# Patient Record
Sex: MALE | Race: WHITE | NOT HISPANIC OR LATINO | Employment: FULL TIME | URBAN - METROPOLITAN AREA
[De-identification: names, ages, dates, MRNs, and addresses within clinical notes are randomized per-mention and may not be internally consistent; named-entity substitution may affect disease eponyms.]

---

## 2017-02-22 ENCOUNTER — ALLSCRIPTS OFFICE VISIT (OUTPATIENT)
Dept: OTHER | Facility: OTHER | Age: 27
End: 2017-02-22

## 2017-04-18 ENCOUNTER — ALLSCRIPTS OFFICE VISIT (OUTPATIENT)
Dept: OTHER | Facility: OTHER | Age: 27
End: 2017-04-18

## 2017-04-21 ENCOUNTER — ALLSCRIPTS OFFICE VISIT (OUTPATIENT)
Dept: OTHER | Facility: OTHER | Age: 27
End: 2017-04-21

## 2017-04-21 ENCOUNTER — GENERIC CONVERSION - ENCOUNTER (OUTPATIENT)
Dept: OTHER | Facility: OTHER | Age: 27
End: 2017-04-21

## 2017-04-22 LAB
A/G RATIO (HISTORICAL): 1.8 (ref 1.2–2.2)
ALBUMIN SERPL BCP-MCNC: 4.7 G/DL (ref 3.5–5.5)
ALP SERPL-CCNC: 60 IU/L (ref 39–117)
ALT SERPL W P-5'-P-CCNC: 36 IU/L (ref 0–44)
AST SERPL W P-5'-P-CCNC: 28 IU/L (ref 0–40)
BASOPHILS # BLD AUTO: 0.1 X10E3/UL (ref 0–0.2)
BASOPHILS # BLD AUTO: 1 %
BILIRUB SERPL-MCNC: 0.5 MG/DL (ref 0–1.2)
BUN SERPL-MCNC: 14 MG/DL (ref 6–20)
BUN/CREA RATIO (HISTORICAL): 16 (ref 9–20)
CALCIUM SERPL-MCNC: 9.6 MG/DL (ref 8.7–10.2)
CHLORIDE SERPL-SCNC: 97 MMOL/L (ref 96–106)
CO2 SERPL-SCNC: 24 MMOL/L (ref 18–29)
CREAT SERPL-MCNC: 0.88 MG/DL (ref 0.76–1.27)
DEPRECATED RDW RBC AUTO: 14 % (ref 12.3–15.4)
EGFR AFRICAN AMERICAN (HISTORICAL): 138 ML/MIN/1.73
EGFR-AMERICAN CALC (HISTORICAL): 119 ML/MIN/1.73
EOSINOPHIL # BLD AUTO: 0.2 X10E3/UL (ref 0–0.4)
EOSINOPHIL # BLD AUTO: 3 %
ERYTHROCYTE SEDIMENTATION RATE (HISTORICAL): 5 MM/HR (ref 0–15)
GLUCOSE SERPL-MCNC: 78 MG/DL (ref 65–99)
HCT VFR BLD AUTO: 43 % (ref 37.5–51)
HGB BLD-MCNC: 14.4 G/DL (ref 12.6–17.7)
IMM.GRANULOCYTES (CD4/8) (HISTORICAL): 0 %
IMM.GRANULOCYTES (CD4/8) (HISTORICAL): 0 X10E3/UL (ref 0–0.1)
LYME 18 KD IGG (HISTORICAL): ABNORMAL
LYME 23 KD IGG (HISTORICAL): ABNORMAL
LYME 23 KD IGM (HISTORICAL): ABNORMAL
LYME 28 KD IGG (HISTORICAL): ABNORMAL
LYME 30 KD IGG (HISTORICAL): ABNORMAL
LYME 39 KD IGG (HISTORICAL): ABNORMAL
LYME 39 KD IGM (HISTORICAL): ABNORMAL
LYME 41 KD IGG (HISTORICAL): ABNORMAL
LYME 41 KD IGM (HISTORICAL): ABNORMAL
LYME 45 KD IGG (HISTORICAL): ABNORMAL
LYME 58 KD IGG (HISTORICAL): ABNORMAL
LYME 66 KD IGG (HISTORICAL): PRESENT
LYME 93 KD IGG (HISTORICAL): ABNORMAL
LYME IGG WB INTERP. (HISTORICAL): NEGATIVE
LYME IGM WB INTERP. (HISTORICAL): NEGATIVE
LYMPHOCYTES # BLD AUTO: 2.3 X10E3/UL (ref 0.7–3.1)
LYMPHOCYTES # BLD AUTO: 29 %
MCH RBC QN AUTO: 27 PG (ref 26.6–33)
MCHC RBC AUTO-ENTMCNC: 33.5 G/DL (ref 31.5–35.7)
MCV RBC AUTO: 81 FL (ref 79–97)
MONOCYTES # BLD AUTO: 0.9 X10E3/UL (ref 0.1–0.9)
MONOCYTES (HISTORICAL): 11 %
NEUTROPHILS # BLD AUTO: 4.5 X10E3/UL (ref 1.4–7)
NEUTROPHILS # BLD AUTO: 56 %
PLATELET # BLD AUTO: 235 X10E3/UL (ref 150–379)
POTASSIUM SERPL-SCNC: 3.8 MMOL/L (ref 3.5–5.2)
RBC (HISTORICAL): 5.34 X10E6/UL (ref 4.14–5.8)
SODIUM SERPL-SCNC: 141 MMOL/L (ref 134–144)
TOT. GLOBULIN, SERUM (HISTORICAL): 2.6 G/DL (ref 1.5–4.5)
TOTAL PROTEIN (HISTORICAL): 7.3 G/DL (ref 6–8.5)
TSH SERPL DL<=0.05 MIU/L-ACNC: 6.15 UIU/ML (ref 0.45–4.5)
WBC # BLD AUTO: 8 X10E3/UL (ref 3.4–10.8)

## 2017-04-23 ENCOUNTER — GENERIC CONVERSION - ENCOUNTER (OUTPATIENT)
Dept: OTHER | Facility: OTHER | Age: 27
End: 2017-04-23

## 2017-04-25 ENCOUNTER — ALLSCRIPTS OFFICE VISIT (OUTPATIENT)
Dept: OTHER | Facility: OTHER | Age: 27
End: 2017-04-25

## 2017-05-01 ENCOUNTER — ALLSCRIPTS OFFICE VISIT (OUTPATIENT)
Dept: OTHER | Facility: OTHER | Age: 27
End: 2017-05-01

## 2017-05-15 ENCOUNTER — ALLSCRIPTS OFFICE VISIT (OUTPATIENT)
Dept: OTHER | Facility: OTHER | Age: 27
End: 2017-05-15

## 2017-05-30 ENCOUNTER — ALLSCRIPTS OFFICE VISIT (OUTPATIENT)
Dept: OTHER | Facility: OTHER | Age: 27
End: 2017-05-30

## 2017-07-06 ENCOUNTER — GENERIC CONVERSION - ENCOUNTER (OUTPATIENT)
Dept: OTHER | Facility: OTHER | Age: 27
End: 2017-07-06

## 2017-07-13 ENCOUNTER — ALLSCRIPTS OFFICE VISIT (OUTPATIENT)
Dept: OTHER | Facility: OTHER | Age: 27
End: 2017-07-13

## 2017-09-01 ENCOUNTER — ALLSCRIPTS OFFICE VISIT (OUTPATIENT)
Dept: OTHER | Facility: OTHER | Age: 27
End: 2017-09-01

## 2017-10-11 ENCOUNTER — ALLSCRIPTS OFFICE VISIT (OUTPATIENT)
Dept: OTHER | Facility: OTHER | Age: 27
End: 2017-10-11

## 2017-10-13 NOTE — PROGRESS NOTES
Assessment  1  Anxiety (300 00) (F41 9)   2  Panic disorder without agoraphobia (300 01) (F41 0)    Plan  Bilateral impacted cerumen    · Removal Impacted Cerumen Using Irrigation / Lavage one or both ears - POC;  Status:Complete - Retrospective By Protocol Authorization;   Done: 24IYB7104  Refused influenza vaccine    · Fluzone Quadrivalent Intramuscular Suspension    Discussion/Summary    DISCUSSED ISSUESOPTIONS ONCE AGAINGIVENDECREASING [id number]  The patient was counseled regarding instructions for management,-risks and benefits of treatment options  total time of encounter was 15 minutes-and-15 minutes was spent counseling  The treatment plan was reviewed with the patient/guardian  The patient/guardian understands and agrees with the treatment plan      Chief Complaint  Patient is here today for a medication check, L  Lowery/LPN      History of Present Illness  PATENT RETURNSHAD SEVERAL PANIC ATTACKS - INCREASE RECENTLYOPTIONS AVAILABLE      Active Problems  1  Anxiety (300 00) (F41 9)   2  Mild intermittent asthma without complication (627 93) (A23 64)   3  Panic disorder without agoraphobia (300 01) (F41 0)    Past Medical History  1  History of Carpal tunnel syndrome, right (354 0) (G56 01)   2  History of Exercise-induced asthma (493 81) (J45 990)   3  History of Exertional dyspnea (786 09) (R06 09)    The active problems and past medical history were reviewed and updated today  Surgical History  1  History of Hip Surgery   2  Denied: History Of Prior Surgery    The surgical history was reviewed and updated today  Family History  Mother    1  Family history of Lymphedema  Family History    2  Denied: Family history of mental disorder    The family history was reviewed and updated today         Social History   · Caffeine use (V49 89) (F15 90)   · Dental care, occasionally   · Former smoker (V15 82) (Y59 671)   · No advance directives (V49 89) (Z78 9)   · No alcohol use   · Use of energy drinks (V49 89) (Z78 9)  The social history was reviewed and updated today  Current Meds   1  ALPRAZolam 0 25 MG Oral Tablet; TAKE 1 TABLET BY MOUTH THREE TIMES DAILY AS   NEEDED; Therapy: 40Ota6110 to (Evaluate:23Oct2017); Last Rx:09Oct2017 Ordered   2  Fluticasone Propionate 50 MCG/ACT Nasal Suspension; USE 1 SPRAY IN EACH   NOSTRIL ONCE DAILY; Therapy: 60FCI7782 to (Evaluate:28Apr2017)  Requested for: 88MQQ7983; Last   Rx:32Fqu5615 Ordered   3  Venlafaxine HCl ER 37 5 MG Oral Capsule Extended Release 24 Hour; TAKE 1   CAPSULE ONCE DAILY WITH FOOD; Therapy: 10OTB0906 to (XHNTIWDL:13KMH2903)  Requested for: 33Npr1204; Last   Rx:59Qcp8301 Ordered   4  Ventolin  (90 Base) MCG/ACT Inhalation Aerosol Solution; INHALE 2 PUFFS   FOUR TIMES DAILY AS DIRECTED; Therapy: 99OXF3946 to (Last Rx:30Nov2016)  Requested for: 12PVB2257 Ordered   5  Zubsolv 5 7-1 4 MG Sublingual Tablet Sublingual; DISSOLVE 1 TABLET Daily; Therapy: (Recorded:29Gta7456) to Recorded    The medication list was reviewed and updated today  Allergies  1  No Known Drug Allergies  2  Mold    Vitals  Vital Signs    Recorded: 39GWT3690 03:43PM   Temperature 98 2 F, Temporal   Heart Rate 82, R PT   Pulse Quality Normal, R PT   Respiration Quality Normal   Respiration 16   Systolic 691, LUE, Sitting   Diastolic 80, LUE, Sitting   Height 5 ft 10 5 in   Weight 266 lb    BMI Calculated 37 63   BSA Calculated 2 37   O2 Saturation 98     Results/Data  PHQ-9 Adult Depression Screening 55KTK8632 04:29PM User, s     Test Name Result Flag Reference   PHQ-9 Adult Depression Score 3     Over the last two weeks, how often have you been bothered by any of the following problems?   Little interest or pleasure in doing things: Several days - 1  Feeling down, depressed, or hopeless: Several days - 1  Trouble falling or staying asleep, or sleeping too much: Not at all - 0  Feeling tired or having little energy: Several days - 1  Poor appetite or over eating: Not at all - 0  Feeling bad about yourself - or that you are a failure or have let yourself or your family down: Not at all - 0  Trouble concentrating on things, such as reading the newspaper or watching television: Not at all - 0  Moving or speaking so slowly that other people could have noticed   Or the opposite -  being so fidgety or restless that you have been moving around a lot more than usual: Not at all - 0  Thoughts that you would be better off dead, or of hurting yourself in some way: Not at all - 0   PHQ-9 Adult Depression Screening Negative     PHQ-9 Difficulty Level Somewhat difficult     PHQ-9 Severity Minimal Depression         Future Appointments    Date/Time Provider Specialty Site   11/13/2017 03:45 PM Adalid Mitchell MD Family Medicine [facility]     Signatures   Electronically signed by : Glenn Nobles MD; Oct 11 2017  8:15PM EST                       (Author)

## 2017-11-27 ENCOUNTER — GENERIC CONVERSION - ENCOUNTER (OUTPATIENT)
Dept: OTHER | Facility: OTHER | Age: 27
End: 2017-11-27

## 2018-01-10 NOTE — RESULT NOTES
Verified Results  (1) CBC/PLT/DIFF 21Apr2017 12:00AM Pict     Test Name Result Flag Reference   WBC 8 0 x10E3/uL  3 4-10 8   RBC 5 34 x10E6/uL  4 14-5 80   Hemoglobin 14 4 g/dL  12 6-17 7   Hematocrit 43 0 %  37 5-51 0   MCV 81 fL  79-97   MCH 27 0 pg  26 6-33 0   MCHC 33 5 g/dL  31 5-35 7   RDW 14 0 %  12 3-15 4   Platelets 511 B75O9/MC  150-379   Neutrophils 56 %     Lymphs 29 %     Monocytes 11 %     Eos 3 %     Basos 1 %     Neutrophils (Absolute) 4 5 x10E3/uL  1 4-7 0   Lymphs (Absolute) 2 3 x10E3/uL  0 7-3 1   Monocytes(Absolute) 0 9 x10E3/uL  0 1-0 9   Eos (Absolute) 0 2 x10E3/uL  0 0-0 4   Baso (Absolute) 0 1 x10E3/uL  0 0-0 2   Immature Granulocytes 0 %     Immature Grans (Abs) 0 0 x10E3/uL  0 0-0 1     (1) COMPREHENSIVE METABOLIC PANEL 76PZI2419 81:82NB Pict     Test Name Result Flag Reference   Glucose, Serum 78 mg/dL  65-99   BUN 14 mg/dL  6-20   Creatinine, Serum 0 88 mg/dL  0 76-1 27   BUN/Creatinine Ratio 16  9-20   Sodium, Serum 141 mmol/L  134-144   Potassium, Serum 3 8 mmol/L  3 5-5 2   Chloride, Serum 97 mmol/L     Carbon Dioxide, Total 24 mmol/L  18-29   Calcium, Serum 9 6 mg/dL  8 7-10 2   Protein, Total, Serum 7 3 g/dL  6 0-8 5   Albumin, Serum 4 7 g/dL  3 5-5 5   Globulin, Total 2 6 g/dL  1 5-4 5   A/G Ratio 1 8  1 2-2 2   Bilirubin, Total 0 5 mg/dL  0 0-1 2   Alkaline Phosphatase, S 60 IU/L     AST (SGOT) 28 IU/L  0-40   ALT (SGPT) 36 IU/L  0-44   eGFR If NonAfricn Am 119 mL/min/1 73  >59   eGFR If Africn Am 138 mL/min/1 73  >59     (1) TSH 21Apr2017 12:00AM Tevin Grate     Test Name Result Flag Reference   TSH 6 150 uIU/mL H 0 450-4 500     (LC) Sedimentation Rate-Westergren 21Apr2017 12:00AM Tevin Grate     Test Name Result Flag Reference   Sedimentation Rate-Westergren 5 mm/hr  0-15     (LC) Lyme, Western Blot, Serum 21Apr2017 12:00AM Tevin [facility]     Test Name Result Flag Reference   Lyme IgG WB Interp   Negative     Positive: 5 of the following Borrelia-specific bands:                                                18,23,28,30,39,41,45,58,                                                66, and 93  Negative: No bands or banding                                                patterns which do not                                                meet positive criteria  Lyme IgM WB Interp  Negative     Note: An equivocal or positive EIA result followed by a negative  Western Blot result is considered NEGATIVE  An equivocal or positive  EIA result followed by a positive Western Blot is considered POSITIVE  by the CDC  Positive: 2 of the following bands: 23,39 or 41  Negative: No bands or banding patterns which do not meet positive  criteria  Criteria for positivity are those recommended by CDC/ASTPHLD   p23=Osp C, v10=wtkcynile  Note:  Sera from individuals with the following may cross react in the  Lyme Western Blot assays: other spirochetal diseases (periodontal  disease, leptospirosis, relapsing fever, yaws, and pinta);  connective autoimmune (Rheumatoid Arthritis and Systemic Lupus  Erythematosus and also individuals with Antinuclear Antibody);  other infections COFFEE [facility] Spotted Fever; Kailash-Barr Virus,  and Cytomegalovirus)  IgG P18 Ab  Absent     IgG P23 Ab  Absent     IgG P28 Ab  Absent     IgG P30 Ab  Absent     IgG P39 Ab  Absent     IgG P41 Ab  Absent     IgG P45 Ab  Absent     IgG P58 Ab  Absent     IgG P66 Ab  Present A    IgG P93 Ab  Absent     IgM P23 Ab  Absent     IgM P39 Ab  Absent     IgM P41 Ab   Absent

## 2018-01-13 VITALS
DIASTOLIC BLOOD PRESSURE: 78 MMHG | HEIGHT: 70 IN | BODY MASS INDEX: 39.08 KG/M2 | RESPIRATION RATE: 16 BRPM | TEMPERATURE: 98.6 F | HEART RATE: 76 BPM | WEIGHT: 273 LBS | SYSTOLIC BLOOD PRESSURE: 122 MMHG

## 2018-01-13 VITALS
BODY MASS INDEX: 38.51 KG/M2 | HEART RATE: 88 BPM | OXYGEN SATURATION: 97 % | DIASTOLIC BLOOD PRESSURE: 70 MMHG | TEMPERATURE: 98.2 F | WEIGHT: 269 LBS | RESPIRATION RATE: 16 BRPM | HEIGHT: 70 IN | SYSTOLIC BLOOD PRESSURE: 130 MMHG

## 2018-01-13 VITALS
HEART RATE: 82 BPM | DIASTOLIC BLOOD PRESSURE: 80 MMHG | SYSTOLIC BLOOD PRESSURE: 140 MMHG | WEIGHT: 272 LBS | HEIGHT: 70 IN | TEMPERATURE: 98.4 F | BODY MASS INDEX: 38.94 KG/M2 | RESPIRATION RATE: 16 BRPM | OXYGEN SATURATION: 96 %

## 2018-01-13 VITALS
RESPIRATION RATE: 16 BRPM | DIASTOLIC BLOOD PRESSURE: 80 MMHG | HEART RATE: 74 BPM | SYSTOLIC BLOOD PRESSURE: 140 MMHG | OXYGEN SATURATION: 97 % | TEMPERATURE: 98.1 F

## 2018-01-13 VITALS
DIASTOLIC BLOOD PRESSURE: 90 MMHG | WEIGHT: 270 LBS | HEART RATE: 90 BPM | OXYGEN SATURATION: 98 % | BODY MASS INDEX: 38.65 KG/M2 | SYSTOLIC BLOOD PRESSURE: 138 MMHG | HEIGHT: 70 IN | TEMPERATURE: 98.3 F | RESPIRATION RATE: 16 BRPM

## 2018-01-13 VITALS
OXYGEN SATURATION: 97 % | HEIGHT: 70 IN | DIASTOLIC BLOOD PRESSURE: 82 MMHG | RESPIRATION RATE: 16 BRPM | TEMPERATURE: 98.2 F | HEART RATE: 84 BPM | SYSTOLIC BLOOD PRESSURE: 140 MMHG | BODY MASS INDEX: 38.94 KG/M2 | WEIGHT: 272 LBS

## 2018-01-13 VITALS
HEART RATE: 82 BPM | WEIGHT: 263 LBS | DIASTOLIC BLOOD PRESSURE: 86 MMHG | TEMPERATURE: 97.7 F | HEIGHT: 70 IN | RESPIRATION RATE: 16 BRPM | SYSTOLIC BLOOD PRESSURE: 120 MMHG | BODY MASS INDEX: 37.65 KG/M2

## 2018-01-13 NOTE — PROGRESS NOTES
Assessment    1  Acute bronchitis (466 0) (J20 9)    Plan  Acute bronchitis    · Cefuroxime Axetil 250 MG Oral Tablet; Take one twice daily for 10 days   · Follow Up if Not Better Evaluation and Treatment  Follow-up  Status: Complete  Done:  31HUJ4960   · Call [phone] if: Breathing starts to have a wheeze or whistling sound ;  Status:Complete;   Done: 22YTS4962   · Call [phone] if: The cough is not gone in 10 days ; Status:Complete;   Done:  80AZW3695   · Call [phone] if: The fever has not gone away in 2 days ; Status:Complete;   Done:  28OTL3870   · Seek Immediate Medical Attention if: You are feeling short of breath ; Status:Complete;    Done: 64IUM9586   · Seek Immediate Medical Attention if: You have difficulty breathing, or you are short of  breath more often ; Status:Complete;   Done: 41RNQ2241   · Seek Immediate Medical Attention if: You notice that breathing is rapid, more than 40  times a minute ; Status:Complete;   Done: 74RMB9284    Chief Complaint  Patient is here today for sore throat and cough x 2 days, L/Lowery/lpn      History of Present Illness  Bronchitis, Acute, Adult (Brief): The patient is being seen for an initial evaluation of acute bronchitis  Symptoms:  productive cough  The patient is currently asymptomatic  No associated symptoms are reported  The patient is not currently being treated for this problem  Pertinent medical history:  no asthma, no chronic bronchitis, no chronic obstructive pulmonary disease, no congestive heart failure, no myocardial infarction, no stroke, no gastroesophageal reflux disease, no diabetes mellitus, no obesity, no environmental allergies, no neoplasm, no impaired immunity and no current influenza vaccination  Risk factors:  smoking  Review of Systems    Constitutional: no fever or chills, feels well, no tiredness, no recent weight loss or weight gain     ENT: nasal discharge, but no complaints of earache, no loss of hearing, no nosebleeds or nasal discharge, no sore throat or hoarseness  Cardiovascular: no complaints of slow or fast heart rate, no chest pain, no palpitations, no leg claudication or lower extremity edema  Respiratory: as noted in HPI  Active Problems    1  Acute bronchitis (466 0) (J20 9)   2  Acute maxillary sinusitis (461 0) (J01 00)   3  Adenitis (289 3) (I88 9)   4  Asthma (493 90) (J45 909)   5  Headache (784 0) (R51)   6  Rash (782 1) (R21)    Past Medical History    1  History of Exercise-induced asthma (493 81) (J45 990)  Active Problems And Past Medical History Reviewed: The active problems and past medical history were reviewed and updated today  Family History    1  Family history of Lymphedema  Family History Reviewed: The family history was reviewed and updated today  Social History    · Caffeine use (V49 89) (F15 90)   · Former smoker (Q43 76) (R66 023)   · No alcohol use  The social history was reviewed and updated today  Surgical History    1  Denied: History Of Prior Surgery  Surgical History Reviewed: The surgical history was reviewed and updated today  Current Meds   1  Fluticasone Propionate 50 MCG/ACT Nasal Suspension; USE 1 SPRAY IN EACH   NOSTRIL ONCE DAILY; Therapy: 82RRL7060 to (Evaluate:15Con5172)  Requested for: 29Vuy1678; Last   Rx:20Rwg2063 Ordered   2  Fluticasone Propionate 50 MCG/ACT Nasal Suspension; USE 1 SPRAY IN EACH   NOSTRIL ONCE DAILY; Therapy: 07YCU2379 to (Aliya Inman)  Requested for: 07IYA2735; Last   JY:06WOX9163 Ordered   3  Ventolin  (90 Base) MCG/ACT Inhalation Aerosol Solution; INHALE 2 PUFFS   FOUR TIMES DAILY AS DIRECTED; Therapy: 88TUK3181 to (Last Rx:12Tzz4684)  Requested for: 38Jpe3128 Ordered    The medication list was reviewed and updated today  Allergies    1  No Known Drug Allergies    2   Mold    Vitals   Recorded: 65THH0387 05:10PM   Temperature 99 5 F, Tympanic   Heart Rate 100, L Radial   Pulse Quality Normal, L Radial   Respiration 24   Respiration Quality Normal   Systolic 438, LUE, Sitting   Diastolic 70, LUE, Sitting   BP Cuff Size Large   Height 5 ft 10 5 in   Weight 275 lb    BMI Calculated 38 9   BSA Calculated 2 4   O2 Saturation 95     Physical Exam    Constitutional   General appearance: No acute distress, well appearing and well nourished  Eyes   Conjunctiva and lids: No swelling, erythema, or discharge  Pupils and irises: Equal, round and reactive to light  Ears, Nose, Mouth, and Throat   External inspection of ears and nose: Normal     Otoscopic examination: Tympanic membrance translucent with normal light reflex  Canals patent without erythema  Nasal mucosa, septum, and turbinates: Normal without edema or erythema  Oropharynx: Normal with no erythema, edema, exudate or lesions  Pulmonary   Respiratory effort: No increased work of breathing or signs of respiratory distress  Auscultation of lungs: Abnormal   Few rhonchi  Cardiovascular   Auscultation of heart: Normal rate and rhythm, normal S1 and S2, without murmurs           Signatures   Electronically signed by : Kory Novak MD; Jan 14 2016  5:25PM EST                       (Author)

## 2018-01-14 VITALS
DIASTOLIC BLOOD PRESSURE: 80 MMHG | HEIGHT: 71 IN | WEIGHT: 266 LBS | HEART RATE: 82 BPM | BODY MASS INDEX: 37.24 KG/M2 | SYSTOLIC BLOOD PRESSURE: 130 MMHG | TEMPERATURE: 98.2 F | OXYGEN SATURATION: 98 % | RESPIRATION RATE: 16 BRPM

## 2018-01-15 ENCOUNTER — GENERIC CONVERSION - ENCOUNTER (OUTPATIENT)
Dept: OTHER | Facility: OTHER | Age: 28
End: 2018-01-15

## 2018-01-15 VITALS
BODY MASS INDEX: 38.51 KG/M2 | WEIGHT: 269 LBS | HEIGHT: 70 IN | RESPIRATION RATE: 16 BRPM | SYSTOLIC BLOOD PRESSURE: 138 MMHG | TEMPERATURE: 98.2 F | DIASTOLIC BLOOD PRESSURE: 82 MMHG | HEART RATE: 90 BPM

## 2018-01-15 VITALS
BODY MASS INDEX: 39.22 KG/M2 | SYSTOLIC BLOOD PRESSURE: 116 MMHG | TEMPERATURE: 97.7 F | OXYGEN SATURATION: 99 % | HEIGHT: 70 IN | WEIGHT: 274 LBS | HEART RATE: 84 BPM | DIASTOLIC BLOOD PRESSURE: 70 MMHG | RESPIRATION RATE: 16 BRPM

## 2018-01-15 NOTE — MISCELLANEOUS
Provider Comments  Provider Comments:   LMOM FOR PT TO RESCHEDULE APPT   SF      Signatures   Electronically signed by : Tyler Lundberg MD; Jul 6 2017  3:51PM EST                       (Author)

## 2018-01-22 VITALS
BODY MASS INDEX: 38.36 KG/M2 | OXYGEN SATURATION: 98 % | SYSTOLIC BLOOD PRESSURE: 128 MMHG | RESPIRATION RATE: 16 BRPM | TEMPERATURE: 96 F | HEART RATE: 84 BPM | WEIGHT: 274 LBS | DIASTOLIC BLOOD PRESSURE: 80 MMHG | HEIGHT: 71 IN

## 2018-01-24 VITALS
BODY MASS INDEX: 38.22 KG/M2 | HEIGHT: 71 IN | TEMPERATURE: 96.1 F | WEIGHT: 273 LBS | DIASTOLIC BLOOD PRESSURE: 82 MMHG | SYSTOLIC BLOOD PRESSURE: 120 MMHG | RESPIRATION RATE: 16 BRPM | HEART RATE: 96 BPM | OXYGEN SATURATION: 98 %

## 2018-02-26 ENCOUNTER — OFFICE VISIT (OUTPATIENT)
Dept: FAMILY MEDICINE CLINIC | Facility: CLINIC | Age: 28
End: 2018-02-26
Payer: COMMERCIAL

## 2018-02-26 VITALS
HEIGHT: 70 IN | SYSTOLIC BLOOD PRESSURE: 124 MMHG | RESPIRATION RATE: 16 BRPM | WEIGHT: 270 LBS | BODY MASS INDEX: 38.65 KG/M2 | HEART RATE: 82 BPM | DIASTOLIC BLOOD PRESSURE: 82 MMHG | TEMPERATURE: 97.6 F

## 2018-02-26 DIAGNOSIS — F41.9 ANXIETY: Primary | ICD-10-CM

## 2018-02-26 DIAGNOSIS — F41.0 PANIC DISORDER WITHOUT AGORAPHOBIA: ICD-10-CM

## 2018-02-26 PROCEDURE — 99213 OFFICE O/P EST LOW 20 MIN: CPT | Performed by: FAMILY MEDICINE

## 2018-02-26 RX ORDER — ALPRAZOLAM 0.25 MG/1
0.25 TABLET ORAL 3 TIMES DAILY PRN
Qty: 60 TABLET | Refills: 0 | Status: SHIPPED | OUTPATIENT
Start: 2018-02-26 | End: 2018-04-30 | Stop reason: SDUPTHER

## 2018-02-26 RX ORDER — VENLAFAXINE HYDROCHLORIDE 37.5 MG/1
1 CAPSULE, EXTENDED RELEASE ORAL DAILY
COMMUNITY
Start: 2017-07-16 | End: 2019-04-12 | Stop reason: ALTCHOICE

## 2018-02-26 RX ORDER — BUPRENORPHINE HYDROCHLORIDE AND NALOXONE HYDROCHLORIDE 5.7; 1.4 MG/1; MG/1
TABLET, ORALLY DISINTEGRATING SUBLINGUAL DAILY
Refills: 0 | COMMUNITY
Start: 2018-01-17

## 2018-02-26 RX ORDER — ALPRAZOLAM 0.25 MG/1
1 TABLET ORAL 3 TIMES DAILY PRN
COMMUNITY
Start: 2017-04-18 | End: 2018-02-26 | Stop reason: SDUPTHER

## 2018-02-26 RX ORDER — FLUTICASONE PROPIONATE 50 MCG
1 SPRAY, SUSPENSION (ML) NASAL DAILY
COMMUNITY
Start: 2015-05-07

## 2018-04-02 ENCOUNTER — OFFICE VISIT (OUTPATIENT)
Dept: FAMILY MEDICINE CLINIC | Facility: CLINIC | Age: 28
End: 2018-04-02
Payer: COMMERCIAL

## 2018-04-02 VITALS
DIASTOLIC BLOOD PRESSURE: 80 MMHG | TEMPERATURE: 96.6 F | WEIGHT: 271.2 LBS | HEIGHT: 70 IN | HEART RATE: 113 BPM | BODY MASS INDEX: 38.82 KG/M2 | RESPIRATION RATE: 18 BRPM | OXYGEN SATURATION: 98 % | SYSTOLIC BLOOD PRESSURE: 122 MMHG

## 2018-04-02 DIAGNOSIS — J06.9 URTI (ACUTE UPPER RESPIRATORY INFECTION): ICD-10-CM

## 2018-04-02 DIAGNOSIS — J01.40 ACUTE NON-RECURRENT PANSINUSITIS: Primary | ICD-10-CM

## 2018-04-02 PROCEDURE — 3008F BODY MASS INDEX DOCD: CPT | Performed by: FAMILY MEDICINE

## 2018-04-02 PROCEDURE — 99213 OFFICE O/P EST LOW 20 MIN: CPT | Performed by: FAMILY MEDICINE

## 2018-04-02 RX ORDER — AMOXICILLIN AND CLAVULANATE POTASSIUM 500; 125 MG/1; MG/1
1 TABLET, FILM COATED ORAL EVERY 12 HOURS SCHEDULED
Qty: 20 TABLET | Refills: 0 | Status: SHIPPED | OUTPATIENT
Start: 2018-04-02 | End: 2018-04-12

## 2018-04-02 NOTE — PROGRESS NOTES
Assessment/Plan:    Problem List Items Addressed This Visit     Acute non-recurrent pansinusitis - Primary    Relevant Medications    amoxicillin-clavulanate (AUGMENTIN) 500-125 mg per tablet    URTI (acute upper respiratory infection)    Relevant Medications    amoxicillin-clavulanate (AUGMENTIN) 500-125 mg per tablet          Patient Instructions   PLENTY FLUIDS  REST  MUCINEX  MEDICATION AS DIRECTED  IF SYMPTOMS PERSIST OR WORSEN, PLEASE CALL      Return in about 2 weeks (around 4/16/2018), or if symptoms worsen or fail to improve  Subjective:      Patient ID: Rosa Byrd is a 33 y o  male  Chief Complaint   Patient presents with    Sore Throat       Sinusitis   This is a new problem  The current episode started in the past 7 days  The problem has been gradually worsening since onset  The pain is mild  Associated symptoms include congestion, coughing, a hoarse voice, sinus pressure and a sore throat  Pertinent negatives include no chills, diaphoresis, ear pain, headaches, neck pain, shortness of breath, sneezing or swollen glands  Past treatments include acetaminophen  The treatment provided mild relief  The following portions of the patient's history were reviewed and updated as appropriate: allergies, current medications, past family history, past medical history, past social history, past surgical history and problem list     Review of Systems   Constitutional: Negative for chills and diaphoresis  HENT: Positive for congestion, hoarse voice, sinus pressure and sore throat  Negative for ear pain and sneezing  Eyes: Negative for discharge  Respiratory: Positive for cough  Negative for shortness of breath  Cardiovascular: Negative for chest pain  Gastrointestinal: Negative for abdominal pain, diarrhea, nausea and vomiting  Genitourinary: Negative for dysuria  Musculoskeletal: Negative for arthralgias, myalgias and neck pain  Neurological: Negative for headaches  Hematological: Negative for adenopathy  Psychiatric/Behavioral: The patient is not nervous/anxious  Current Outpatient Prescriptions   Medication Sig Dispense Refill    ALPRAZolam (XANAX) 0 25 mg tablet Take 1 tablet (0 25 mg total) by mouth 3 (three) times a day as needed for anxiety 60 tablet 0    fluticasone (FLONASE) 50 mcg/act nasal spray 1 spray into each nostril daily      venlafaxine (EFFEXOR-XR) 37 5 mg 24 hr capsule Take 1 capsule by mouth Daily      VENTOLIN  (90 Base) MCG/ACT inhaler Inhale 2 puffs 4 (four) times a day As directed  0    ZUBSOLV 5 7-1 4 MG SUBL   0    amoxicillin-clavulanate (AUGMENTIN) 500-125 mg per tablet Take 1 tablet by mouth every 12 (twelve) hours for 10 days 20 tablet 0     No current facility-administered medications for this visit  Objective:    /80 (BP Location: Left arm, Patient Position: Sitting, Cuff Size: Large)   Pulse (!) 113   Temp (!) 96 6 °F (35 9 °C) (Temporal)   Resp 18   Ht 5' 10" (1 778 m)   Wt 123 kg (271 lb 3 2 oz)   SpO2 98%   BMI 38 91 kg/m²        Physical Exam   Constitutional: He is oriented to person, place, and time  He appears well-developed and well-nourished  HENT:   Head: Normocephalic and atraumatic  Right Ear: No drainage  Tympanic membrane is not erythematous and not bulging  A middle ear effusion is present  Left Ear: No drainage  Tympanic membrane is not erythematous and not bulging  A middle ear effusion is present  Nose: Mucosal edema and rhinorrhea present  Mouth/Throat: Uvula is midline  Posterior oropharyngeal erythema present  No oropharyngeal exudate  Eyes: Pupils are equal, round, and reactive to light  Right eye exhibits no discharge  Left eye exhibits no discharge  Right conjunctiva is injected  Left conjunctiva is injected  Neck: Normal range of motion  Neck supple  Cardiovascular: Normal rate, regular rhythm and normal heart sounds  No murmur heard    Pulmonary/Chest: Effort normal and breath sounds normal  He has no wheezes  He has no rales  Abdominal: Soft  Bowel sounds are normal  There is no tenderness  Musculoskeletal: Normal range of motion  He exhibits no edema  Lymphadenopathy:     He has no cervical adenopathy  Neurological: He is alert and oriented to person, place, and time  Skin: Skin is warm and dry  No rash noted  Psychiatric: He has a normal mood and affect  His behavior is normal  Judgment and thought content normal    Nursing note and vitals reviewed               Cliff Ybarra MD

## 2018-04-30 DIAGNOSIS — F41.9 ANXIETY: ICD-10-CM

## 2018-04-30 RX ORDER — ALPRAZOLAM 0.25 MG/1
0.25 TABLET ORAL 3 TIMES DAILY PRN
Qty: 60 TABLET | Refills: 0 | Status: SHIPPED | OUTPATIENT
Start: 2018-04-30 | End: 2018-06-13 | Stop reason: SDUPTHER

## 2018-06-13 DIAGNOSIS — F41.9 ANXIETY: ICD-10-CM

## 2018-06-13 DIAGNOSIS — J45.20 MILD INTERMITTENT ASTHMA WITHOUT COMPLICATION: Primary | ICD-10-CM

## 2018-06-13 RX ORDER — ALPRAZOLAM 0.25 MG/1
0.25 TABLET ORAL 3 TIMES DAILY PRN
Qty: 60 TABLET | Refills: 0 | Status: SHIPPED | OUTPATIENT
Start: 2018-06-13 | End: 2018-07-24 | Stop reason: SDUPTHER

## 2018-07-24 DIAGNOSIS — F41.9 ANXIETY: ICD-10-CM

## 2018-07-24 RX ORDER — ALPRAZOLAM 0.25 MG/1
0.25 TABLET ORAL 3 TIMES DAILY PRN
Qty: 60 TABLET | Refills: 0 | Status: SHIPPED | OUTPATIENT
Start: 2018-07-24 | End: 2018-09-04 | Stop reason: SDUPTHER

## 2018-09-04 DIAGNOSIS — F41.9 ANXIETY: ICD-10-CM

## 2018-09-04 RX ORDER — ALPRAZOLAM 0.25 MG/1
0.25 TABLET ORAL 3 TIMES DAILY PRN
Qty: 60 TABLET | Refills: 0 | Status: SHIPPED | OUTPATIENT
Start: 2018-09-04 | End: 2018-10-03 | Stop reason: SDUPTHER

## 2018-10-03 DIAGNOSIS — F41.9 ANXIETY: ICD-10-CM

## 2018-10-03 DIAGNOSIS — J45.20 MILD INTERMITTENT ASTHMA WITHOUT COMPLICATION: ICD-10-CM

## 2018-10-03 RX ORDER — ALPRAZOLAM 0.25 MG/1
0.25 TABLET ORAL 3 TIMES DAILY PRN
Qty: 60 TABLET | Refills: 0 | Status: SHIPPED | OUTPATIENT
Start: 2018-10-03 | End: 2018-11-13 | Stop reason: SDUPTHER

## 2018-10-09 ENCOUNTER — OFFICE VISIT (OUTPATIENT)
Dept: FAMILY MEDICINE CLINIC | Facility: CLINIC | Age: 28
End: 2018-10-09
Payer: COMMERCIAL

## 2018-10-09 VITALS
HEIGHT: 72 IN | RESPIRATION RATE: 20 BRPM | OXYGEN SATURATION: 97 % | DIASTOLIC BLOOD PRESSURE: 70 MMHG | HEART RATE: 78 BPM | SYSTOLIC BLOOD PRESSURE: 134 MMHG | BODY MASS INDEX: 37.11 KG/M2 | WEIGHT: 274 LBS | TEMPERATURE: 97.3 F

## 2018-10-09 DIAGNOSIS — J00 RHINOPHARYNGITIS: Primary | ICD-10-CM

## 2018-10-09 DIAGNOSIS — Z23 NEED FOR TETANUS BOOSTER: ICD-10-CM

## 2018-10-09 DIAGNOSIS — Z23 NEED FOR INFLUENZA VACCINATION: ICD-10-CM

## 2018-10-09 PROCEDURE — 1036F TOBACCO NON-USER: CPT | Performed by: NURSE PRACTITIONER

## 2018-10-09 PROCEDURE — 99213 OFFICE O/P EST LOW 20 MIN: CPT | Performed by: NURSE PRACTITIONER

## 2018-10-09 NOTE — ASSESSMENT & PLAN NOTE
Likely viral in etiology  Discussed symptomatic care including fluids, rest, mucinex and Flonase for PND, verbalized understanding  Instructed pt to RTO in one week if symptoms fail to improve

## 2018-10-09 NOTE — PATIENT INSTRUCTIONS
Increase fluid intake as tolerated  Rest and humidification   Continue medications as directed   - Flonase OTC 1-2 sprays each nostril daily PRN post nasal drip   - Mucinex OTC to loosen secretions   Call office on Thursday or Friday if your symptoms do not improve

## 2018-10-09 NOTE — PROGRESS NOTES
Assessment/Plan:    Problem List Items Addressed This Visit       Rhinopharyngitis - Primary     Likely viral in etiology  Discussed symptomatic care including fluids, rest, mucinex and Flonase for PND, verbalized understanding  Instructed pt to RTO in one week if symptoms fail to improve             Patient Instructions   Increase fluid intake as tolerated  Rest and humidification   Continue medications as directed   - Flonase OTC 1-2 sprays each nostril daily PRN post nasal drip   - Mucinex OTC to loosen secretions   Call office on Thursday or Friday if your symptoms do not improve       Return in about 1 week (around 10/16/2018), or if symptoms worsen or fail to improve  Subjective:      Patient ID: Rogers Harvey is a 33 y o  male  Chief Complaint   Patient presents with    Sore Throat    post nasal drip       Sultana Sevilla is a 33year old male who presents to the office for evaluation of head congestion, runny nose, sore throat and post nasal drip that began 3 days ago  Denies fevers, chills, chest pain or shortness of breath  Reports cough that is mostly dry, but can be productive in the morning  Pt has not tried any interventions for his symptoms  No other acute complaints at this time  The following portions of the patient's history were reviewed and updated as appropriate: allergies, current medications, past family history, past medical history, past social history, past surgical history and problem list     Review of Systems   Constitutional: Positive for fatigue  Negative for chills, diaphoresis and fever  HENT: Positive for congestion, postnasal drip, rhinorrhea and sore throat  Negative for ear discharge, ear pain, sinus pain and sinus pressure  Eyes: Negative for pain and discharge  Respiratory: Positive for cough  Negative for chest tightness, shortness of breath and wheezing  Cardiovascular: Negative for chest pain     Gastrointestinal: Negative for diarrhea, nausea and vomiting  Genitourinary: Negative for dysuria  Musculoskeletal: Negative for myalgias  Skin: Negative for rash  Current Outpatient Prescriptions   Medication Sig Dispense Refill    ALPRAZolam (XANAX) 0 25 mg tablet Take 1 tablet (0 25 mg total) by mouth 3 (three) times a day as needed for anxiety 60 tablet 0    fluticasone (FLONASE) 50 mcg/act nasal spray 1 spray into each nostril daily      VENTOLIN  (90 Base) MCG/ACT inhaler Inhale 2 puffs 4 (four) times a day As directed 18 g 0    ZUBSOLV 5 7-1 4 MG SUBL   0    venlafaxine (EFFEXOR-XR) 37 5 mg 24 hr capsule Take 1 capsule by mouth Daily       No current facility-administered medications for this visit  Objective:    /70   Pulse 78   Temp (!) 97 3 °F (36 3 °C) (Temporal)   Resp 20   Ht 5' 11 5" (1 816 m)   Wt 124 kg (274 lb)   SpO2 97%   BMI 37 68 kg/m²        Physical Exam   Constitutional: He is oriented to person, place, and time  He appears well-developed and well-nourished  No distress  HENT:   Head: Normocephalic and atraumatic  Right Ear: Tympanic membrane, external ear and ear canal normal  No drainage, swelling or tenderness  No middle ear effusion  Left Ear: Tympanic membrane, external ear and ear canal normal  No drainage, swelling or tenderness  No middle ear effusion  Nose: No mucosal edema or rhinorrhea  Mouth/Throat: Oropharynx is clear and moist and mucous membranes are normal  No oropharyngeal exudate, posterior oropharyngeal edema, posterior oropharyngeal erythema or tonsillar abscesses  Eyes: Conjunctivae are normal  Right eye exhibits no discharge  Left eye exhibits no discharge  Neck: Normal range of motion  Neck supple  No thyromegaly present  Cardiovascular: Normal rate, regular rhythm and normal heart sounds  Pulmonary/Chest: Effort normal and breath sounds normal  He has no decreased breath sounds  He has no wheezes  He has no rhonchi  He has no rales     Lymphadenopathy: He has no cervical adenopathy  Neurological: He is alert and oriented to person, place, and time  Skin: Skin is warm and dry  No rash noted  He is not diaphoretic  Psychiatric: He has a normal mood and affect   His behavior is normal  Judgment and thought content normal               GLORIA Barahona

## 2018-11-13 DIAGNOSIS — F41.9 ANXIETY: ICD-10-CM

## 2018-11-13 DIAGNOSIS — J45.20 MILD INTERMITTENT ASTHMA WITHOUT COMPLICATION: ICD-10-CM

## 2018-11-13 RX ORDER — ALPRAZOLAM 0.25 MG/1
0.25 TABLET ORAL 3 TIMES DAILY PRN
Qty: 60 TABLET | Refills: 0 | Status: SHIPPED | OUTPATIENT
Start: 2018-11-13 | End: 2019-01-01 | Stop reason: SDUPTHER

## 2018-12-31 DIAGNOSIS — F41.9 ANXIETY: ICD-10-CM

## 2018-12-31 RX ORDER — ALPRAZOLAM 0.25 MG/1
0.25 TABLET ORAL 3 TIMES DAILY PRN
Qty: 60 TABLET | Refills: 0 | OUTPATIENT
Start: 2018-12-31

## 2019-01-01 DIAGNOSIS — F41.9 ANXIETY: ICD-10-CM

## 2019-01-01 RX ORDER — ALPRAZOLAM 0.25 MG/1
0.25 TABLET ORAL 3 TIMES DAILY PRN
Qty: 60 TABLET | Refills: 0 | Status: SHIPPED | OUTPATIENT
Start: 2019-01-01 | End: 2019-02-05 | Stop reason: SDUPTHER

## 2019-02-05 DIAGNOSIS — F41.9 ANXIETY: ICD-10-CM

## 2019-02-05 RX ORDER — ALPRAZOLAM 0.25 MG/1
0.25 TABLET ORAL 3 TIMES DAILY PRN
Qty: 60 TABLET | Refills: 0 | Status: SHIPPED | OUTPATIENT
Start: 2019-02-05 | End: 2019-03-14 | Stop reason: SDUPTHER

## 2019-03-14 DIAGNOSIS — F41.9 ANXIETY: ICD-10-CM

## 2019-03-14 RX ORDER — ALPRAZOLAM 0.25 MG/1
0.25 TABLET ORAL 3 TIMES DAILY PRN
Qty: 60 TABLET | Refills: 0 | Status: SHIPPED | OUTPATIENT
Start: 2019-03-14 | End: 2019-04-15 | Stop reason: SDUPTHER

## 2019-04-12 ENCOUNTER — OFFICE VISIT (OUTPATIENT)
Dept: FAMILY MEDICINE CLINIC | Facility: CLINIC | Age: 29
End: 2019-04-12
Payer: COMMERCIAL

## 2019-04-12 VITALS
WEIGHT: 283 LBS | DIASTOLIC BLOOD PRESSURE: 70 MMHG | BODY MASS INDEX: 38.33 KG/M2 | RESPIRATION RATE: 12 BRPM | OXYGEN SATURATION: 98 % | TEMPERATURE: 95.8 F | SYSTOLIC BLOOD PRESSURE: 120 MMHG | HEART RATE: 78 BPM | HEIGHT: 72 IN

## 2019-04-12 DIAGNOSIS — F41.9 ANXIETY: ICD-10-CM

## 2019-04-12 DIAGNOSIS — F41.0 PANIC DISORDER WITHOUT AGORAPHOBIA: ICD-10-CM

## 2019-04-12 DIAGNOSIS — R07.82 INTERCOSTAL PAIN: ICD-10-CM

## 2019-04-12 DIAGNOSIS — M94.0 COSTOCHONDRITIS, ACUTE: Primary | ICD-10-CM

## 2019-04-12 PROBLEM — J00 RHINOPHARYNGITIS: Status: RESOLVED | Noted: 2018-10-09 | Resolved: 2019-04-12

## 2019-04-12 PROBLEM — Z23 NEED FOR TETANUS BOOSTER: Status: RESOLVED | Noted: 2018-10-09 | Resolved: 2019-04-12

## 2019-04-12 PROBLEM — J01.40 ACUTE NON-RECURRENT PANSINUSITIS: Status: RESOLVED | Noted: 2018-04-02 | Resolved: 2019-04-12

## 2019-04-12 PROBLEM — J06.9 URTI (ACUTE UPPER RESPIRATORY INFECTION): Status: RESOLVED | Noted: 2018-04-02 | Resolved: 2019-04-12

## 2019-04-12 PROBLEM — Z23 NEED FOR INFLUENZA VACCINATION: Status: RESOLVED | Noted: 2018-10-09 | Resolved: 2019-04-12

## 2019-04-12 PROCEDURE — 93000 ELECTROCARDIOGRAM COMPLETE: CPT | Performed by: NURSE PRACTITIONER

## 2019-04-12 PROCEDURE — 3008F BODY MASS INDEX DOCD: CPT | Performed by: NURSE PRACTITIONER

## 2019-04-12 PROCEDURE — 99214 OFFICE O/P EST MOD 30 MIN: CPT | Performed by: NURSE PRACTITIONER

## 2019-04-12 RX ORDER — SERTRALINE HYDROCHLORIDE 25 MG/1
25 TABLET, FILM COATED ORAL DAILY
Qty: 30 TABLET | Refills: 3 | Status: SHIPPED | OUTPATIENT
Start: 2019-04-12 | End: 2019-08-26 | Stop reason: SDUPTHER

## 2019-04-15 ENCOUNTER — OFFICE VISIT (OUTPATIENT)
Dept: FAMILY MEDICINE CLINIC | Facility: CLINIC | Age: 29
End: 2019-04-15
Payer: COMMERCIAL

## 2019-04-15 VITALS
OXYGEN SATURATION: 98 % | SYSTOLIC BLOOD PRESSURE: 122 MMHG | HEART RATE: 79 BPM | RESPIRATION RATE: 12 BRPM | WEIGHT: 277 LBS | DIASTOLIC BLOOD PRESSURE: 70 MMHG | BODY MASS INDEX: 37.52 KG/M2 | TEMPERATURE: 98.4 F | HEIGHT: 72 IN

## 2019-04-15 DIAGNOSIS — F41.0 PANIC DISORDER WITHOUT AGORAPHOBIA: ICD-10-CM

## 2019-04-15 DIAGNOSIS — F41.9 ANXIETY: ICD-10-CM

## 2019-04-15 DIAGNOSIS — F41.9 ANXIETY: Primary | ICD-10-CM

## 2019-04-15 PROCEDURE — 99213 OFFICE O/P EST LOW 20 MIN: CPT | Performed by: FAMILY MEDICINE

## 2019-04-15 RX ORDER — ALPRAZOLAM 0.25 MG/1
0.25 TABLET ORAL 3 TIMES DAILY PRN
Qty: 60 TABLET | Refills: 0 | Status: CANCELLED | OUTPATIENT
Start: 2019-04-15

## 2019-04-15 RX ORDER — ALPRAZOLAM 0.25 MG/1
0.25 TABLET ORAL 2 TIMES DAILY PRN
Qty: 60 TABLET | Refills: 0 | Status: SHIPPED | OUTPATIENT
Start: 2019-04-15 | End: 2019-05-22 | Stop reason: SDUPTHER

## 2019-05-20 ENCOUNTER — OFFICE VISIT (OUTPATIENT)
Dept: FAMILY MEDICINE CLINIC | Facility: CLINIC | Age: 29
End: 2019-05-20
Payer: COMMERCIAL

## 2019-05-20 VITALS
OXYGEN SATURATION: 98 % | BODY MASS INDEX: 37.79 KG/M2 | SYSTOLIC BLOOD PRESSURE: 110 MMHG | WEIGHT: 279 LBS | TEMPERATURE: 96 F | HEIGHT: 72 IN | RESPIRATION RATE: 12 BRPM | HEART RATE: 106 BPM | DIASTOLIC BLOOD PRESSURE: 70 MMHG

## 2019-05-20 DIAGNOSIS — R05.9 COUGH: ICD-10-CM

## 2019-05-20 DIAGNOSIS — R09.82 POST-NASAL DRIP: ICD-10-CM

## 2019-05-20 DIAGNOSIS — J00 ACUTE RHINITIS: Primary | ICD-10-CM

## 2019-05-20 PROCEDURE — 3008F BODY MASS INDEX DOCD: CPT | Performed by: NURSE PRACTITIONER

## 2019-05-20 PROCEDURE — 1036F TOBACCO NON-USER: CPT | Performed by: NURSE PRACTITIONER

## 2019-05-20 PROCEDURE — 99213 OFFICE O/P EST LOW 20 MIN: CPT | Performed by: NURSE PRACTITIONER

## 2019-05-22 DIAGNOSIS — F41.9 ANXIETY: ICD-10-CM

## 2019-05-22 RX ORDER — ALPRAZOLAM 0.25 MG/1
0.25 TABLET ORAL 2 TIMES DAILY PRN
Qty: 60 TABLET | Refills: 0 | Status: SHIPPED | OUTPATIENT
Start: 2019-05-22 | End: 2019-07-03 | Stop reason: SDUPTHER

## 2019-07-03 DIAGNOSIS — F41.9 ANXIETY: ICD-10-CM

## 2019-07-03 RX ORDER — ALPRAZOLAM 0.25 MG/1
0.25 TABLET ORAL 2 TIMES DAILY PRN
Qty: 60 TABLET | Refills: 0 | Status: SHIPPED | OUTPATIENT
Start: 2019-07-03 | End: 2019-08-22 | Stop reason: SDUPTHER

## 2019-08-22 DIAGNOSIS — F41.9 ANXIETY: ICD-10-CM

## 2019-08-22 RX ORDER — ALPRAZOLAM 0.25 MG/1
0.25 TABLET ORAL 2 TIMES DAILY PRN
Qty: 60 TABLET | Refills: 0 | Status: SHIPPED | OUTPATIENT
Start: 2019-08-22 | End: 2019-10-25 | Stop reason: SDUPTHER

## 2019-08-26 DIAGNOSIS — F41.0 PANIC DISORDER WITHOUT AGORAPHOBIA: ICD-10-CM

## 2019-08-26 DIAGNOSIS — F41.9 ANXIETY: ICD-10-CM

## 2019-08-26 RX ORDER — SERTRALINE HYDROCHLORIDE 25 MG/1
TABLET, FILM COATED ORAL
Qty: 30 TABLET | Refills: 3 | Status: SHIPPED | OUTPATIENT
Start: 2019-08-26 | End: 2020-10-29

## 2019-10-04 ENCOUNTER — OFFICE VISIT (OUTPATIENT)
Dept: FAMILY MEDICINE CLINIC | Facility: CLINIC | Age: 29
End: 2019-10-04
Payer: COMMERCIAL

## 2019-10-04 VITALS
BODY MASS INDEX: 38.64 KG/M2 | TEMPERATURE: 97.1 F | HEART RATE: 90 BPM | OXYGEN SATURATION: 99 % | SYSTOLIC BLOOD PRESSURE: 116 MMHG | HEIGHT: 71 IN | RESPIRATION RATE: 16 BRPM | DIASTOLIC BLOOD PRESSURE: 70 MMHG | WEIGHT: 276 LBS

## 2019-10-04 DIAGNOSIS — R00.2 PALPITATIONS: Primary | ICD-10-CM

## 2019-10-04 DIAGNOSIS — F41.9 ANXIETY: ICD-10-CM

## 2019-10-04 PROBLEM — R09.82 POST-NASAL DRIP: Status: RESOLVED | Noted: 2019-05-20 | Resolved: 2019-10-04

## 2019-10-04 PROBLEM — M94.0 COSTOCHONDRITIS, ACUTE: Status: RESOLVED | Noted: 2019-04-12 | Resolved: 2019-10-04

## 2019-10-04 PROBLEM — R05.9 COUGH: Status: RESOLVED | Noted: 2019-05-20 | Resolved: 2019-10-04

## 2019-10-04 PROCEDURE — 3008F BODY MASS INDEX DOCD: CPT | Performed by: FAMILY MEDICINE

## 2019-10-04 PROCEDURE — 99213 OFFICE O/P EST LOW 20 MIN: CPT | Performed by: FAMILY MEDICINE

## 2019-10-04 PROCEDURE — 93000 ELECTROCARDIOGRAM COMPLETE: CPT | Performed by: FAMILY MEDICINE

## 2019-10-04 NOTE — PATIENT INSTRUCTIONS
PLENTY OF FLUIDS  REST  ECHO AND HOLTER WILL BE ORDERED    INCREASE POTASIUM IN YOUR DIET - BANANA DAILY, ORANGE JUICE DAILY    TRIAL OF XANAX TWICE A DAY X 1 WEEK ONLY    CALL NEXT WEEK WITH UPDATE, SOONER PRN

## 2019-10-04 NOTE — PROGRESS NOTES
Assessment/Plan:    1  Palpitations  -     POCT ECG  -     Echo complete with contrast if indicated; Future; Expected date: 10/04/2019  -     Holter monitor - 48 hour; Future; Expected date: 10/04/2019    2  Anxiety          BMI Counseling: Body mass index is 38 49 kg/m²  Discussed the patient's BMI with him  The BMI is above normal  Nutrition recommendations include decreasing overall calorie intake  Patient Instructions   PLENTY OF FLUIDS  REST  ECHO AND HOLTER WILL BE ORDERED    INCREASE POTASIUM IN YOUR DIET - BANANA DAILY, ORANGE JUICE DAILY    TRIAL OF XANAX TWICE A DAY X 1 WEEK ONLY    CALL NEXT WEEK WITH UPDATE, SOONER PRN      Return in about 2 weeks (around 10/18/2019) for Recheck  Subjective:      Patient ID: Pedro Tate is a 30 y o  male  Chief Complaint   Patient presents with    Irregular Heart Beat       Palpitations   This is a new problem  The current episode started yesterday  The problem occurs intermittently  The problem has been waxing and waning  Pertinent negatives include no abdominal pain, anorexia, arthralgias, change in bowel habit, chest pain, chills, congestion, coughing, diaphoresis, fatigue, fever, headaches, joint swelling, myalgias, nausea, neck pain, numbness, rash, sore throat, swollen glands, urinary symptoms, vertigo, visual change, vomiting or weakness  The symptoms are aggravated by stress  He has tried nothing for the symptoms  The following portions of the patient's history were reviewed and updated as appropriate: allergies, current medications, past family history, past medical history, past social history, past surgical history and problem list     Review of Systems   Constitutional: Negative for chills, diaphoresis, fatigue and fever  HENT: Negative for congestion and sore throat  Eyes: Negative for discharge  Respiratory: Negative for cough and chest tightness  Cardiovascular: Negative for chest pain and palpitations  Gastrointestinal: Negative for abdominal pain, anorexia, change in bowel habit, diarrhea, nausea and vomiting  Musculoskeletal: Negative for arthralgias, gait problem, joint swelling, myalgias and neck pain  Skin: Negative for rash  Neurological: Negative for dizziness, vertigo, weakness, numbness and headaches  Hematological: Negative for adenopathy  Psychiatric/Behavioral: The patient is not nervous/anxious  Current Outpatient Medications   Medication Sig Dispense Refill    ALPRAZolam (XANAX) 0 25 mg tablet Take 1 tablet (0 25 mg total) by mouth 2 (two) times a day as needed for anxiety 60 tablet 0    fluticasone (FLONASE) 50 mcg/act nasal spray 1 spray into each nostril daily      sertraline (ZOLOFT) 25 mg tablet take 1 tablet by mouth once daily 30 tablet 3    VENTOLIN  (90 Base) MCG/ACT inhaler Inhale 2 puffs 4 (four) times a day As directed 18 g 0    ZUBSOLV 5 7-1 4 MG SUBL   0     No current facility-administered medications for this visit  Objective:    /70 (BP Location: Left arm, Patient Position: Sitting, Cuff Size: Standard)   Pulse 90   Temp (!) 97 1 °F (36 2 °C) (Temporal)   Resp 16   Ht 5' 11" (1 803 m)   Wt 125 kg (276 lb)   SpO2 99%   BMI 38 49 kg/m²        Physical Exam   Constitutional: He is oriented to person, place, and time  He appears well-developed and well-nourished  HENT:   Head: Normocephalic and atraumatic  Eyes: Pupils are equal, round, and reactive to light  Conjunctivae and EOM are normal  Right eye exhibits no discharge  Left eye exhibits no discharge  Neck: Neck supple  No JVD present  No thyromegaly present  Cardiovascular: Normal rate, regular rhythm and normal heart sounds  No murmur heard  Pulmonary/Chest: Effort normal and breath sounds normal  He has no wheezes  He has no rales  Abdominal: Soft  Bowel sounds are normal  He exhibits no mass  There is no hepatosplenomegaly  There is no tenderness   There is no rebound, no guarding and no CVA tenderness  Musculoskeletal: Normal range of motion  He exhibits no edema, tenderness or deformity  Lymphadenopathy:     He has no cervical adenopathy  He has no axillary adenopathy  Neurological: He is alert and oriented to person, place, and time  Skin: Skin is warm and dry  No rash noted  No erythema  Psychiatric: He has a normal mood and affect   His behavior is normal  Judgment and thought content normal               Jayleen White MD

## 2019-10-22 ENCOUNTER — HOSPITAL ENCOUNTER (OUTPATIENT)
Dept: NON INVASIVE DIAGNOSTICS | Facility: HOSPITAL | Age: 29
Discharge: HOME | End: 2019-10-22
Payer: COMMERCIAL

## 2019-10-22 ENCOUNTER — TRANSCRIBE ORDERS (OUTPATIENT)
Dept: ADMINISTRATIVE | Facility: HOSPITAL | Age: 29
End: 2019-10-22

## 2019-10-22 DIAGNOSIS — R00.2 PALPITATIONS: ICD-10-CM

## 2019-10-22 PROCEDURE — 93306 TTE W/DOPPLER COMPLETE: CPT

## 2019-10-23 PROCEDURE — 93306 TTE W/DOPPLER COMPLETE: CPT | Performed by: INTERNAL MEDICINE

## 2019-10-25 DIAGNOSIS — F41.9 ANXIETY: ICD-10-CM

## 2019-10-25 RX ORDER — ALPRAZOLAM 0.25 MG/1
0.25 TABLET ORAL 2 TIMES DAILY PRN
Qty: 60 TABLET | Refills: 0 | Status: SHIPPED | OUTPATIENT
Start: 2019-10-25 | End: 2019-12-27 | Stop reason: SDUPTHER

## 2019-11-21 ENCOUNTER — HOSPITAL ENCOUNTER (EMERGENCY)
Facility: HOSPITAL | Age: 29
Discharge: HOME | End: 2019-11-21
Attending: EMERGENCY MEDICINE | Admitting: EMERGENCY MEDICINE
Payer: COMMERCIAL

## 2019-11-21 ENCOUNTER — APPOINTMENT (EMERGENCY)
Dept: RADIOLOGY | Facility: HOSPITAL | Age: 29
End: 2019-11-21
Payer: COMMERCIAL

## 2019-11-21 VITALS
HEART RATE: 96 BPM | RESPIRATION RATE: 20 BRPM | OXYGEN SATURATION: 96 % | TEMPERATURE: 97.2 F | SYSTOLIC BLOOD PRESSURE: 123 MMHG | DIASTOLIC BLOOD PRESSURE: 73 MMHG

## 2019-11-21 DIAGNOSIS — S06.0X9A MILD CONCUSSION: Primary | ICD-10-CM

## 2019-11-21 PROCEDURE — 70450 CT HEAD/BRAIN W/O DYE: CPT

## 2019-11-21 PROCEDURE — 99284 EMERGENCY DEPT VISIT MOD MDM: CPT

## 2019-11-21 RX ORDER — ACETAMINOPHEN 325 MG/1
650 TABLET ORAL ONCE
Status: COMPLETED | OUTPATIENT
Start: 2019-11-21 | End: 2019-11-21

## 2019-11-21 RX ADMIN — ACETAMINOPHEN 650 MG: 325 TABLET, FILM COATED ORAL at 15:58

## 2019-11-21 NOTE — ED NOTES
Patient transported to [facility] Meredith Cedillo RN  11/21/19 8042

## 2019-11-21 NOTE — ED PROVIDER NOTES
History  Chief Complaint   Patient presents with    Headache     pt presents to the ed with a head pain  pt states he was working and piece of metal fell on his hard had breaking the hat  pt states he has a headache      31 y/o male presenting with a head injury that occurred about 2 hours ago  Relays that he was working at home on a project wearing a hard hat when a metal object fell on to his head cracking his hard hat  He states he did not lose consciousness however has had a headache since that point in time  Has not had any nausea or vomiting  States he was slightly dizzy at 1st however the sided  Has not had any recent head injuries  Denies nausea vomiting, changes in vision, weakness, numbness, paresthesias, neck pain, light sensitivity, noise sensitivity, amnesia  Prior to Admission Medications   Prescriptions Last Dose Informant Patient Reported? Taking? ALPRAZolam (XANAX) 0 25 mg tablet   No No   Sig: Take 1 tablet (0 25 mg total) by mouth 2 (two) times a day as needed for anxiety   VENTOLIN  (90 Base) MCG/ACT inhaler  Self No No   Sig: Inhale 2 puffs 4 (four) times a day As directed   ZUBSOLV 5 7-1 4 MG SUBL  Self Yes No   fluticasone (FLONASE) 50 mcg/act nasal spray  Self Yes No   Si spray into each nostril daily   sertraline (ZOLOFT) 25 mg tablet  Self No No   Sig: take 1 tablet by mouth once daily      Facility-Administered Medications: None       Past Medical History:   Diagnosis Date    Carpal tunnel syndrome     Right - Last Assessed: 2016     Exercise-induced asthma     Last Assessed: 2014       Past Surgical History:   Procedure Laterality Date    HIP SURGERY         Family History   Problem Relation Age of Onset    Other Mother         Lymphedema     Substance Abuse Family     No Known Problems Father     No Known Problems Brother     Mental illness Neg Hx      I have reviewed and agree with the history as documented      Social History Tobacco Use    Smoking status: Former Smoker    Smokeless tobacco: Current User    Tobacco comment: vaping daily since stopped smoking 3 yrs ago   Substance Use Topics    Alcohol use: No    Drug use: No        Review of Systems   Constitutional: Negative  HENT: Negative  Eyes: Negative  Respiratory: Negative  Cardiovascular: Negative  Gastrointestinal: Negative  Genitourinary: Negative  Musculoskeletal: Negative  Skin: Negative  Neurological: Positive for dizziness and headaches  Negative for tremors, seizures, syncope, facial asymmetry, speech difficulty, weakness, light-headedness and numbness  All other systems reviewed and are negative  Physical Exam  Physical Exam   Constitutional: He is oriented to person, place, and time  He appears well-developed and well-nourished  HENT:   Head: Normocephalic and atraumatic  Right Ear: External ear normal    Left Ear: External ear normal    Nose: Nose normal    Mouth/Throat: Oropharynx is clear and moist    No signs of head injury, no hemotympanum  Eyes: Pupils are equal, round, and reactive to light  Conjunctivae and EOM are normal    Neck: Normal range of motion  Neck supple  No cervical midline tenderness whatsoever  Cardiovascular: Normal rate, regular rhythm, normal heart sounds and intact distal pulses  Exam reveals no gallop and no friction rub  No murmur heard  Pulmonary/Chest: Effort normal and breath sounds normal  No stridor  No respiratory distress  He has no wheezes  He has no rales  He exhibits no tenderness  S PO2 is 96% indicating adequate oxygenation   Abdominal: Soft  Bowel sounds are normal  He exhibits no distension and no mass  There is no tenderness  There is no rebound and no guarding  No hernia  Neurological: He is alert and oriented to person, place, and time  He has normal strength  No cranial nerve deficit or sensory deficit  He displays a negative Romberg sign   Coordination and gait normal  GCS eye subscore is 4  GCS verbal subscore is 5  GCS motor subscore is 6  Skin: Skin is warm and dry  Capillary refill takes less than 2 seconds  Nursing note and vitals reviewed  Vital Signs  ED Triage Vitals   Temperature Pulse Respirations Blood Pressure SpO2   11/21/19 1449 11/21/19 1449 11/21/19 1449 11/21/19 1449 11/21/19 1449   (!) 97 2 °F (36 2 °C) 96 20 123/73 96 %      Temp Source Heart Rate Source Patient Position - Orthostatic VS BP Location FiO2 (%)   11/21/19 1449 11/21/19 1449 -- -- --   Tympanic Monitor         Pain Score       11/21/19 1458       6           Vitals:    11/21/19 1449   BP: 123/73   Pulse: 96         Visual Acuity      ED Medications  Medications   acetaminophen (TYLENOL) tablet 650 mg (650 mg Oral Given 11/21/19 1558)       Diagnostic Studies  Results Reviewed     None                 CT head without contrast   Final Result by Frank Hendrickson MD (11/21 3492)      No acute intracranial abnormality  Workstation performed: [id number]                    Procedures  Procedures       ED Course                               MDM  Number of Diagnoses or Management Options  Diagnosis management comments: Discussed CT of the head results with patient  Will be cautious and treat as a concussion and encouraged patient to avoid any activities that may potentially cause a repeat head injury over the next 2 weeks  Will write outpatient work note over next few days and have him follow up with family doctor for reassessment in 3 days  Strict return precautions for any worsening  Patient verbalizes understanding and agrees with the above assessment plan         Amount and/or Complexity of Data Reviewed  Tests in the radiology section of CPT®: ordered and reviewed  Review and summarize past medical records: yes  Independent visualization of images, tracings, or specimens: yes        Disposition  Final diagnoses:   Mild concussion     Time reflects when diagnosis was documented in both MDM as applicable and the Disposition within this note     Time User Action Codes Description Comment    11/21/2019  4:08 PM Shelley Villasenormariama [address] [S06 0X9A] Mild concussion       ED Disposition     ED Disposition Condition Date/Time Comment    Discharge Stable Thu Nov 21, 2019  4:08 PM Chris Melchor discharge to home/self care  Follow-up Information     Follow up With Specialties Details Why Contact Info Additional [address] Emergency Department Emergency Medicine Go to  If symptoms worsen such as persistent vomiting, passing out etc [address] [facility] ED, [address] [place], [place], [address]    Johnny Mann MD Family Medicine Schedule an appointment as soon as possible for a visit in 3 days for re-evaluation of your symptoms/ concussion  [address] [facility]  [phone]       Melodie Eastman MD Orthopedic Surgery Schedule an appointment as soon as possible for a visit  for re-evaluation of your concussion like symptoms [address]  [phone]             Patient's Medications   Discharge Prescriptions    No medications on file     No discharge procedures on file      ED Provider  Electronically Signed by           Madi Farfan PA-C  11/21/19 8059

## 2019-11-22 ENCOUNTER — VBI (OUTPATIENT)
Dept: ADMINISTRATIVE | Facility: OTHER | Age: 29
End: 2019-11-22

## 2019-11-22 NOTE — TELEPHONE ENCOUNTER
Patient went to ED on 11/21 for a mild concussion  ED F/U instructions are F? U 3 days with PCP for re-evaluatin of concussion and to schedule an appt with Dr Chan Swartz for evaluation of concussion

## 2019-11-25 ENCOUNTER — VBI (OUTPATIENT)
Dept: ADMINISTRATIVE | Facility: OTHER | Age: 29
End: 2019-11-25

## 2019-11-25 NOTE — TELEPHONE ENCOUNTER
Mahsa Merry    ED Visit Information     Ed visit date: 11/22/19  Diagnosis Description:MILD CONCUSSION  In Network? Yes Anurag Rubin  Discharge status: Home  Discharged with meds ? No  Number of ED visits to date: 1  ED Severity:N/A     Outreach Information    Outreach successful: Yes 2  Date letter mailed:NO  Date Finalized:11/25/2019    Care Coordination    DECLINED  Transportation issues ?  NO    Value Consolidated Rafiq

## 2019-12-27 DIAGNOSIS — F41.9 ANXIETY: ICD-10-CM

## 2019-12-27 DIAGNOSIS — J45.20 MILD INTERMITTENT ASTHMA WITHOUT COMPLICATION: ICD-10-CM

## 2019-12-31 RX ORDER — ALPRAZOLAM 0.25 MG/1
0.25 TABLET ORAL 2 TIMES DAILY PRN
Qty: 60 TABLET | Refills: 0 | Status: SHIPPED | OUTPATIENT
Start: 2019-12-31 | End: 2020-02-10 | Stop reason: SDUPTHER

## 2020-02-10 DIAGNOSIS — F41.9 ANXIETY: ICD-10-CM

## 2020-02-10 RX ORDER — ALPRAZOLAM 0.25 MG/1
0.25 TABLET ORAL 2 TIMES DAILY PRN
Qty: 60 TABLET | Refills: 0 | Status: SHIPPED | OUTPATIENT
Start: 2020-02-10 | End: 2020-04-13 | Stop reason: SDUPTHER

## 2020-04-13 ENCOUNTER — TELEMEDICINE (OUTPATIENT)
Dept: FAMILY MEDICINE CLINIC | Facility: CLINIC | Age: 30
End: 2020-04-13
Payer: COMMERCIAL

## 2020-04-13 DIAGNOSIS — F41.0 PANIC DISORDER WITHOUT AGORAPHOBIA: ICD-10-CM

## 2020-04-13 DIAGNOSIS — F41.9 ANXIETY: Primary | ICD-10-CM

## 2020-04-13 PROCEDURE — 99213 OFFICE O/P EST LOW 20 MIN: CPT | Performed by: FAMILY MEDICINE

## 2020-04-13 RX ORDER — ALPRAZOLAM 0.25 MG/1
0.25 TABLET ORAL 2 TIMES DAILY PRN
Qty: 60 TABLET | Refills: 0 | Status: SHIPPED | OUTPATIENT
Start: 2020-04-13 | End: 2020-06-01 | Stop reason: SDUPTHER

## 2020-06-01 DIAGNOSIS — F41.9 ANXIETY: ICD-10-CM

## 2020-06-01 RX ORDER — ALPRAZOLAM 0.25 MG/1
0.25 TABLET ORAL 2 TIMES DAILY PRN
Qty: 60 TABLET | Refills: 0 | Status: SHIPPED | OUTPATIENT
Start: 2020-06-01 | End: 2020-08-04 | Stop reason: SDUPTHER

## 2020-08-04 DIAGNOSIS — F41.9 ANXIETY: ICD-10-CM

## 2020-08-04 DIAGNOSIS — J45.20 MILD INTERMITTENT ASTHMA WITHOUT COMPLICATION: ICD-10-CM

## 2020-08-05 RX ORDER — ALPRAZOLAM 0.25 MG/1
0.25 TABLET ORAL 2 TIMES DAILY PRN
Qty: 60 TABLET | Refills: 0 | Status: SHIPPED | OUTPATIENT
Start: 2020-08-05 | End: 2020-10-06 | Stop reason: SDUPTHER

## 2020-08-27 DIAGNOSIS — J45.20 MILD INTERMITTENT ASTHMA WITHOUT COMPLICATION: ICD-10-CM

## 2020-10-06 DIAGNOSIS — F41.9 ANXIETY: ICD-10-CM

## 2020-10-06 DIAGNOSIS — J45.20 MILD INTERMITTENT ASTHMA WITHOUT COMPLICATION: ICD-10-CM

## 2020-10-06 RX ORDER — ALPRAZOLAM 0.25 MG/1
0.25 TABLET ORAL 2 TIMES DAILY PRN
Qty: 60 TABLET | Refills: 0 | Status: SHIPPED | OUTPATIENT
Start: 2020-10-06 | End: 2020-12-09 | Stop reason: SDUPTHER

## 2020-10-29 ENCOUNTER — TELEMEDICINE (OUTPATIENT)
Dept: FAMILY MEDICINE CLINIC | Facility: CLINIC | Age: 30
End: 2020-10-29
Payer: COMMERCIAL

## 2020-10-29 DIAGNOSIS — F41.9 ANXIETY: ICD-10-CM

## 2020-10-29 DIAGNOSIS — F41.0 PANIC DISORDER WITHOUT AGORAPHOBIA: Primary | ICD-10-CM

## 2020-10-29 PROCEDURE — 99213 OFFICE O/P EST LOW 20 MIN: CPT | Performed by: FAMILY MEDICINE

## 2020-12-09 DIAGNOSIS — F41.9 ANXIETY: ICD-10-CM

## 2020-12-09 RX ORDER — ALPRAZOLAM 0.25 MG/1
0.25 TABLET ORAL 2 TIMES DAILY PRN
Qty: 60 TABLET | Refills: 0 | Status: SHIPPED | OUTPATIENT
Start: 2020-12-09 | End: 2021-01-27 | Stop reason: SDUPTHER

## 2021-01-27 DIAGNOSIS — F41.9 ANXIETY: ICD-10-CM

## 2021-01-27 RX ORDER — ALPRAZOLAM 0.25 MG/1
0.25 TABLET ORAL 2 TIMES DAILY PRN
Qty: 60 TABLET | Refills: 0 | Status: SHIPPED | OUTPATIENT
Start: 2021-01-27 | End: 2021-03-08 | Stop reason: SDUPTHER

## 2021-02-03 ENCOUNTER — HOSPITAL ENCOUNTER (EMERGENCY)
Facility: HOSPITAL | Age: 31
Discharge: HOME | End: 2021-02-03
Attending: EMERGENCY MEDICINE
Payer: COMMERCIAL

## 2021-02-03 VITALS
WEIGHT: 258 LBS | HEART RATE: 90 BPM | HEIGHT: 71 IN | SYSTOLIC BLOOD PRESSURE: 121 MMHG | BODY MASS INDEX: 36.12 KG/M2 | OXYGEN SATURATION: 98 % | TEMPERATURE: 97.4 F | DIASTOLIC BLOOD PRESSURE: 70 MMHG | RESPIRATION RATE: 20 BRPM

## 2021-02-03 DIAGNOSIS — R42 DIZZINESS: ICD-10-CM

## 2021-02-03 DIAGNOSIS — R51.9 HEADACHE: Primary | ICD-10-CM

## 2021-02-03 LAB
ALBUMIN SERPL BCP-MCNC: 4.3 G/DL (ref 3.5–5)
ALP SERPL-CCNC: 53 U/L (ref 46–116)
ALT SERPL W P-5'-P-CCNC: 22 U/L (ref 12–78)
ANION GAP SERPL CALCULATED.3IONS-SCNC: 7 MMOL/L (ref 4–13)
AST SERPL W P-5'-P-CCNC: 16 U/L (ref 5–45)
BASOPHILS # BLD AUTO: 0.05 THOUSANDS/ΜL (ref 0–0.1)
BASOPHILS NFR BLD AUTO: 1 % (ref 0–1)
BILIRUB SERPL-MCNC: 0.7 MG/DL (ref 0.2–1)
BUN SERPL-MCNC: 10 MG/DL (ref 5–25)
CALCIUM SERPL-MCNC: 9 MG/DL (ref 8.3–10.1)
CHLORIDE SERPL-SCNC: 101 MMOL/L (ref 100–108)
CO2 SERPL-SCNC: 28 MMOL/L (ref 21–32)
CREAT SERPL-MCNC: 1.1 MG/DL (ref 0.6–1.3)
EOSINOPHIL # BLD AUTO: 0.05 THOUSAND/ΜL (ref 0–0.61)
EOSINOPHIL NFR BLD AUTO: 1 % (ref 0–6)
ERYTHROCYTE [DISTWIDTH] IN BLOOD BY AUTOMATED COUNT: 12.9 % (ref 11.6–15.1)
GAS + CO PNL BLDA: 2.2 % (ref 0–1.5)
GFR SERPL CREATININE-BSD FRML MDRD: 90 ML/MIN/1.73SQ M
GLUCOSE SERPL-MCNC: 97 MG/DL (ref 65–140)
HCT VFR BLD AUTO: 45.9 % (ref 36.5–49.3)
HGB BLD-MCNC: 14.4 G/DL (ref 12–17)
IMM GRANULOCYTES # BLD AUTO: 0.01 THOUSAND/UL (ref 0–0.2)
IMM GRANULOCYTES NFR BLD AUTO: 0 % (ref 0–2)
LYMPHOCYTES # BLD AUTO: 0.77 THOUSANDS/ΜL (ref 0.6–4.47)
LYMPHOCYTES NFR BLD AUTO: 17 % (ref 14–44)
MAGNESIUM SERPL-MCNC: 1.7 MG/DL (ref 1.6–2.6)
MCH RBC QN AUTO: 26 PG (ref 26.8–34.3)
MCHC RBC AUTO-ENTMCNC: 31.4 G/DL (ref 31.4–37.4)
MCV RBC AUTO: 83 FL (ref 82–98)
MONOCYTES # BLD AUTO: 0.79 THOUSAND/ΜL (ref 0.17–1.22)
MONOCYTES NFR BLD AUTO: 18 % (ref 4–12)
NEUTROPHILS # BLD AUTO: 2.8 THOUSANDS/ΜL (ref 1.85–7.62)
NEUTS SEG NFR BLD AUTO: 63 % (ref 43–75)
NRBC BLD AUTO-RTO: 0 /100 WBCS
PLATELET # BLD AUTO: 180 THOUSANDS/UL (ref 149–390)
PMV BLD AUTO: 11.3 FL (ref 8.9–12.7)
POTASSIUM SERPL-SCNC: 3.6 MMOL/L (ref 3.5–5.3)
PROT SERPL-MCNC: 7.8 G/DL (ref 6.4–8.2)
RBC # BLD AUTO: 5.53 MILLION/UL (ref 3.88–5.62)
SODIUM SERPL-SCNC: 136 MMOL/L (ref 136–145)
WBC # BLD AUTO: 4.47 THOUSAND/UL (ref 4.31–10.16)

## 2021-02-03 PROCEDURE — 96365 THER/PROPH/DIAG IV INF INIT: CPT

## 2021-02-03 PROCEDURE — 96375 TX/PRO/DX INJ NEW DRUG ADDON: CPT

## 2021-02-03 PROCEDURE — 99284 EMERGENCY DEPT VISIT MOD MDM: CPT

## 2021-02-03 PROCEDURE — 85025 COMPLETE CBC W/AUTO DIFF WBC: CPT | Performed by: EMERGENCY MEDICINE

## 2021-02-03 PROCEDURE — 82375 ASSAY CARBOXYHB QUANT: CPT | Performed by: EMERGENCY MEDICINE

## 2021-02-03 PROCEDURE — 93005 ELECTROCARDIOGRAM TRACING: CPT

## 2021-02-03 PROCEDURE — 99285 EMERGENCY DEPT VISIT HI MDM: CPT | Performed by: EMERGENCY MEDICINE

## 2021-02-03 PROCEDURE — 80053 COMPREHEN METABOLIC PANEL: CPT | Performed by: EMERGENCY MEDICINE

## 2021-02-03 PROCEDURE — 83735 ASSAY OF MAGNESIUM: CPT | Performed by: EMERGENCY MEDICINE

## 2021-02-03 PROCEDURE — 36415 COLL VENOUS BLD VENIPUNCTURE: CPT | Performed by: EMERGENCY MEDICINE

## 2021-02-03 RX ORDER — METOCLOPRAMIDE HYDROCHLORIDE 5 MG/ML
10 INJECTION INTRAMUSCULAR; INTRAVENOUS ONCE
Status: COMPLETED | OUTPATIENT
Start: 2021-02-03 | End: 2021-02-03

## 2021-02-03 RX ORDER — DIPHENHYDRAMINE HYDROCHLORIDE 50 MG/ML
25 INJECTION INTRAMUSCULAR; INTRAVENOUS ONCE
Status: COMPLETED | OUTPATIENT
Start: 2021-02-03 | End: 2021-02-03

## 2021-02-03 RX ORDER — MAGNESIUM SULFATE HEPTAHYDRATE 40 MG/ML
2 INJECTION, SOLUTION INTRAVENOUS ONCE
Status: COMPLETED | OUTPATIENT
Start: 2021-02-03 | End: 2021-02-03

## 2021-02-03 RX ORDER — KETOROLAC TROMETHAMINE 30 MG/ML
30 INJECTION, SOLUTION INTRAMUSCULAR; INTRAVENOUS ONCE
Status: COMPLETED | OUTPATIENT
Start: 2021-02-03 | End: 2021-02-03

## 2021-02-03 RX ADMIN — KETOROLAC TROMETHAMINE 30 MG: 30 INJECTION, SOLUTION INTRAMUSCULAR at 06:42

## 2021-02-03 RX ADMIN — MAGNESIUM SULFATE HEPTAHYDRATE 2 G: 40 INJECTION, SOLUTION INTRAVENOUS at 06:54

## 2021-02-03 RX ADMIN — SODIUM CHLORIDE 1000 ML: 0.9 INJECTION, SOLUTION INTRAVENOUS at 06:29

## 2021-02-03 RX ADMIN — DIPHENHYDRAMINE HYDROCHLORIDE 25 MG: 50 INJECTION, SOLUTION INTRAMUSCULAR; INTRAVENOUS at 06:41

## 2021-02-03 RX ADMIN — METOCLOPRAMIDE 10 MG: 5 INJECTION, SOLUTION INTRAMUSCULAR; INTRAVENOUS at 06:42

## 2021-02-03 NOTE — Clinical Note
Vivek Valente was seen and treated in our emergency department on 2/3/2021  Diagnosis:     Ramya Woodward  may return to work on return date  He may return on this date: 02/04/2021         If you have any questions or concerns, please don't hesitate to call        Debora Alatorre MD    ______________________________           _______________          _______________  Hospital Representative                              Date                                Time

## 2021-02-03 NOTE — ED PROVIDER NOTES
History  Chief Complaint   Patient presents with   Gene Edy Monoxide Exposure     Pt stating he was snow blowing outside last night for about 3 hours, then feeling dizzy, headcahe, nauseous  Denies any fall, denies injury, no cp or sob       History provided by:  Patient   used: No         Patient presents via private vehicle for further evaluation treatment of dizziness, lightheadedness, headache  Patient states he was awake all night snow blowing  He did not eat or drink during this activity  He now has a headache, dizziness and is concerned about possible carbon monoxide exposure  Denies any significant history of headaches or cardiopulmonary abnormalities  Denies any chest pain, shortness of breath, abdominal pain, nausea, vomiting, diarrhea  Did not take any medications prior to my evaluation  Prior to Admission Medications   Prescriptions Last Dose Informant Patient Reported? Taking?    ALPRAZolam (XANAX) 0 25 mg tablet 2021 at Unknown time  No Yes   Sig: Take 1 tablet (0 25 mg total) by mouth 2 (two) times a day as needed for anxiety   Ventolin  (90 Base) MCG/ACT inhaler Unknown at Unknown time  No No   Sig: Inhale 2 puffs every 4 (four) hours as needed for wheezing   ZUBSOLV 5 7-1 4 MG SUBL 2021 at Unknown time Self Yes Yes   fluticasone (FLONASE) 50 mcg/act nasal spray Unknown at Unknown time Self Yes No   Si spray into each nostril daily      Facility-Administered Medications: None       Past Medical History:   Diagnosis Date    Carpal tunnel syndrome     Right - Last Assessed: 2016     Exercise-induced asthma     Last Assessed: 2014       Past Surgical History:   Procedure Laterality Date    HIP SURGERY         Family History   Problem Relation Age of Onset    Other Mother         Lymphedema     Substance Abuse Family     No Known Problems Father     No Known Problems Brother     Mental illness Neg Hx      I have reviewed and agree with the history as documented  E-Cigarette/Vaping     E-Cigarette/Vaping Substances    Flavoring Yes      Social History     Tobacco Use    Smoking status: Former Smoker    Smokeless tobacco: Current User    Tobacco comment: vaping daily since stopped smoking 3 yrs ago   Substance Use Topics    Alcohol use: No    Drug use: No       Review of Systems   Neurological: Positive for dizziness and headaches  All other systems reviewed and are negative  Physical Exam  Physical Exam  Vitals signs and nursing note reviewed  Constitutional:       General: He is not in acute distress  Appearance: He is well-developed  He is not diaphoretic  HENT:      Head: Normocephalic and atraumatic  Right Ear: External ear normal       Left Ear: External ear normal    Eyes:      General:         Right eye: No discharge  Left eye: No discharge  Pupils: Pupils are equal, round, and reactive to light  Neck:      Musculoskeletal: Normal range of motion and neck supple  Thyroid: No thyromegaly  Trachea: No tracheal deviation  Cardiovascular:      Rate and Rhythm: Normal rate and regular rhythm  Heart sounds: No murmur  Pulmonary:      Effort: Pulmonary effort is normal       Breath sounds: Normal breath sounds  Abdominal:      General: Bowel sounds are normal  There is no distension  Palpations: Abdomen is soft  Tenderness: There is no abdominal tenderness  Musculoskeletal: Normal range of motion  General: No deformity  Skin:     General: Skin is warm  Capillary Refill: Capillary refill takes less than 2 seconds  Neurological:      Mental Status: He is alert and oriented to person, place, and time  Cranial Nerves: No cranial nerve deficit  Motor: No abnormal muscle tone  Comments: Normal cranial nerve exam   Normal strength and sensation bilateral upper and lower extremities  Normal coordination, normal gait  GCS 15  Psychiatric:         Behavior: Behavior normal          Vital Signs  ED Triage Vitals   Temperature Pulse Respirations Blood Pressure SpO2   02/03/21 0617 02/03/21 0617 02/03/21 0617 02/03/21 0617 02/03/21 0617   (!) 97 4 °F (36 3 °C) 101 21 157/83 98 %      Temp Source Heart Rate Source Patient Position - Orthostatic VS BP Location FiO2 (%)   02/03/21 0617 02/03/21 0617 02/03/21 0617 02/03/21 0617 --   Tympanic Monitor Sitting Right arm       Pain Score       02/03/21 0642       5           Vitals:    02/03/21 0617   BP: 157/83   Pulse: 101   Patient Position - Orthostatic VS: Sitting         Visual Acuity      ED Medications  Medications   sodium chloride 0 9 % bolus 1,000 mL (1,000 mL Intravenous New Bag 2/3/21 0629)   magnesium sulfate 2 g/50 mL IVPB (premix) 2 g (2 g Intravenous New Bag 2/3/21 0654)   diphenhydrAMINE (BENADRYL) injection 25 mg (25 mg Intravenous Given 2/3/21 0641)   metoclopramide (REGLAN) injection 10 mg (10 mg Intravenous Given 2/3/21 0642)   ketorolac (TORADOL) injection 30 mg (30 mg Intravenous Given 2/3/21 0642)       Diagnostic Studies  Results Reviewed     Procedure Component Value Units Date/Time    Comprehensive metabolic panel [[id number]] Collected: 02/03/21 3056    Lab Status: Final result Specimen: Blood from Arm, Left Updated: 02/03/21 1599     Sodium 136 mmol/L      Potassium 3 6 mmol/L      Chloride 101 mmol/L      CO2 28 mmol/L      ANION GAP 7 mmol/L      BUN 10 mg/dL      Creatinine 1 10 mg/dL      Glucose 97 mg/dL      Calcium 9 0 mg/dL      AST 16 U/L      ALT 22 U/L      Alkaline Phosphatase 53 U/L      Total Protein 7 8 g/dL      Albumin 4 3 g/dL      Total Bilirubin 0 70 mg/dL      eGFR 90 ml/min/1 73sq m     Narrative:      Gianna guidelines for Chronic Kidney Disease (CKD):     Stage 1 with normal or high GFR (GFR > 90 mL/min/1 73 square meters)    Stage 2 Mild CKD (GFR = 60-89 mL/min/1 73 square meters)    Stage 3A Moderate CKD (GFR = 45-59 mL/min/1 73 square meters)    Stage 3B Moderate CKD (GFR = 30-44 mL/min/1 73 square meters)    Stage 4 Severe CKD (GFR = 15-29 mL/min/1 73 square meters)    Stage 5 End Stage CKD (GFR <15 mL/min/1 73 square meters)  Note: GFR calculation is accurate only with a steady state creatinine    Magnesium [[id number]]  (Normal) Collected: 02/03/21 0627    Lab Status: Final result Specimen: Blood from Arm, Left Updated: 02/03/21 0702     Magnesium 1 7 mg/dL     Carboxyhemoglobin [[id number]]  (Abnormal) Collected: 02/03/21 8855    Lab Status: Final result Specimen: Blood from Arm, Left Updated: 02/03/21 0656     Carbon Monoxide, Blood 2 2 %     Narrative:       Therapeutic levels (1 mg/mL and 2 mg/mL) of hydroxocobalamin may interfere with the fCOHb and fMetHb where it may cause lower than expected values  Normal Carboxyhemoglobin range for nonsmokers is <1 5%   Normal Carboxyhemoglobin range for smokers is 1 5% to 5 1%     CBC and differential [[id number]]  (Abnormal) Collected: 02/03/21 0627    Lab Status: Final result Specimen: Blood from Arm, Left Updated: 02/03/21 0638     WBC 4 47 Thousand/uL      RBC 5 53 Million/uL      Hemoglobin 14 4 g/dL      Hematocrit 45 9 %      MCV 83 fL      MCH 26 0 pg      MCHC 31 4 g/dL      RDW 12 9 %      MPV 11 3 fL      Platelets 151 Thousands/uL      nRBC 0 /100 WBCs      Neutrophils Relative 63 %      Immat GRANS % 0 %      Lymphocytes Relative 17 %      Monocytes Relative 18 %      Eosinophils Relative 1 %      Basophils Relative 1 %      Neutrophils Absolute 2 80 Thousands/µL      Immature Grans Absolute 0 01 Thousand/uL      Lymphocytes Absolute 0 77 Thousands/µL      Monocytes Absolute 0 79 Thousand/µL      Eosinophils Absolute 0 05 Thousand/µL      Basophils Absolute 0 05 Thousands/µL                  No orders to display              Procedures  ECG 12 Lead Documentation Only    Date/Time: 2/3/2021 6:20 AM  Performed by: Palomo Najera MD  Authorized by: Palomo Najera MD     Indications / Diagnosis:  Dizziness  ECG reviewed by me, the ED Provider: yes    Patient location:  ED  Interpretation:     Interpretation: abnormal    Rate:     ECG rate:  112    ECG rate assessment: tachycardic    Rhythm:     Rhythm: sinus tachycardia    Ectopy:     Ectopy: none    QRS:     QRS axis:  Normal    QRS intervals:  Normal  Conduction:     Conduction: normal    ST segments:     ST segments:  Normal  T waves:     T waves: normal               ED Course                                           MDM  Number of Diagnoses or Management Options  Dizziness: new and requires workup  Headache: new and requires workup  Diagnosis management comments: Patient with lightheadedness, dizziness, headache status post snow blowing this evening  Patient concerned about carbon monoxide  Patient with no significant cardiopulmonary history  Reports not eating or drinking throughout this episode  No chest pain  Labs okay  Headache cocktail given to patient  Nonfocal neuro exam, no trauma, no emergent imaging indicated  carboxyhemoglobin slightly elevated but not in level of acute carbon monoxide poisoning  Likely secondary to smoking  Return precautions discussed, patient stable time of discharge             Amount and/or Complexity of Data Reviewed  Clinical lab tests: ordered and reviewed    Risk of Complications, Morbidity, and/or Mortality  Presenting problems: moderate  Diagnostic procedures: moderate  Management options: moderate    Patient Progress  Patient progress: improved      Disposition  Final diagnoses:   Headache   Dizziness     Time reflects when diagnosis was documented in both MDM as applicable and the Disposition within this note     Time User Action Codes Description Comment    2/3/2021  6:20 AM Kavita Doing Add [R51 9] Headache     2/3/2021  6:20 AM Kavita Doing Add [R42] Dizziness       ED Disposition     ED Disposition Condition Date/Time Comment    Discharge Stable Wed Feb 3, 2021 6:46 AM Carlos Manuel Monroy discharge to home/self care  Follow-up Information     Follow up With Specialties Details Why Contact Info Additional Information    Guy Lynne MD Family Medicine Schedule an appointment as soon as possible for a visit in 1 day As needed [address] [facility]       [address] [facility] Emergency Department Emergency Medicine Go to  If symptoms worsen [address] [address] Emergency Department, [place], [place], [facility]          Patient's Medications   Discharge Prescriptions    No medications on file     No discharge procedures on file      PDMP Review       Value Time User    PDMP Reviewed  Yes 1/27/2021  3:55 PM Guy Lynne MD          ED Provider  Electronically Signed by           Ed Hallman MD  02/03/21 3299

## 2021-02-04 ENCOUNTER — VBI (OUTPATIENT)
Dept: FAMILY MEDICINE CLINIC | Facility: CLINIC | Age: 31
End: 2021-02-04

## 2021-02-04 NOTE — TELEPHONE ENCOUNTER
Chata Fischer    ED Visit Information     Ed visit date: 2/3/201  Diagnosis Description: CARBON MONOXIDE EXPOSURE  In Network? Yes Darliss Flood  Discharge status: Home  Discharged with meds ? No  Number of ED visits to date: 1  ED Severity:N  A     Outreach Information    Outreach successful: Yes 1  Date letter mailed:N/A  Date Finalized:2/3/2021    Care Coordination    Follow up appointment with pcp: no PATIENT DECLINED  Transportation issues ?  No    Value Consolidated Rafiq

## 2021-02-05 LAB
ATRIAL RATE: 112 BPM
P AXIS: 66 DEGREES
PR INTERVAL: 150 MS
QRS AXIS: 59 DEGREES
QRSD INTERVAL: 104 MS
QT INTERVAL: 324 MS
QTC INTERVAL: 442 MS
T WAVE AXIS: 30 DEGREES
VENTRICULAR RATE: 112 BPM

## 2021-02-05 PROCEDURE — 93010 ELECTROCARDIOGRAM REPORT: CPT | Performed by: INTERNAL MEDICINE

## 2021-03-08 DIAGNOSIS — J45.20 MILD INTERMITTENT ASTHMA WITHOUT COMPLICATION: ICD-10-CM

## 2021-03-08 DIAGNOSIS — F41.9 ANXIETY: ICD-10-CM

## 2021-03-08 RX ORDER — ALPRAZOLAM 0.25 MG/1
0.25 TABLET ORAL 2 TIMES DAILY PRN
Qty: 60 TABLET | Refills: 0 | Status: SHIPPED | OUTPATIENT
Start: 2021-03-08 | End: 2021-04-30 | Stop reason: SDUPTHER

## 2021-04-28 PROBLEM — R00.2 PALPITATIONS: Status: RESOLVED | Noted: 2019-10-04 | Resolved: 2021-04-28

## 2021-04-30 ENCOUNTER — TELEMEDICINE (OUTPATIENT)
Dept: FAMILY MEDICINE CLINIC | Facility: CLINIC | Age: 31
End: 2021-04-30
Payer: COMMERCIAL

## 2021-04-30 DIAGNOSIS — J45.20 MILD INTERMITTENT ASTHMA WITHOUT COMPLICATION: ICD-10-CM

## 2021-04-30 DIAGNOSIS — F41.9 ANXIETY: Primary | ICD-10-CM

## 2021-04-30 DIAGNOSIS — F41.0 PANIC DISORDER WITHOUT AGORAPHOBIA: ICD-10-CM

## 2021-04-30 PROCEDURE — 1036F TOBACCO NON-USER: CPT | Performed by: FAMILY MEDICINE

## 2021-04-30 PROCEDURE — 3725F SCREEN DEPRESSION PERFORMED: CPT | Performed by: FAMILY MEDICINE

## 2021-04-30 PROCEDURE — 99213 OFFICE O/P EST LOW 20 MIN: CPT | Performed by: FAMILY MEDICINE

## 2021-04-30 RX ORDER — ALPRAZOLAM 0.25 MG/1
0.25 TABLET ORAL 2 TIMES DAILY PRN
Qty: 60 TABLET | Refills: 0 | Status: SHIPPED | OUTPATIENT
Start: 2021-04-30 | End: 2021-06-16 | Stop reason: SDUPTHER

## 2021-04-30 NOTE — PROGRESS NOTES
Virtual Regular Visit      Assessment/Plan:    Problem List Items Addressed This Visit        Respiratory    Mild intermittent asthma without complication       Other    Anxiety - Primary    Relevant Medications    ALPRAZolam (XANAX) 0 25 mg tablet    Panic disorder without agoraphobia    Relevant Medications    ALPRAZolam (XANAX) 0 25 mg tablet          BMI Counseling: There is no height or weight on file to calculate BMI  The BMI is above normal  Nutrition recommendations include encouraging healthy choices of fruits and vegetables and moderation in carbohydrate intake  Exercise recommendations include exercising 3-5 times per week  No pharmacotherapy was ordered  Tobacco Cessation Counseling: Tobacco cessation counseling was not provided  The patient is sincerely urged to quit consumption of tobacco  He is not ready to quit tobacco  Patient refused medication  Reason for visit is   Chief Complaint   Patient presents with    Virtual Regular Visit        Encounter provider Julissa Ferguson MD    Provider located at [address] [place] [phone]      Recent Visits  Date Type Provider Dept   04/28/21 Telemedicine Julissa Ferguson MD [address] [place] [address] Physicians   Showing recent visits within past 7 days and meeting all other requirements     Today's Visits  Date Type Provider Dept   04/30/21 Telemedicine Julissa Ferguson MD [facility] [place] [facility]   Showing today's visits and meeting all other requirements     Future Appointments  No visits were found meeting these conditions  Showing future appointments within next 150 days and meeting all other requirements        The patient was identified by name and date of birth  Jenny Neighbor was informed that this is a telemedicine visit and that the visit is being conducted through [address] [place] [address] Now and patient was informed that this is a secure, HIPAA-compliant platform   He agrees to proceed     My office door was closed  No one else was in the room  He acknowledged consent and understanding of privacy and security of the video platform  The patient has agreed to participate and understands they can discontinue the visit at any time  Patient is aware this is a billable service  Subjective  Kalyan Colbert is a 35 y o  male      FOLLOW UP    MEDICATION CHECK    DOING WELL  VERY RARE PANIC ATTACKS  CUTTING BACK ON MEDICATIONS      NO CONCERNS       Past Medical History:   Diagnosis Date    Carpal tunnel syndrome     Right - Last Assessed: April 4, 2016     Exercise-induced asthma     Last Assessed: September 16, 2014       Past Surgical History:   Procedure Laterality Date    HIP SURGERY         Current Outpatient Medications   Medication Sig Dispense Refill    ALPRAZolam (XANAX) 0 25 mg tablet Take 1 tablet (0 25 mg total) by mouth 2 (two) times a day as needed for anxiety 60 tablet 0    fluticasone (FLONASE) 50 mcg/act nasal spray 1 spray into each nostril daily      Ventolin  (90 Base) MCG/ACT inhaler Inhale 2 puffs every 4 (four) hours as needed for wheezing 18 g 0    ZUBSOLV 5 7-1 4 MG SUBL daily   0     No current facility-administered medications for this visit  Allergies   Allergen Reactions    Molds & Smuts Itching       Review of Systems   Constitutional: Negative for chills, fatigue and fever  HENT: Negative for sore throat  Eyes: Negative for discharge  Respiratory: Negative for cough and chest tightness  Cardiovascular: Negative for chest pain and palpitations  Gastrointestinal: Negative for abdominal pain, diarrhea, nausea and vomiting  Musculoskeletal: Negative for arthralgias and gait problem  Neurological: Negative for dizziness, weakness and headaches  Hematological: Negative for adenopathy  Psychiatric/Behavioral: The patient is nervous/anxious          Video Exam    There were no vitals filed for this visit     Physical Exam     PATIENT VISUALLY APPEARS WELL IN NO OBVIOUS DISTRESS      I spent 15 minutes directly with the patient during this visit      VIRTUAL VISIT DISCLAIMER    Pimentel Karen acknowledges that he has consented to an online visit or consultation  He understands that the online visit is based solely on information provided by him, and that, in the absence of a face-to-face physical evaluation by the physician, the diagnosis he receives is both limited and provisional in terms of accuracy and completeness  This is not intended to replace a full medical face-to-face evaluation by the physician  Margarito Jones understands and accepts these terms

## 2021-06-16 DIAGNOSIS — J45.20 MILD INTERMITTENT ASTHMA WITHOUT COMPLICATION: ICD-10-CM

## 2021-06-16 DIAGNOSIS — F41.9 ANXIETY: ICD-10-CM

## 2021-06-16 RX ORDER — ALPRAZOLAM 0.25 MG/1
0.25 TABLET ORAL 2 TIMES DAILY PRN
Qty: 60 TABLET | Refills: 0 | Status: SHIPPED | OUTPATIENT
Start: 2021-06-16 | End: 2021-08-12 | Stop reason: SDUPTHER

## 2021-07-07 DIAGNOSIS — J45.20 MILD INTERMITTENT ASTHMA WITHOUT COMPLICATION: ICD-10-CM

## 2021-07-07 RX ORDER — ALBUTEROL SULFATE 90 UG/1
AEROSOL, METERED RESPIRATORY (INHALATION)
Qty: 8.5 G | Refills: 1 | Status: SHIPPED | OUTPATIENT
Start: 2021-07-07 | End: 2021-10-18 | Stop reason: SDUPTHER

## 2021-08-12 DIAGNOSIS — F41.9 ANXIETY: ICD-10-CM

## 2021-08-12 NOTE — TELEPHONE ENCOUNTER
Requested medication(s) are due for refill today: No  Patient has already received a courtesy refill: Yes  Other reason request has been forwarded to provider:  This refill cannot be delegated

## 2021-08-15 RX ORDER — ALPRAZOLAM 0.25 MG/1
0.25 TABLET ORAL 2 TIMES DAILY PRN
Qty: 60 TABLET | Refills: 0 | Status: SHIPPED | OUTPATIENT
Start: 2021-08-15 | End: 2021-10-18 | Stop reason: SDUPTHER

## 2021-10-18 DIAGNOSIS — J45.20 MILD INTERMITTENT ASTHMA WITHOUT COMPLICATION: ICD-10-CM

## 2021-10-18 DIAGNOSIS — F41.9 ANXIETY: ICD-10-CM

## 2021-10-18 RX ORDER — ALBUTEROL SULFATE 90 UG/1
2 AEROSOL, METERED RESPIRATORY (INHALATION) EVERY 4 HOURS PRN
Qty: 8.5 G | Refills: 1 | Status: SHIPPED | OUTPATIENT
Start: 2021-10-18

## 2021-10-18 RX ORDER — ALPRAZOLAM 0.25 MG/1
0.25 TABLET ORAL 2 TIMES DAILY PRN
Qty: 60 TABLET | Refills: 0 | Status: SHIPPED | OUTPATIENT
Start: 2021-10-18 | End: 2021-10-26 | Stop reason: SDUPTHER

## 2021-10-25 ENCOUNTER — TELEPHONE (OUTPATIENT)
Dept: FAMILY MEDICINE CLINIC | Facility: CLINIC | Age: 31
End: 2021-10-25

## 2021-11-03 ENCOUNTER — TELEPHONE (OUTPATIENT)
Dept: FAMILY MEDICINE CLINIC | Facility: CLINIC | Age: 31
End: 2021-11-03

## 2021-12-18 ENCOUNTER — HOSPITAL ENCOUNTER (EMERGENCY)
Facility: HOSPITAL | Age: 31
Discharge: HOME | End: 2021-12-19
Attending: EMERGENCY MEDICINE
Payer: COMMERCIAL

## 2021-12-18 DIAGNOSIS — R07.89 ATYPICAL CHEST PAIN: Primary | ICD-10-CM

## 2021-12-18 PROCEDURE — 99285 EMERGENCY DEPT VISIT HI MDM: CPT

## 2021-12-18 PROCEDURE — 99285 EMERGENCY DEPT VISIT HI MDM: CPT | Performed by: EMERGENCY MEDICINE

## 2021-12-18 PROCEDURE — 93005 ELECTROCARDIOGRAM TRACING: CPT

## 2021-12-18 RX ORDER — KETOROLAC TROMETHAMINE 30 MG/ML
15 INJECTION, SOLUTION INTRAMUSCULAR; INTRAVENOUS ONCE
Status: DISCONTINUED | OUTPATIENT
Start: 2021-12-19 | End: 2021-12-19 | Stop reason: HOSPADM

## 2021-12-19 ENCOUNTER — APPOINTMENT (EMERGENCY)
Dept: RADIOLOGY | Facility: HOSPITAL | Age: 31
End: 2021-12-19
Payer: COMMERCIAL

## 2021-12-19 VITALS
OXYGEN SATURATION: 97 % | SYSTOLIC BLOOD PRESSURE: 143 MMHG | WEIGHT: 250 LBS | RESPIRATION RATE: 18 BRPM | BODY MASS INDEX: 34.87 KG/M2 | DIASTOLIC BLOOD PRESSURE: 80 MMHG | HEART RATE: 80 BPM

## 2021-12-19 LAB
ALBUMIN SERPL BCP-MCNC: 4.3 G/DL (ref 3.5–5)
ALP SERPL-CCNC: 59 U/L (ref 46–116)
ALT SERPL W P-5'-P-CCNC: 36 U/L (ref 12–78)
ANION GAP SERPL CALCULATED.3IONS-SCNC: 10 MMOL/L (ref 4–13)
AST SERPL W P-5'-P-CCNC: 20 U/L (ref 5–45)
BASOPHILS # BLD AUTO: 0.07 THOUSANDS/ΜL (ref 0–0.1)
BASOPHILS NFR BLD AUTO: 1 % (ref 0–1)
BILIRUB SERPL-MCNC: 0.48 MG/DL (ref 0.2–1)
BUN SERPL-MCNC: 14 MG/DL (ref 5–25)
CALCIUM SERPL-MCNC: 9 MG/DL (ref 8.3–10.1)
CARDIAC TROPONIN I PNL SERPL HS: <2 NG/L
CHLORIDE SERPL-SCNC: 103 MMOL/L (ref 100–108)
CO2 SERPL-SCNC: 28 MMOL/L (ref 21–32)
CREAT SERPL-MCNC: 0.92 MG/DL (ref 0.6–1.3)
EOSINOPHIL # BLD AUTO: 0.09 THOUSAND/ΜL (ref 0–0.61)
EOSINOPHIL NFR BLD AUTO: 1 % (ref 0–6)
ERYTHROCYTE [DISTWIDTH] IN BLOOD BY AUTOMATED COUNT: 13 % (ref 11.6–15.1)
GFR SERPL CREATININE-BSD FRML MDRD: 111 ML/MIN/1.73SQ M
GLUCOSE SERPL-MCNC: 146 MG/DL (ref 65–140)
HCT VFR BLD AUTO: 44.4 % (ref 36.5–49.3)
HGB BLD-MCNC: 14.3 G/DL (ref 12–17)
IMM GRANULOCYTES # BLD AUTO: 0.02 THOUSAND/UL (ref 0–0.2)
IMM GRANULOCYTES NFR BLD AUTO: 0 % (ref 0–2)
LYMPHOCYTES # BLD AUTO: 1.34 THOUSANDS/ΜL (ref 0.6–4.47)
LYMPHOCYTES NFR BLD AUTO: 17 % (ref 14–44)
MCH RBC QN AUTO: 26.7 PG (ref 26.8–34.3)
MCHC RBC AUTO-ENTMCNC: 32.2 G/DL (ref 31.4–37.4)
MCV RBC AUTO: 83 FL (ref 82–98)
MONOCYTES # BLD AUTO: 0.67 THOUSAND/ΜL (ref 0.17–1.22)
MONOCYTES NFR BLD AUTO: 8 % (ref 4–12)
NEUTROPHILS # BLD AUTO: 5.81 THOUSANDS/ΜL (ref 1.85–7.62)
NEUTS SEG NFR BLD AUTO: 73 % (ref 43–75)
NRBC BLD AUTO-RTO: 0 /100 WBCS
PLATELET # BLD AUTO: 202 THOUSANDS/UL (ref 149–390)
PMV BLD AUTO: 11 FL (ref 8.9–12.7)
POTASSIUM SERPL-SCNC: 3.5 MMOL/L (ref 3.5–5.3)
PROT SERPL-MCNC: 7.8 G/DL (ref 6.4–8.2)
RBC # BLD AUTO: 5.36 MILLION/UL (ref 3.88–5.62)
SODIUM SERPL-SCNC: 141 MMOL/L (ref 136–145)
WBC # BLD AUTO: 8 THOUSAND/UL (ref 4.31–10.16)

## 2021-12-19 PROCEDURE — 96360 HYDRATION IV INFUSION INIT: CPT

## 2021-12-19 PROCEDURE — 71045 X-RAY EXAM CHEST 1 VIEW: CPT

## 2021-12-19 PROCEDURE — 36415 COLL VENOUS BLD VENIPUNCTURE: CPT | Performed by: EMERGENCY MEDICINE

## 2021-12-19 PROCEDURE — 80053 COMPREHEN METABOLIC PANEL: CPT | Performed by: EMERGENCY MEDICINE

## 2021-12-19 PROCEDURE — 84484 ASSAY OF TROPONIN QUANT: CPT | Performed by: EMERGENCY MEDICINE

## 2021-12-19 PROCEDURE — 85025 COMPLETE CBC W/AUTO DIFF WBC: CPT | Performed by: EMERGENCY MEDICINE

## 2021-12-19 RX ADMIN — SODIUM CHLORIDE 1000 ML: 0.9 INJECTION, SOLUTION INTRAVENOUS at 00:08

## 2021-12-20 ENCOUNTER — OFFICE VISIT (OUTPATIENT)
Dept: FAMILY MEDICINE CLINIC | Facility: CLINIC | Age: 31
End: 2021-12-20
Payer: COMMERCIAL

## 2021-12-20 VITALS
OXYGEN SATURATION: 97 % | BODY MASS INDEX: 37.38 KG/M2 | DIASTOLIC BLOOD PRESSURE: 84 MMHG | TEMPERATURE: 98 F | SYSTOLIC BLOOD PRESSURE: 118 MMHG | HEART RATE: 72 BPM | WEIGHT: 267 LBS | HEIGHT: 71 IN | RESPIRATION RATE: 12 BRPM

## 2021-12-20 DIAGNOSIS — F41.0 PANIC DISORDER WITHOUT AGORAPHOBIA: ICD-10-CM

## 2021-12-20 DIAGNOSIS — J45.20 MILD INTERMITTENT ASTHMA WITHOUT COMPLICATION: ICD-10-CM

## 2021-12-20 DIAGNOSIS — F41.9 ANXIETY: Primary | ICD-10-CM

## 2021-12-20 DIAGNOSIS — R07.81 PLEURITIC CHEST PAIN: ICD-10-CM

## 2021-12-20 LAB
ATRIAL RATE: 95 BPM
P AXIS: 51 DEGREES
PR INTERVAL: 160 MS
QRS AXIS: 29 DEGREES
QRSD INTERVAL: 108 MS
QT INTERVAL: 366 MS
QTC INTERVAL: 459 MS
T WAVE AXIS: 19 DEGREES
VENTRICULAR RATE: 95 BPM

## 2021-12-20 PROCEDURE — 99214 OFFICE O/P EST MOD 30 MIN: CPT | Performed by: FAMILY MEDICINE

## 2021-12-20 PROCEDURE — 3008F BODY MASS INDEX DOCD: CPT | Performed by: FAMILY MEDICINE

## 2021-12-20 PROCEDURE — 1036F TOBACCO NON-USER: CPT | Performed by: FAMILY MEDICINE

## 2021-12-20 PROCEDURE — 3725F SCREEN DEPRESSION PERFORMED: CPT | Performed by: FAMILY MEDICINE

## 2021-12-20 PROCEDURE — 93010 ELECTROCARDIOGRAM REPORT: CPT | Performed by: INTERNAL MEDICINE

## 2021-12-20 RX ORDER — NAPROXEN 500 MG/1
500 TABLET ORAL 2 TIMES DAILY WITH MEALS
Qty: 28 TABLET | Refills: 1 | Status: SHIPPED | OUTPATIENT
Start: 2021-12-20 | End: 2022-05-03 | Stop reason: ALTCHOICE

## 2021-12-20 RX ORDER — ALPRAZOLAM 0.25 MG/1
0.25 TABLET ORAL 2 TIMES DAILY PRN
Qty: 60 TABLET | Refills: 0 | Status: SHIPPED | OUTPATIENT
Start: 2021-12-20 | End: 2022-02-07 | Stop reason: SDUPTHER

## 2021-12-30 DIAGNOSIS — Z11.4 SCREENING FOR HIV (HUMAN IMMUNODEFICIENCY VIRUS): ICD-10-CM

## 2021-12-30 DIAGNOSIS — Z00.00 ROUTINE MEDICAL EXAM: Primary | ICD-10-CM

## 2021-12-30 DIAGNOSIS — Z11.59 NEED FOR HEPATITIS C SCREENING TEST: ICD-10-CM

## 2021-12-30 DIAGNOSIS — Z13.29 THYROID DISORDER SCREEN: ICD-10-CM

## 2021-12-30 DIAGNOSIS — Z13.220 LIPID SCREENING: ICD-10-CM

## 2021-12-30 DIAGNOSIS — Z13.6 SCREENING FOR HYPERTENSION: ICD-10-CM

## 2022-02-07 DIAGNOSIS — F41.9 ANXIETY: ICD-10-CM

## 2022-02-07 RX ORDER — ALPRAZOLAM 0.25 MG/1
0.25 TABLET ORAL 2 TIMES DAILY PRN
Qty: 60 TABLET | Refills: 0 | Status: SHIPPED | OUTPATIENT
Start: 2022-02-07 | End: 2022-03-28 | Stop reason: SDUPTHER

## 2022-03-28 DIAGNOSIS — F41.9 ANXIETY: ICD-10-CM

## 2022-03-28 RX ORDER — ALPRAZOLAM 0.25 MG/1
0.25 TABLET ORAL 2 TIMES DAILY PRN
Qty: 60 TABLET | Refills: 0 | Status: SHIPPED | OUTPATIENT
Start: 2022-03-28 | End: 2022-05-31 | Stop reason: SDUPTHER

## 2022-05-03 ENCOUNTER — OFFICE VISIT (OUTPATIENT)
Dept: FAMILY MEDICINE CLINIC | Facility: CLINIC | Age: 32
End: 2022-05-03
Payer: COMMERCIAL

## 2022-05-03 VITALS
OXYGEN SATURATION: 97 % | BODY MASS INDEX: 37.1 KG/M2 | HEIGHT: 71 IN | RESPIRATION RATE: 12 BRPM | SYSTOLIC BLOOD PRESSURE: 116 MMHG | HEART RATE: 88 BPM | WEIGHT: 265 LBS | DIASTOLIC BLOOD PRESSURE: 80 MMHG | TEMPERATURE: 96.3 F

## 2022-05-03 DIAGNOSIS — F11.90 CHRONIC, CONTINUOUS USE OF OPIOIDS: ICD-10-CM

## 2022-05-03 DIAGNOSIS — Z00.00 ROUTINE GENERAL MEDICAL EXAMINATION AT A HEALTH CARE FACILITY: Primary | ICD-10-CM

## 2022-05-03 DIAGNOSIS — J45.20 MILD INTERMITTENT ASTHMA WITHOUT COMPLICATION: ICD-10-CM

## 2022-05-03 DIAGNOSIS — F41.0 PANIC DISORDER WITHOUT AGORAPHOBIA: ICD-10-CM

## 2022-05-03 DIAGNOSIS — E66.09 CLASS 2 OBESITY DUE TO EXCESS CALORIES WITHOUT SERIOUS COMORBIDITY WITH BODY MASS INDEX (BMI) OF 36.0 TO 36.9 IN ADULT: ICD-10-CM

## 2022-05-03 DIAGNOSIS — F41.9 ANXIETY: ICD-10-CM

## 2022-05-03 PROBLEM — R07.81 PLEURITIC CHEST PAIN: Status: RESOLVED | Noted: 2021-12-20 | Resolved: 2022-05-03

## 2022-05-03 LAB
SL AMB  POCT GLUCOSE, UA: NORMAL
SL AMB LEUKOCYTE ESTERASE,UA: NORMAL
SL AMB POCT BILIRUBIN,UA: NORMAL
SL AMB POCT BLOOD,UA: NORMAL
SL AMB POCT CLARITY,UA: CLEAR
SL AMB POCT COLOR,UA: YELLOW
SL AMB POCT KETONES,UA: NORMAL
SL AMB POCT NITRITE,UA: NORMAL
SL AMB POCT PH,UA: 5
SL AMB POCT SPECIFIC GRAVITY,UA: 1.02
SL AMB POCT URINE PROTEIN: NORMAL
SL AMB POCT UROBILINOGEN: NORMAL

## 2022-05-03 PROCEDURE — 3008F BODY MASS INDEX DOCD: CPT | Performed by: FAMILY MEDICINE

## 2022-05-03 PROCEDURE — 1036F TOBACCO NON-USER: CPT | Performed by: FAMILY MEDICINE

## 2022-05-03 PROCEDURE — 99395 PREV VISIT EST AGE 18-39: CPT | Performed by: FAMILY MEDICINE

## 2022-05-03 PROCEDURE — 81003 URINALYSIS AUTO W/O SCOPE: CPT | Performed by: FAMILY MEDICINE

## 2022-05-03 PROCEDURE — 3725F SCREEN DEPRESSION PERFORMED: CPT | Performed by: FAMILY MEDICINE

## 2022-05-03 NOTE — PATIENT INSTRUCTIONS
Pao Coats DISCUSSED HEALTH MAINTENENCE ISSUES  BW WILL BE OBTAINED  ENCOURAGED HEALTHY DIET AND EXERCISE  COLONOSCOPY WILL BE ORDERED  RV FOR ANNUAL HEALTH EXAM IN 1 YEAR  RV SOONER IF THERE ARE ANY CONCERNS  RV 6 M FOR MED CHECK

## 2022-05-03 NOTE — PROGRESS NOTES
BMI Counseling: Body mass index is 36 96 kg/m²  The BMI is above normal  Nutrition recommendations include encouraging healthy choices of fruits and vegetables and moderation in carbohydrate intake  Exercise recommendations include exercising 3-5 times per week  No pharmacotherapy was ordered  Rationale for BMI follow-up plan is due to patient being overweight or obese  FAMILY PRACTICE HEALTH MAINTENANCE OFFICE VISIT  [facility]    NAME: Mahsa Sousa  AGE: 28 y o  SEX: male  : 1990     DATE: 5/3/2022    Assessment and Plan     Problem List Items Addressed This Visit        Respiratory    Mild intermittent asthma without complication       Other    Anxiety    Panic disorder without agoraphobia      Other Visit Diagnoses     Routine general medical examination at a health care facility    -  Primary    Relevant Orders    POCT urine dip auto non-scope (Completed)    Chronic, continuous use of opioids        Class 2 obesity due to excess calories without serious comorbidity with body mass index (BMI) of 36 0 to 36 9 in adult                   Return in about 6 months (around 11/3/2022) for Recheck  Chief Complaint     Chief Complaint   Patient presents with    Annual Exam       History of Present Illness     [facility] [address] RECORD  NO CONCERNS AT THIS TIME        Well Adult Physical   Patient here for a comprehensive physical exam       Diet and Physical Activity  Diet: well balanced diet  Weight concerns: Patient has class 2 obesity (BMI 35 0-39  9)  Exercise: occasionally      Depression Screen  PHQ-2/9 Depression Screening    Little interest or pleasure in doing things: 0 - not at all  Feeling down, depressed, or hopeless: 0 - not at all  PHQ-2 Score: 0  PHQ-2 Interpretation: Negative depression screen          General Health  Hearing: Normal:  bilateral  Vision: no vision problems  Dental: regular dental visits    Reproductive Health          The following portions of the patient's history were reviewed and updated as appropriate: allergies, current medications, past family history, past medical history, past social history, past surgical history and problem list     Review of Systems     Review of Systems   Constitutional: Negative for chills, fatigue and fever  HENT: Negative for congestion, ear discharge, ear pain, mouth sores, postnasal drip, sore throat and trouble swallowing  Eyes: Negative for pain, discharge and visual disturbance  Respiratory: Negative for cough, shortness of breath and wheezing  Cardiovascular: Negative for chest pain, palpitations and leg swelling  Gastrointestinal: Negative for abdominal distention, abdominal pain, blood in stool, diarrhea and nausea  Endocrine: Negative for polydipsia, polyphagia and polyuria  Genitourinary: Negative for dysuria, frequency, hematuria and urgency  Musculoskeletal: Negative for arthralgias, gait problem and joint swelling  Skin: Negative for pallor and rash  Neurological: Negative for dizziness, syncope, speech difficulty, weakness, light-headedness, numbness and headaches  Hematological: Negative for adenopathy  Psychiatric/Behavioral: Negative for behavioral problems, confusion and sleep disturbance  The patient is not nervous/anxious          Past Medical History     Past Medical History:   Diagnosis Date    Asthma     Carpal tunnel syndrome     Right - Last Assessed: April 4, 2016     Exercise-induced asthma     Last Assessed: September 16, 2014       Past Surgical History     Past Surgical History:   Procedure Laterality Date    HIP SURGERY         Social History     Social History     Socioeconomic History    Marital status: Single     Spouse name: None    Number of children: None    Years of education: None    Highest education level: None   Occupational History    None   Tobacco Use    Smoking status: Former Smoker    Smokeless tobacco: Former User    Tobacco comment: vaping daily since stopped smoking 3 yrs ago   Vaping Use    Vaping Use: Never used   Substance and Sexual Activity    Alcohol use: No    Drug use: No    Sexual activity: None   Other Topics Concern    None   Social History Narrative    Caffeine use     Dental Care, occasionally     No advance directives     Use of energy drinks      Social Determinants of Health     Financial Resource Strain: Not on file   Food Insecurity: Not on file   Transportation Needs: Not on file   Physical Activity: Not on file   Stress: Not on file   Social Connections: Not on file   Intimate Partner Violence: Not on file   Housing Stability: Not on file       Family History     Family History   Problem Relation Age of Onset    Other Mother         Lymphedema     Substance Abuse Family     No Known Problems Father     No Known Problems Brother     Mental illness Neg Hx        Current Medications       Current Outpatient Medications:     albuterol (PROVENTIL HFA,VENTOLIN HFA) 90 mcg/act inhaler, Inhale 2 puffs every 4 (four) hours as needed for wheezing, Disp: 8 5 g, Rfl: 1    ALPRAZolam (XANAX) 0 25 mg tablet, Take 1 tablet (0 25 mg total) by mouth 2 (two) times a day as needed for anxiety, Disp: 60 tablet, Rfl: 0    fluticasone (FLONASE) 50 mcg/act nasal spray, 1 spray into each nostril daily, Disp: , Rfl:     ZUBSOLV 5 7-1 4 MG SUBL, daily , Disp: , Rfl: 0     Allergies     Allergies   Allergen Reactions    Molds & Smuts Itching       Objective     /80 (BP Location: Left arm, Patient Position: Sitting, Cuff Size: Large)   Pulse 88   Temp (!) 96 3 °F (35 7 °C) (Temporal)   Resp 12   Ht 5' 11" (1 803 m)   Wt 120 kg (265 lb)   SpO2 97%   BMI 36 96 kg/m²      Physical Exam  Constitutional:       Appearance: He is well-developed  He is obese  HENT:      Head: Normocephalic and atraumatic        Right Ear: External ear normal       Left Ear: External ear normal       Nose: Nose normal    Eyes:      General:         Right eye: No discharge  Left eye: No discharge  Conjunctiva/sclera: Conjunctivae normal       Pupils: Pupils are equal, round, and reactive to light  Neck:      Thyroid: No thyromegaly  Vascular: No JVD  Cardiovascular:      Rate and Rhythm: Normal rate and regular rhythm  Heart sounds: Normal heart sounds  No murmur heard  Pulmonary:      Effort: Pulmonary effort is normal       Breath sounds: Normal breath sounds  No wheezing or rales  Abdominal:      General: Bowel sounds are normal       Palpations: Abdomen is soft  There is no mass  Tenderness: There is no abdominal tenderness  There is no guarding or rebound  Genitourinary:     Penis: Normal     Musculoskeletal:         General: No tenderness or deformity  Normal range of motion  Cervical back: Neck supple  Lymphadenopathy:      Cervical: No cervical adenopathy  Skin:     General: Skin is warm and dry  Findings: No erythema or rash  Neurological:      General: No focal deficit present  Mental Status: He is alert and oriented to person, place, and time  Cranial Nerves: No cranial nerve deficit  Sensory: No sensory deficit  Motor: No weakness or abnormal muscle tone  Coordination: Coordination normal       Gait: Gait normal       Deep Tendon Reflexes: Reflexes are normal and symmetric  Reflexes normal    Psychiatric:         Mood and Affect: Mood normal          Behavior: Behavior normal          Thought Content:  Thought content normal          Judgment: Judgment normal            No exam data present    Health Maintenance     Health Maintenance   Topic Date Due    Hepatitis C Screening  Never done    COVID-19 Vaccine (1) Never done    Pneumococcal Vaccine: Pediatrics (0 to 5 Years) and At-Risk Patients (6 to 59 Years) (1 of 2 - PPSV23) Never done    HIV Screening  Never done    Annual Physical  Never done   Norah Cerda DTaP,Tdap,and Td Vaccines (1 - Tdap) Never done    BMI: Followup Plan  04/30/2022    Influenza Vaccine (Season Ended) 09/01/2022    Depression Screening  05/03/2023    BMI: Adult  05/03/2023    HIB Vaccine  Aged Out    Hepatitis B Vaccine  Aged Out    IPV Vaccine  Aged Out    Hepatitis A Vaccine  Aged Out    Meningococcal ACWY Vaccine  Aged Out    HPV Vaccine  Aged Out     Immunization History   Administered Date(s) Administered    Influenza Quadrivalent, 6-35 Months IM 10/11/2017       Asaf Velazquez MD  [facility]

## 2022-05-31 ENCOUNTER — TELEPHONE (OUTPATIENT)
Dept: FAMILY MEDICINE CLINIC | Facility: CLINIC | Age: 32
End: 2022-05-31

## 2022-05-31 DIAGNOSIS — F41.9 ANXIETY: ICD-10-CM

## 2022-05-31 RX ORDER — ALPRAZOLAM 0.25 MG/1
0.25 TABLET ORAL 2 TIMES DAILY PRN
Qty: 60 TABLET | Refills: 0 | Status: SHIPPED | OUTPATIENT
Start: 2022-05-31 | End: 2022-05-31 | Stop reason: SDUPTHER

## 2022-05-31 RX ORDER — ALPRAZOLAM 0.25 MG/1
0.25 TABLET ORAL 2 TIMES DAILY PRN
Qty: 60 TABLET | Refills: 0 | Status: SHIPPED | OUTPATIENT
Start: 2022-05-31 | End: 2022-07-21 | Stop reason: SDUPTHER

## 2022-05-31 NOTE — TELEPHONE ENCOUNTER
Tyree's insurance does not pay for this rx    He stated that since he has to pay out of pocket, can you please take off the wording that says     "patients request brand name"    Can you send new script for generic      They will not take a verbal     Send to [address] in [place]

## 2022-06-01 NOTE — TELEPHONE ENCOUNTER
Spoke to [place] and informed him that Dr Rosaline Oreilly sent in a new rx  as requested   No further action required

## 2022-06-08 ENCOUNTER — OFFICE VISIT (OUTPATIENT)
Dept: FAMILY MEDICINE CLINIC | Facility: CLINIC | Age: 32
End: 2022-06-08
Payer: COMMERCIAL

## 2022-06-08 VITALS
TEMPERATURE: 97.8 F | OXYGEN SATURATION: 97 % | DIASTOLIC BLOOD PRESSURE: 76 MMHG | WEIGHT: 266 LBS | HEART RATE: 78 BPM | BODY MASS INDEX: 37.24 KG/M2 | RESPIRATION RATE: 16 BRPM | SYSTOLIC BLOOD PRESSURE: 124 MMHG | HEIGHT: 71 IN

## 2022-06-08 DIAGNOSIS — M54.42 CHRONIC MIDLINE LOW BACK PAIN WITH LEFT-SIDED SCIATICA: Primary | ICD-10-CM

## 2022-06-08 DIAGNOSIS — G89.29 CHRONIC MIDLINE LOW BACK PAIN WITH LEFT-SIDED SCIATICA: Primary | ICD-10-CM

## 2022-06-08 PROCEDURE — 1036F TOBACCO NON-USER: CPT | Performed by: FAMILY MEDICINE

## 2022-06-08 PROCEDURE — 99213 OFFICE O/P EST LOW 20 MIN: CPT | Performed by: FAMILY MEDICINE

## 2022-06-08 PROCEDURE — 3008F BODY MASS INDEX DOCD: CPT | Performed by: FAMILY MEDICINE

## 2022-06-08 RX ORDER — PREDNISONE 20 MG/1
TABLET ORAL
Qty: 14 TABLET | Refills: 0 | Status: SHIPPED | OUTPATIENT
Start: 2022-06-08

## 2022-06-08 NOTE — PATIENT INSTRUCTIONS
REST  AVOID LIFTING OR PUSHING    MEDICATION AS DIRECTED    PHYSICAL THERAPY    CALL IN 3 WEEKS WITH UPDATE, SOONER PRN

## 2022-06-08 NOTE — PROGRESS NOTES
Assessment/Plan:    No problem-specific Assessment & Plan notes found for this encounter  Diagnoses and all orders for this visit:    Chronic midline low back pain with left-sided sciatica  -     predniSONE 20 mg tablet; 2 PO QD X 4 DAYS, THEN 1 PO QD X 4 DAYS, THEN 1/2 PO QD X 4 DAYS  -     Ambulatory Referral to Physical Therapy; Future        Patient Instructions   REST  AVOID LIFTING OR PUSHING    MEDICATION AS DIRECTED    PHYSICAL THERAPY    CALL IN 3 WEEKS WITH UPDATE, SOONER PRN      Return if symptoms worsen or fail to improve, for Next scheduled follow up  Subjective:      Patient ID: Hardik Trevizo is a 28 y o  male  Chief Complaint   Patient presents with    RIGHT FOOT,LEG AND BACK PAIN        Back Pain  This is a chronic problem  The current episode started more than 1 month ago  The problem occurs constantly  The problem has been gradually worsening since onset  The pain is present in the lumbar spine  The pain radiates to the left thigh and left foot  The pain is mild  The pain is the same all the time  The symptoms are aggravated by bending, position and twisting  Associated symptoms include leg pain, numbness, paresthesias and tingling  Pertinent negatives include no abdominal pain, bladder incontinence, bowel incontinence, chest pain, dysuria, fever, headaches, paresis, pelvic pain, perianal numbness, weakness or weight loss  He has tried NSAIDs for the symptoms  The treatment provided mild relief  The following portions of the patient's history were reviewed and updated as appropriate: allergies, current medications, past family history, past medical history, past social history, past surgical history and problem list     Review of Systems   Constitutional: Negative for chills, fatigue, fever and weight loss  HENT: Negative for sore throat  Eyes: Negative for discharge  Respiratory: Negative for cough and chest tightness      Cardiovascular: Negative for chest pain and palpitations  Gastrointestinal: Negative for abdominal pain, bowel incontinence, diarrhea, nausea and vomiting  Genitourinary: Negative for bladder incontinence, dysuria and pelvic pain  Musculoskeletal: Positive for back pain  Negative for arthralgias and gait problem  Neurological: Positive for tingling, numbness and paresthesias  Negative for dizziness, weakness and headaches  Hematological: Negative for adenopathy  Psychiatric/Behavioral: The patient is not nervous/anxious  Current Outpatient Medications   Medication Sig Dispense Refill    albuterol (PROVENTIL HFA,VENTOLIN HFA) 90 mcg/act inhaler Inhale 2 puffs every 4 (four) hours as needed for wheezing 8 5 g 1    ALPRAZolam (XANAX) 0 25 mg tablet Take 1 tablet (0 25 mg total) by mouth 2 (two) times a day as needed for anxiety 60 tablet 0    fluticasone (FLONASE) 50 mcg/act nasal spray 1 spray into each nostril daily      predniSONE 20 mg tablet 2 PO QD X 4 DAYS, THEN 1 PO QD X 4 DAYS, THEN 1/2 PO QD X 4 DAYS 14 tablet 0    ZUBSOLV 5 7-1 4 MG SUBL daily   0     No current facility-administered medications for this visit  Objective:    /76   Pulse 78   Temp 97 8 °F (36 6 °C) (Temporal)   Resp 16   Ht 5' 11" (1 803 m)   Wt 121 kg (266 lb)   SpO2 97%   BMI 37 10 kg/m²        Physical Exam  Constitutional:       Appearance: He is well-developed  HENT:      Head: Normocephalic and atraumatic  Eyes:      General:         Right eye: No discharge  Left eye: No discharge  Conjunctiva/sclera: Conjunctivae normal       Pupils: Pupils are equal, round, and reactive to light  Neck:      Thyroid: No thyromegaly  Vascular: No JVD  Cardiovascular:      Rate and Rhythm: Normal rate and regular rhythm  Heart sounds: Normal heart sounds  No murmur heard  Pulmonary:      Effort: Pulmonary effort is normal       Breath sounds: Normal breath sounds  No wheezing or rales     Abdominal:      General: Bowel sounds are normal       Palpations: Abdomen is soft  There is no mass  Tenderness: There is no abdominal tenderness  There is no guarding or rebound  Musculoskeletal:         General: Tenderness present  No deformity  Cervical back: Neck supple  Comments: MINIMAL LOWER LUMBAR TENDERNESS  L SCIATIC NOTCH TENDERNESS    NEG SLR  REFLEXES PRESERVED   Lymphadenopathy:      Cervical: No cervical adenopathy  Skin:     General: Skin is warm and dry  Findings: No erythema or rash  Neurological:      Mental Status: He is alert and oriented to person, place, and time  Psychiatric:         Behavior: Behavior normal          Thought Content:  Thought content normal          Judgment: Judgment normal                 Jose David Mchugh MD

## 2022-06-13 ENCOUNTER — EVALUATION (OUTPATIENT)
Dept: PHYSICAL THERAPY | Facility: CLINIC | Age: 32
End: 2022-06-13
Payer: COMMERCIAL

## 2022-06-13 DIAGNOSIS — G89.29 CHRONIC MIDLINE LOW BACK PAIN WITH LEFT-SIDED SCIATICA: ICD-10-CM

## 2022-06-13 DIAGNOSIS — M54.42 CHRONIC MIDLINE LOW BACK PAIN WITH LEFT-SIDED SCIATICA: ICD-10-CM

## 2022-06-13 DIAGNOSIS — M51.16 LUMBAR DISC HERNIATION WITH RADICULOPATHY: Primary | ICD-10-CM

## 2022-06-13 PROCEDURE — 97110 THERAPEUTIC EXERCISES: CPT | Performed by: PHYSICAL THERAPIST

## 2022-06-13 PROCEDURE — 97162 PT EVAL MOD COMPLEX 30 MIN: CPT | Performed by: PHYSICAL THERAPIST

## 2022-06-13 NOTE — PROGRESS NOTES
PT Evaluation     Today's date: 2022  Patient name: Melvin Rachel  : 1990  MRN: [id number]  Referring provider: Michelle Dickey MD  Dx:   Encounter Diagnosis     ICD-10-CM    1  Lumbar disc herniation with radiculopathy  M51 16    2  Chronic midline low back pain with left-sided sciatica  M54 42 Ambulatory Referral to Physical Therapy    G89 29                   Assessment  Assessment details: Melvin Rachel is a 28 y o  male who presents with lumbar and bilateral LE pain, decreased strength intermittently in LEs, decreased spinal ROM, ambulatory dysfunction, postural  dysfunction and numbness  Due to these impairments, patient has difficulty performing ADL's, prolonged sitting, functional mobility, work-related activities, lifting/carrying  During evaluation his symptoms were easily peripheralized with repeated flexion in standing creating increased LE symptoms  His symptoms were then reduced in LEs with repeated extension demonstrating extension movement preference  Patient's clinical presentation is consistent with lumbar disc derangement with intermittent LE weakness  Patient has been educated in postural education, home exercise program and plan of care   Patient would benefit from skilled physical therapy services to address their aforementioned functional limitations and progress towards prior level of function and independence with home exercise program         Impairments: abnormal gait, abnormal muscle tone, abnormal or restricted ROM, activity intolerance, impaired physical strength, lacks appropriate home exercise program, pain with function, poor posture  and poor body mechanics  Understanding of Dx/Px/POC: good   Prognosis: good    Goals  Short term goals to be accomplished in 2-4weeks:  STG 1: Pt will demo independence with postural management  STG 2: Pt will demo I with HEP to maximize progress between therapy sessions  STG 3: Pt will reports pain dec freq and intensity 50%  STG 4: Pt will deny sleep disturbance     Long term goals to be accomplished in 4-8 weeks:  LTG 1: Pt will demo good body mech with >75% functional challenges to prevent reinjury  LTG 2: Pt will be able to return to sitting activity for >1 hour pain free as per PLOF  LTG 3: Pt will be able to lift >40 lbs without pain  Plan  Plan details: HEP development, stretching, strengthening, A/AA/PROM, joint mobilizations, posture education, body mechanics training for bending and lifting, STM/MI as needed to reduce muscle tension, balance and proprioceptive training, muscle reeducation, PLOC discussed and agreed upon with patient  Patient would benefit from: PT eval and skilled physical therapy  Planned modality interventions: cryotherapy and thermotherapy: hydrocollator packs  Planned therapy interventions: manual therapy, neuromuscular re-education, self care, therapeutic activities, therapeutic exercise, home exercise program, body mechanics training, patient education, postural training, strengthening and stretching  Frequency: 2x week  Duration in weeks: 6  Treatment plan discussed with: patient        Subjective Evaluation    History of Present Illness  Mechanism of injury: Terrell Ling reports intermittent bilateral cramping in LEs, weakness, numbness and pain  He also experiences tense sensation in low back and glutes that can turn into pain with prolonged sitting  He states these symptoms began >1 month ago  He was seen by his PCP who prescribed oral steroid dose  Terrell Ling states he has been going to a chiropractor with some relief of lumbar/glute pain but has continues possibly worse LE symptoms  He did experience one episode of waking up with dizziness and heart racing        Pain Location: Central lumbar spine, bilateral radicular pain into posterior thigh, lower limb and feet but normally only occurring unilaterally  Pain Type: Pain, numb, cramping, weakness  Worst: 6/10  Best: 2/10  Current: 3/10     Aggravating:  Sitting prolonged, waking up in morning with symptoms, intermittently with walking, lifting  As per  FOTO pt reports:  "Limited a little" with:     - Walking a mile    - Moderate activities, like moving a table, pushing a vacuum , bowling, or playing golf      - Walking several blocks   "Moderate Difficulty" with:     - Performing heavy activities around your home    - With any of your usual work, housework, or school activities   Social Support  Steps to enter house: yes  Stairs in house: yes   Lives in: multiple-level home  Lives with: significant other    Employment status: working ( - Industrial construction)        Objective     Postural Observations  Seated posture: poor  Standing posture: poor  Correction of posture: makes symptoms better        Neurological Testing     Sensation     Lumbar   Left   Intact: light touch    Right   Intact: light touch    Active Range of Motion     Lumbar   Flexion:  WFL and with pain  Extension:  Restriction level: moderate    Additional Active Range of Motion Details  Lumbar side glide - WNL bilaterally  Mechanical Assessment    Cervical      Thoracic      Lumbar    Standing flexion: repeated movements   Pain location:peripheralized  Pain intensity: worse  Standing extension: repeated movements  Pain location: centralized  Pain intensity: better  Lying extension: repeated movements  Pain location: no change    Strength/Myotome Testing     Lumbar   Left   Heel walk: abnormal (Able to complete but c/o "tightness/weakness")    Right   Heel walk: abnormal (  able to complete but sensation of "tightness/weak")    Left Hip   Planes of Motion   Flexion: 5    Right Hip   Planes of Motion   Flexion: 5    Left Knee   Flexion: 5  Extension: 5    Right Knee   Flexion: 5  Extension: 5    Left Ankle/Foot   Dorsiflexion: 5  Plantar flexion: 5    Right Ankle/Foot   Dorsiflexion: 5  Plantar flexion: 5      Flowsheet Rows    Flowsheet Row Most Recent Value   PT/OT G-Codes    Current Score 58   Projected Score 72             Precautions: History of anxiety, heart arrythmia    HEP KARRIE + REIL 10x/hr until next session  IE completed 6/13  Manuals                         Neuro Re-Ed                                        Ther Ex                                             Ther Activity               Gait Training               Modalities

## 2022-06-15 ENCOUNTER — OFFICE VISIT (OUTPATIENT)
Dept: PHYSICAL THERAPY | Facility: CLINIC | Age: 32
End: 2022-06-15
Payer: COMMERCIAL

## 2022-06-15 DIAGNOSIS — M51.16 LUMBAR DISC HERNIATION WITH RADICULOPATHY: Primary | ICD-10-CM

## 2022-06-15 DIAGNOSIS — G89.29 CHRONIC MIDLINE LOW BACK PAIN WITH LEFT-SIDED SCIATICA: ICD-10-CM

## 2022-06-15 DIAGNOSIS — M54.42 CHRONIC MIDLINE LOW BACK PAIN WITH LEFT-SIDED SCIATICA: ICD-10-CM

## 2022-06-15 PROCEDURE — 97112 NEUROMUSCULAR REEDUCATION: CPT | Performed by: PHYSICAL THERAPIST

## 2022-06-15 PROCEDURE — 97110 THERAPEUTIC EXERCISES: CPT | Performed by: PHYSICAL THERAPIST

## 2022-06-16 NOTE — PROGRESS NOTES
Daily Note     Today's date: 6/15/2022  Patient name: Myron Osler  : 1990  MRN: [id number]  Referring provider: Rachael Sewell MD  Dx:   Encounter Diagnosis     ICD-10-CM    1  Lumbar disc herniation with radiculopathy  M51 16    2  Chronic midline low back pain with left-sided sciatica  M54 42     G89 29                   Subjective: Myron Osler presents stating he has not felt any significant difference in symptoms following beginning repeated extension movements  He later stated that the night of his IE he completed many EIL exercises and did wake up next morning with less "tightness/weakness" in calves compared to other days  Yesterday he completed a lot of his EIS but not as many EIL and reported no change compared to normal   He dd report less pain while driving while using towel roll  He continues with constant right foot symptoms that only slightly fluctuate  Objective: See treatment diary below      Assessment: Tolerated treatment fair  Patient responded well to EIL w/pt OP with reduced lower limb numbness/tightness following  His loer limb numbness and low back pain would abolish in prone but return slightly in [address]  He did demo temporary left LE symptoms provoked once during EIL w/PT OP and also during EIL with hips off center to left  His symptoms were easily peripheralized and worsened with RFIL  He continues with signs of lumbar disc derangement  Plan: Continue per plan of care        Precautions: History of anxiety, heart arrythmia    HEP KARRIE + REIL 10x/hr until next session  IE completed   Manuals 6/15 6/13    2    EIL w/PT op 10x provoked left LE    LS ext mobs 10x NE         Neuro Re-Ed     STS training PT    Seated hip hinge training PT                             Ther Ex     FIS 3x10 slight increase in LE    CHILO 2x10 W in LE    EIS 10 no change    EIL pt OP 3x10 decreased LE better    EIL with hips off center to left 10x P to left LE

## 2022-06-27 ENCOUNTER — OFFICE VISIT (OUTPATIENT)
Dept: PHYSICAL THERAPY | Facility: CLINIC | Age: 32
End: 2022-06-27
Payer: COMMERCIAL

## 2022-06-27 DIAGNOSIS — M54.42 CHRONIC MIDLINE LOW BACK PAIN WITH LEFT-SIDED SCIATICA: ICD-10-CM

## 2022-06-27 DIAGNOSIS — G89.29 CHRONIC MIDLINE LOW BACK PAIN WITH LEFT-SIDED SCIATICA: ICD-10-CM

## 2022-06-27 DIAGNOSIS — M51.16 LUMBAR DISC HERNIATION WITH RADICULOPATHY: Primary | ICD-10-CM

## 2022-06-27 PROCEDURE — 97110 THERAPEUTIC EXERCISES: CPT | Performed by: PHYSICAL THERAPIST

## 2022-06-27 PROCEDURE — 97112 NEUROMUSCULAR REEDUCATION: CPT | Performed by: PHYSICAL THERAPIST

## 2022-06-27 NOTE — PROGRESS NOTES
Daily Note     Today's date: 2022  Patient name: Mahsa Sousa  : 1990  MRN: [id number]  Referring provider: Ruthy Paz MD  Dx:   Encounter Diagnosis     ICD-10-CM    1  Lumbar disc herniation with radiculopathy  M51 16    2  Chronic midline low back pain with left-sided sciatica  M54 42     G89 29                   Subjective: Mahsa Sousa states his right foot symptoms fluctuate day to day  Overall he has been experiencing less "tightness" in lower legs  His low back continues with soreness following laying supine for extended periods  He states he did pretty well on vacation riding jet ski and fishing but more uncomfortable with static sitting/driving/computer work  Objective: See treatment diary below      Assessment: Tolerated treatment well  Patient demonstrated right sciatic nerve tension which provoked right foot symptoms and bilateral calf tightness following session  Symptoms were unchanged with repeated extension movements of spine  He demos right>left hip tightness globally leading to increased stress through lumbar spine  Plan: Continue per plan of care  Precautions: History of anxiety, heart arrythmia    Access Code: [email or web address]  URL: [email or web address]  Date: 2022  Prepared by: Glendy Yoo    Exercises  Supine Sciatic Nerve Glide - 1-2 x daily - 7 x weekly - 1 sets - 10 reps  Modified Tor Stretch - 2 x daily - 7 x weekly - 3 sets - 30 hold  Supine Figure 4 Piriformis Stretch - 2 x daily - 7 x weekly - 3 sets - 30 hold  Supine Bridge - 1 x daily - 3 x weekly - 2 sets - 10 reps - 5 hold      HEP KARRIE + REIL 10x/hr until next session  IE completed   Manuals 6/27 6/15 6/13    3 2    EIL w/PT op  10x provoked left LE    LS ext mobs  10x NE          Neuro Re-Ed      STS training  PT    Seated hip hinge training  PT    Bridge  2x10 5s     Supine Sciatic tension 10x w/ PT  10x Indpen                       Ther Ex FIS  3x10 slight increase in LE    CHILO  2x10 W in LE    EIS  10 no change    EIL pt OP 20x NE 3x10 decreased LE better    EIL with hips off center to left  10x P to left LE    Modified Tor str 3x30s B     Hook Dodie str 3x30s B

## 2022-06-29 ENCOUNTER — APPOINTMENT (OUTPATIENT)
Dept: PHYSICAL THERAPY | Facility: CLINIC | Age: 32
End: 2022-06-29
Payer: COMMERCIAL

## 2022-07-06 ENCOUNTER — OFFICE VISIT (OUTPATIENT)
Dept: PHYSICAL THERAPY | Facility: CLINIC | Age: 32
End: 2022-07-06
Payer: COMMERCIAL

## 2022-07-06 DIAGNOSIS — G89.29 CHRONIC MIDLINE LOW BACK PAIN WITH LEFT-SIDED SCIATICA: ICD-10-CM

## 2022-07-06 DIAGNOSIS — M51.16 LUMBAR DISC HERNIATION WITH RADICULOPATHY: Primary | ICD-10-CM

## 2022-07-06 DIAGNOSIS — M54.42 CHRONIC MIDLINE LOW BACK PAIN WITH LEFT-SIDED SCIATICA: ICD-10-CM

## 2022-07-06 PROCEDURE — 97112 NEUROMUSCULAR REEDUCATION: CPT | Performed by: PHYSICAL THERAPIST

## 2022-07-06 PROCEDURE — 97110 THERAPEUTIC EXERCISES: CPT | Performed by: PHYSICAL THERAPIST

## 2022-07-06 NOTE — PROGRESS NOTES
Daily Note     Today's date: 2022  Patient name: John Mike  : 1990  MRN: [id number]  Referring provider: Nora Duarte MD  Dx:   Encounter Diagnosis     ICD-10-CM    1  Lumbar disc herniation with radiculopathy  M51 16    2  Chronic midline low back pain with left-sided sciatica  M54 42     G89 29                   Subjective: John Mike presents to therapy today stating he has noticed increased numbness/pain in right foot overall in past week  He does report decreased sensation of tightness in calves bilaterally  He states he held off hip flexor stretches as he had some groin pain after a day or so of completing them  He also reports an onset of right patellar pain impacting squatting, negotiation of stairs and transfers  Objective: See treatment diary below  Pt with decreased inferior right patellar mobility and TTP along medial patella and quad tendon  Assessment: Tolerated treatment fair  Patient's right knee pain decreased following medial glide of patella manually and then responded well to medial patella glide taping  His right knee pain is consistent of patellofemoral syndrome  This however may be indirectly related to concurrent lumbar radiculopathy  He reported increased peripheral symptoms in right foot indicating possible further nerve root compression in lumbar spine  These symptoms are unable to be acutely provoked or reduced with any movements  His tight sensation in bilateral calves and his low back pain has decreased slightly at this time  He may benefit from orthopedic consult and diagnostic imaging at this time  Plan: Continue per plan of care  Focus on continuing to centralize radicular symptoms while setting up orthopedic consult if symptoms do not begin to improve  Precautions: History of anxiety, heart arrythmia    Access Code: [email or web address]  URL: [email or web address]  Date: 2022  Prepared by: Sally Navas Torcivia    Exercises  Supine Sciatic Nerve Glide - 1-2 x daily - 7 x weekly - 1 sets - 10 reps  Modified Tor Stretch - 2 x daily - 7 x weekly - 3 sets - 30 hold  Supine Figure 4 Piriformis Stretch - 2 x daily - 7 x weekly - 3 sets - 30 hold  Supine Bridge - 1 x daily - 3 x weekly - 2 sets - 10 reps - 5 hold      HEP KARRIE + REIL 10x/hr until next session  IE completed 6/13  Manuals 7/6 6/27 6/15 6/13    4 3 2    EIL w/PT op   10x provoked left LE    LS ext mobs   10x NE    Medial patella tape w/leuko+coverall PT      Neuro Re-Ed       STS training   PT    Seated hip hinge training   PT    Bridge   2x10 5s     Supine Sciatic tension 20x  10x w/ PT  10x Indpen     Hook PPT 15x5s                    Ther Ex       FIS   3x10 slight increase in LE    CHILO   2x10 W in LE    EIS   10 no change    EIL pt OP 15x BE 20x NE 3x10 decreased LE better    EIL with hips off center to left   10x P to left LE    Modified Tor str Standing 30s B 3x30s B     Hook Dodie str  3x30s B     Hook LS rot 15x provoked right low back pain

## 2022-07-14 ENCOUNTER — APPOINTMENT (OUTPATIENT)
Dept: PHYSICAL THERAPY | Facility: CLINIC | Age: 32
End: 2022-07-14
Payer: COMMERCIAL

## 2022-07-21 DIAGNOSIS — F41.9 ANXIETY: ICD-10-CM

## 2022-07-21 RX ORDER — ALPRAZOLAM 0.25 MG/1
0.25 TABLET ORAL 2 TIMES DAILY PRN
Qty: 60 TABLET | Refills: 0 | Status: SHIPPED | OUTPATIENT
Start: 2022-07-21 | End: 2022-09-13 | Stop reason: SDUPTHER

## 2022-07-29 ENCOUNTER — APPOINTMENT (OUTPATIENT)
Dept: RADIOLOGY | Facility: CLINIC | Age: 32
End: 2022-07-29
Payer: COMMERCIAL

## 2022-07-29 ENCOUNTER — OFFICE VISIT (OUTPATIENT)
Dept: OBGYN CLINIC | Facility: CLINIC | Age: 32
End: 2022-07-29
Payer: COMMERCIAL

## 2022-07-29 VITALS
HEIGHT: 71 IN | SYSTOLIC BLOOD PRESSURE: 120 MMHG | DIASTOLIC BLOOD PRESSURE: 76 MMHG | WEIGHT: 266 LBS | BODY MASS INDEX: 37.24 KG/M2 | HEART RATE: 80 BPM

## 2022-07-29 DIAGNOSIS — M54.16 LUMBAR RADICULOPATHY: ICD-10-CM

## 2022-07-29 DIAGNOSIS — M54.16 LUMBAR RADICULOPATHY: Primary | ICD-10-CM

## 2022-07-29 PROCEDURE — 99204 OFFICE O/P NEW MOD 45 MIN: CPT | Performed by: ORTHOPAEDIC SURGERY

## 2022-07-29 PROCEDURE — 72100 X-RAY EXAM L-S SPINE 2/3 VWS: CPT

## 2022-07-29 RX ORDER — NAPROXEN 500 MG/1
500 TABLET ORAL 2 TIMES DAILY WITH MEALS
Qty: 60 TABLET | Refills: 0 | Status: SHIPPED | OUTPATIENT
Start: 2022-07-29 | End: 2022-09-22
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
## 2022-08-10 ENCOUNTER — EVALUATION (OUTPATIENT)
Dept: PHYSICAL THERAPY | Facility: CLINIC | Age: 32
End: 2022-08-10
Payer: COMMERCIAL

## 2022-08-10 DIAGNOSIS — M51.16 LUMBAR DISC HERNIATION WITH RADICULOPATHY: Primary | ICD-10-CM

## 2022-08-10 DIAGNOSIS — M76.821 POSTERIOR TIBIAL TENDINITIS OF RIGHT LOWER EXTREMITY: ICD-10-CM

## 2022-08-10 PROCEDURE — 97112 NEUROMUSCULAR REEDUCATION: CPT | Performed by: PHYSICAL THERAPIST

## 2022-08-10 PROCEDURE — 97110 THERAPEUTIC EXERCISES: CPT | Performed by: PHYSICAL THERAPIST

## 2022-08-10 NOTE — PROGRESS NOTES
Re-Eval    Today's date: 8/10/2022  Patient name: Kalyan Colbert  : 1990  MRN: [id number]  Referring provider: Rojelio Tracy  Dx:   Encounter Diagnosis     ICD-10-CM    1  Lumbar disc herniation with radiculopathy  M51 16    2  Posterior tibial tendinitis of right lower extremity  M76 821                 Assessment  Assessment details: Kalyan Colbert was seen for a re-evaluation after a 3 week lapse in treatment for lumbar radiculopathy  Since his last visit he was seen by orthopedics who prescribed an oral steroid and to continue OPPT prior to possible MRI if symptoms do not improve  He continues with c/o central low back pain, bilateral "tightness" in calves and right medial ankle pain  He demonstrates centralization and abolished calf tightness and central low back pain with repeated extension movements of his lumbar spine indicating lumbar disc derangement  His right medial ankle pain demonstrates symptoms consistent of posterior tibialis tendonitis with TTP along distal tendon, ankle inversion weakness, pain with passive and active tension along tendon  His right medial ankle symptoms improve following AROM and gentle stretching while are unchanged with NWB repeated movement of his lumbar spine demonstrating his ankle and lumbar spine are two indirectly related issues  Due to the above impairments he has difficulty and provocation of symptoms with prolonged sitting, driving, transfers in and out of his truck, rising from prolonged sitting, difficulty completing job related tasks and recreational activities  Patient will continue to benefit from skilled physical therapy to continually address the remaining strength, ROM, proprioceptive deficits to be able to return to iAD[employer]s, work, recreational activities at [employer]       Impairments: abnormal gait, abnormal muscle tone, abnormal or restricted ROM, activity intolerance, impaired physical strength, lacks appropriate home exercise program, pain with function, poor posture  and poor body mechanics  Understanding of Dx/Px/POC: good   Prognosis: good    Goals  Short term goals to be accomplished in 2-4weeks:  STG 1: Pt will demo independence with postural management - Met  STG 2: Pt will demo I with HEP to maximize progress between therapy sessions - Met  STG 3: Pt will reports pain dec freq and intensity 50% - In Progress   STG 4: Pt will deny sleep disturbance - Met    Long term goals to be accomplished in 4-8 weeks: In Progress   LTG 1: Pt will demo good body mech with >75% functional challenges to prevent reinjury  LTG 2: Pt will be able to return to sitting activity for >1 hour pain free as per PLOF  LTG 3: Pt will be able to lift >40 lbs without pain  Plan  Plan details: HEP development, stretching, strengthening, A/AA/PROM, joint mobilizations, posture education, body mechanics training for bending and lifting, STM/MI as needed to reduce muscle tension, balance and proprioceptive training, muscle reeducation, PLOC discussed and agreed upon with patient  Patient would benefit from: PT eval and skilled physical therapy  Planned modality interventions: cryotherapy and thermotherapy: hydrocollator packs  Planned therapy interventions: manual therapy, neuromuscular re-education, self care, therapeutic activities, therapeutic exercise, home exercise program, body mechanics training, patient education, postural training, strengthening and stretching  Frequency: 2x week  Duration in weeks: 6  Treatment plan discussed with: patient      Subjective: He reports continued intermittent tightness in bilateral calves when sitting prolonged  Decreased difficulty sleeping since he began completing REIL prior to going to sleep  Sitting prolonged at night after long day increased LBP, tightness in calves and medial ankle pain  He reports right medial ankle pain following prolonged sitting  Pain Location: Central low back and medial right ankle  Pain Type: Dull ache/soreness  Worst: 6 5/10  Best: 0/10  Current: 4/10     Objective: See treatment diary below    Postural Observations  Seated posture: fair  Standing posture: fair  Correction of posture: makes symptoms better        Neurological Testing     Sensation     Lumbar   Left   Intact: light touch    Right   Intact: light touch    Active Range of Motion     Lumbar   Flexion:  WFL and with pain  Extension:  Restriction level: moderate    Additional Active Range of Motion Details  Lumbar side glide - WNL bilaterally        Mechanical Assessment    Lumbar    Standing extension: repeated movements - decreased bilateral calf tightness and slight decrease of medial ankle pain  Prone extension in lying: repeated movements - abolished LBP + no change to medial ankle pain  Standing soleus stretch: increased medial ankle pain initially then reduced    Palpation:  Patient presents with TTP along distal posterior tibialis tendon of right LE  Pain also provoked with passive lengthening of same structure  Strength/Myotome Testing     Left Hip   Planes of Motion   Flexion: 5    Right Hip   Planes of Motion   Flexion: 5    Left Knee   Flexion: 5  Extension: 5    Right Knee   Flexion: 5  Extension: 5    Left Ankle/Foot   Dorsiflexion: 5  Plantar flexion: 5  Inversion: 5    Right Ankle/Foot   Dorsiflexion: 5  Plantar flexion: 5  Inversion: 4 w/medial ankle pain    SL Heel raises -  Left 19  Right 18           Precautions: History of anxiety, heart arrythmia    Access Code: [email or web address]  URL: [email or web address]  Date: 08/10/2022  Prepared by: Jay Avila 8/10  Manuals 8/10 7/6 6/27 6/15 6/13    5 4 3 2    EIL w/PT op    10x provoked left LE    LS ext mobs    10x NE    Medial patella tape w/leuko+coverall  PT      Neuro Re-Ed        STS training    PT    Seated hip hinge training    PT    Bridge    2x10 5s     Supine Sciatic tension  20x  10x w/ PT  10x Indpen     Hook PPT  15x5s Exaggerated heel to toe push through 20x       Clinic amb Focus on toe off 3x60ft       Ther Ex        FIS    3x10 slight increase in LE    CHILO    2x10 W in LE    EIS 2x10 decreased LBP and LE tightness    10 no change    EIL pt OP 2x10 no change in right ankle pain 15x BE 20x NE 3x10 decreased LE better    EIL with hips off center to left    10x P to left LE    Modified Tor str  Standing 30s B 3x30s B     Hook Dodie str   3x30s B     Hook LS rot  15x provoked right low back pain      Ankle inv/ev 20x (provokes medial ankle pain)       Soleus str 3x30s       Gastroc str 3x30s

## 2022-08-31 ENCOUNTER — OFFICE VISIT (OUTPATIENT)
Dept: PHYSICAL THERAPY | Facility: CLINIC | Age: 32
End: 2022-08-31
Payer: COMMERCIAL

## 2022-08-31 DIAGNOSIS — M51.16 LUMBAR DISC HERNIATION WITH RADICULOPATHY: Primary | ICD-10-CM

## 2022-08-31 DIAGNOSIS — M76.821 POSTERIOR TIBIAL TENDINITIS OF RIGHT LOWER EXTREMITY: ICD-10-CM

## 2022-08-31 PROCEDURE — 97112 NEUROMUSCULAR REEDUCATION: CPT | Performed by: PHYSICAL THERAPIST

## 2022-08-31 PROCEDURE — 97110 THERAPEUTIC EXERCISES: CPT | Performed by: PHYSICAL THERAPIST

## 2022-08-31 NOTE — PROGRESS NOTES
Daily Note     Today's date: 2022  Patient name: Shawna Hall  : 1990  MRN: [id number]  Referring provider: Frederick Nunes MD  Dx:   Encounter Diagnosis     ICD-10-CM    1  Lumbar disc herniation with radiculopathy  M51 16    2  Posterior tibial tendinitis of right lower extremity  M76 821                   Subjective: Shawna Hall reports he has been feeling overall improvement with pain less often and intense in right foot  He does notice increased pain when sitting improper or lifting heavy objects with poor mechanics  He does continue with intermittent tightness in calves R>L especially with prolonged sitting  Objective: See treatment diary below      Assessment: Tolerated treatment well  Patient demos bilateral gastroc/solues tightness and weakness which may be contirbuting to bilateral calf symptoms  He also reported his work station at home has his heels slightly off ground and he can sustain that position for 2-4hrs at a time which may be leading to prolonged contraction of soleus and creating hypertonicity  Plan: Continue per plan of care  Precautions: History of anxiety, heart arrythmia    Access Code: [email or web address]  URL: [email or web address]  Date: 08/10/2022  Prepared by: Paulo Avila 8/10  Manuals 8/31 8/10 7/6 6/27 6/15    6 5 4 3 2   EIL w/PT op     10x provoked left LE   LS ext mobs     10x NE   Medial patella tape w/leuko+coverall   PT     Neuro Re-Ed        STS training     PT   Seated hip hinge training     PT   Bridge     2x10 5s    Supine Sciatic tension   20x  10x w/ PT  10x Indpen    Hook PPT   15x5s     Side step on toes 5 laps at mirror       Exaggerated heel to toe push through  20x      Clinic amb  Focus on toe off 3x60ft      Ther Ex        FIS     3x10 slight increase in LE   CHILO     2x10 W in LE   EIS Wall OP 2x10 2x10 decreased LBP and LE tightness    10 no change   EIL pt OP  2x10 no change in right ankle pain 15x BE 20x NE 3x10 decreased LE better   EIL with hips off center to left     10x P to left LE   Modified Tor str   Standing 35s B 3x30s B    Hook Dodie str    3x30s B    Hook LS rot   15x provoked right low back pain     Ankle inv/ev  20x (provokes medial ankle pain)      B heel raise in mini squat 3x10       B toe raises 3x10       Education Sitting posture, chair ergonomics for home workspace       Soleus str 3x30s B 3x30s      Gastroc str 3x30s B 3x30s

## 2022-09-11 LAB — EXT SARS-COV-2: NEGATIVE

## 2022-09-13 DIAGNOSIS — F41.9 ANXIETY: ICD-10-CM

## 2022-09-13 RX ORDER — ALPRAZOLAM 0.25 MG/1
0.25 TABLET ORAL 2 TIMES DAILY PRN
Qty: 60 TABLET | Refills: 0 | Status: SHIPPED | OUTPATIENT
Start: 2022-09-13

## 2022-09-22 ENCOUNTER — OFFICE VISIT (OUTPATIENT)
Dept: FAMILY MEDICINE CLINIC | Facility: CLINIC | Age: 32
End: 2022-09-22
Payer: COMMERCIAL

## 2022-09-22 VITALS
TEMPERATURE: 98 F | HEART RATE: 80 BPM | HEIGHT: 71 IN | DIASTOLIC BLOOD PRESSURE: 82 MMHG | BODY MASS INDEX: 37.66 KG/M2 | SYSTOLIC BLOOD PRESSURE: 122 MMHG | WEIGHT: 269 LBS | OXYGEN SATURATION: 98 % | RESPIRATION RATE: 12 BRPM

## 2022-09-22 DIAGNOSIS — R53.83 FATIGUE, UNSPECIFIED TYPE: ICD-10-CM

## 2022-09-22 DIAGNOSIS — F41.0 PANIC DISORDER WITHOUT AGORAPHOBIA: ICD-10-CM

## 2022-09-22 DIAGNOSIS — R00.2 PALPITATIONS: Primary | ICD-10-CM

## 2022-09-22 DIAGNOSIS — R07.9 CHEST PAIN, UNSPECIFIED TYPE: ICD-10-CM

## 2022-09-22 PROCEDURE — 36415 COLL VENOUS BLD VENIPUNCTURE: CPT | Performed by: FAMILY MEDICINE

## 2022-09-22 PROCEDURE — 99213 OFFICE O/P EST LOW 20 MIN: CPT | Performed by: FAMILY MEDICINE

## 2022-09-22 RX ORDER — AMOXICILLIN AND CLAVULANATE POTASSIUM 875; 125 MG/1; MG/1
1 TABLET, FILM COATED ORAL EVERY 12 HOURS
COMMUNITY
Start: 2022-06-16 | End: 2022-09-22 | Stop reason: ALTCHOICE

## 2022-09-22 RX ORDER — PREDNISONE 10 MG/1
TABLET ORAL
COMMUNITY
Start: 2022-06-16 | End: 2022-09-22 | Stop reason: ALTCHOICE

## 2022-09-22 NOTE — PROGRESS NOTES
Name: Em Liu      : 1990      MRN: [id number]  Encounter Provider: Brenda Braun MD  Encounter Date: 2022   Encounter department: [facility]     1  Palpitations  -     TSH, 3rd generation  -     CBC and differential  -     Comprehensive metabolic panel  -     Lyme Total Antibody Profile with reflex to WB    2  Chest pain, unspecified type    3  Fatigue, unspecified type  -     TSH, 3rd generation  -     CBC and differential  -     Comprehensive metabolic panel  -     C-reactive protein  -     Sedimentation rate, automated  -     Lyme Total Antibody Profile with reflex to WB    4  Panic disorder without agoraphobia         Subjective      PATIENT IS S/P ER EVAL FOR PALPITATIONS AND ANXIETY    PATIENT STATES HE BEGAN TO HAVE RAPID HR SEVERAL DAYS AGO  IT WAS ASSOCIATED WITH SOME MILD SHARP CHEST DISCOMFORT ON L SIDE  ANXIETY AND PANIC FEELINGS QUICKLY CAME    PATIENT WAS EVALUATED IN ER - WORKUP NEGATIVE   RECORDS WERE REVIEWED    PATIENT FEELS BETTER BUT NOT RIGHT  STILL ANXIOUS  STILL HAVING OCCASIONAL SHARP L SIDED CP  LASTS SECONDS    DENIES SOB, DIZZINESS  NO NVD  NO CHANGE IN STOOLS  ? MORE STRESS    DISCUSSED THERAPEUTIC PLAN    Review of Systems   Constitutional: Negative for chills, fatigue and fever  HENT: Negative for congestion, ear discharge, ear pain, mouth sores, postnasal drip, sore throat and trouble swallowing  Eyes: Negative for pain, discharge and visual disturbance  Respiratory: Negative for cough, shortness of breath and wheezing  Cardiovascular: Positive for chest pain and palpitations  Negative for leg swelling  Gastrointestinal: Negative for abdominal distention, abdominal pain, blood in stool, diarrhea and nausea  Endocrine: Negative for polydipsia, polyphagia and polyuria  Genitourinary: Negative for dysuria, frequency, hematuria and urgency     Musculoskeletal: Negative for arthralgias, gait problem and joint swelling  Skin: Negative for pallor and rash  Neurological: Negative for dizziness, syncope, speech difficulty, weakness, light-headedness, numbness and headaches  Hematological: Negative for adenopathy  Psychiatric/Behavioral: Negative for behavioral problems, confusion and sleep disturbance  The patient is nervous/anxious  Current Outpatient Medications on File Prior to Visit   Medication Sig    albuterol (PROVENTIL HFA,VENTOLIN HFA) 90 mcg/act inhaler Inhale 2 puffs every 4 (four) hours as needed for wheezing    ALPRAZolam (XANAX) 0 25 mg tablet Take 1 tablet (0 25 mg total) by mouth 2 (two) times a day as needed for anxiety    fluticasone (FLONASE) 50 mcg/act nasal spray 1 spray into each nostril daily    ZUBSOLV 5 7-1 4 MG SUBL daily     [DISCONTINUED] naproxen (NAPROSYN) 500 mg tablet Take 1 tablet (500 mg total) by mouth 2 (two) times a day with meals (Patient not taking: Reported on 9/22/2022)    [DISCONTINUED] predniSONE 20 mg tablet 2 PO QD X 4 DAYS, THEN 1 PO QD X 4 DAYS, THEN 1/2 PO QD X 4 DAYS (Patient not taking: Reported on 9/22/2022)       Objective     /82   Pulse 80   Temp 98 °F (36 7 °C) (Temporal)   Resp 12   Ht 5' 11" (1 803 m)   Wt 122 kg (269 lb)   SpO2 98%   BMI 37 52 kg/m²     Physical Exam  Constitutional:       General: He is not in acute distress  Appearance: Normal appearance  He is well-developed  He is obese  HENT:      Head: Normocephalic and atraumatic  Eyes:      General:         Right eye: No discharge  Left eye: No discharge  Conjunctiva/sclera: Conjunctivae normal       Pupils: Pupils are equal, round, and reactive to light  Neck:      Thyroid: No thyromegaly  Vascular: No JVD  Cardiovascular:      Rate and Rhythm: Normal rate and regular rhythm  Heart sounds: Normal heart sounds  No murmur heard  Pulmonary:      Effort: Pulmonary effort is normal       Breath sounds: Normal breath sounds   No wheezing or rales  Abdominal:      General: Bowel sounds are normal       Palpations: Abdomen is soft  There is no mass  Tenderness: There is no abdominal tenderness  There is no guarding or rebound  Comments: OBESE   Musculoskeletal:         General: No tenderness or deformity  Normal range of motion  Cervical back: Neck supple  Lymphadenopathy:      Cervical: No cervical adenopathy  Skin:     General: Skin is warm and dry  Findings: No erythema or rash  Neurological:      General: No focal deficit present  Mental Status: He is alert and oriented to person, place, and time  Psychiatric:         Mood and Affect: Mood normal          Behavior: Behavior normal          Thought Content:  Thought content normal          Judgment: Judgment normal        Reji Jaquez MD

## 2022-09-23 LAB
ALBUMIN SERPL-MCNC: 5.1 G/DL (ref 4–5)
ALBUMIN/GLOB SERPL: 2 {RATIO} (ref 1.2–2.2)
ALP SERPL-CCNC: 59 IU/L (ref 44–121)
ALT SERPL-CCNC: 16 IU/L (ref 0–44)
AST SERPL-CCNC: 19 IU/L (ref 0–40)
B BURGDOR IGG PATRN SER IB-IMP: NEGATIVE
B BURGDOR IGM PATRN SER IB-IMP: NEGATIVE
B BURGDOR18KD IGG SER QL IB: NORMAL
B BURGDOR23KD IGG SER QL IB: NORMAL
B BURGDOR23KD IGM SER QL IB: NORMAL
B BURGDOR28KD IGG SER QL IB: NORMAL
B BURGDOR30KD IGG SER QL IB: NORMAL
B BURGDOR39KD IGG SER QL IB: NORMAL
B BURGDOR39KD IGM SER QL IB: NORMAL
B BURGDOR41KD IGG SER QL IB: NORMAL
B BURGDOR41KD IGM SER QL IB: NORMAL
B BURGDOR45KD IGG SER QL IB: NORMAL
B BURGDOR58KD IGG SER QL IB: NORMAL
B BURGDOR66KD IGG SER QL IB: NORMAL
B BURGDOR93KD IGG SER QL IB: NORMAL
BASOPHILS # BLD AUTO: 0.1 X10E3/UL (ref 0–0.2)
BASOPHILS NFR BLD AUTO: 1 %
BILIRUB SERPL-MCNC: 0.6 MG/DL (ref 0–1.2)
BUN SERPL-MCNC: 14 MG/DL (ref 6–20)
BUN/CREAT SERPL: 15 (ref 9–20)
CALCIUM SERPL-MCNC: 9.6 MG/DL (ref 8.7–10.2)
CHLORIDE SERPL-SCNC: 99 MMOL/L (ref 96–106)
CO2 SERPL-SCNC: 23 MMOL/L (ref 20–29)
CREAT SERPL-MCNC: 0.91 MG/DL (ref 0.76–1.27)
CRP SERPL-MCNC: 3 MG/L (ref 0–10)
EGFR: 116 ML/MIN/1.73
EOSINOPHIL # BLD AUTO: 0.2 X10E3/UL (ref 0–0.4)
EOSINOPHIL NFR BLD AUTO: 2 %
ERYTHROCYTE [DISTWIDTH] IN BLOOD BY AUTOMATED COUNT: 13 % (ref 11.6–15.4)
ERYTHROCYTE [SEDIMENTATION RATE] IN BLOOD BY WESTERGREN METHOD: 10 MM/HR (ref 0–15)
GLOBULIN SER-MCNC: 2.6 G/DL (ref 1.5–4.5)
GLUCOSE SERPL-MCNC: 88 MG/DL (ref 65–99)
HCT VFR BLD AUTO: 42.9 % (ref 37.5–51)
HGB BLD-MCNC: 14.3 G/DL (ref 13–17.7)
IMM GRANULOCYTES # BLD: 0 X10E3/UL (ref 0–0.1)
IMM GRANULOCYTES NFR BLD: 0 %
LYMPHOCYTES # BLD AUTO: 2 X10E3/UL (ref 0.7–3.1)
LYMPHOCYTES NFR BLD AUTO: 23 %
MCH RBC QN AUTO: 27.4 PG (ref 26.6–33)
MCHC RBC AUTO-ENTMCNC: 33.3 G/DL (ref 31.5–35.7)
MCV RBC AUTO: 82 FL (ref 79–97)
MONOCYTES # BLD AUTO: 0.7 X10E3/UL (ref 0.1–0.9)
MONOCYTES NFR BLD AUTO: 9 %
NEUTROPHILS # BLD AUTO: 5.5 X10E3/UL (ref 1.4–7)
NEUTROPHILS NFR BLD AUTO: 65 %
PLATELET # BLD AUTO: 229 X10E3/UL (ref 150–450)
POTASSIUM SERPL-SCNC: 4.1 MMOL/L (ref 3.5–5.2)
PROT SERPL-MCNC: 7.7 G/DL (ref 6–8.5)
RBC # BLD AUTO: 5.22 X10E6/UL (ref 4.14–5.8)
SODIUM SERPL-SCNC: 140 MMOL/L (ref 134–144)
TSH SERPL DL<=0.005 MIU/L-ACNC: 2.41 UIU/ML (ref 0.45–4.5)
WBC # BLD AUTO: 8.5 X10E3/UL (ref 3.4–10.8)

## 2022-09-30 ENCOUNTER — TELEPHONE (OUTPATIENT)
Dept: FAMILY MEDICINE CLINIC | Facility: CLINIC | Age: 32
End: 2022-09-30

## 2022-10-11 ENCOUNTER — TELEPHONE (OUTPATIENT)
Dept: FAMILY MEDICINE CLINIC | Facility: CLINIC | Age: 32
End: 2022-10-11

## 2022-10-11 NOTE — TELEPHONE ENCOUNTER
Hailee El saw you last week  He is calling with an update  He is not feeling better  Still having really bad headaches    Stated that his vision is weird too (headlights seem blurry, eyes are sensitive, lower eye lid is twtiching)

## 2022-10-13 ENCOUNTER — OFFICE VISIT (OUTPATIENT)
Dept: FAMILY MEDICINE CLINIC | Facility: CLINIC | Age: 32
End: 2022-10-13
Payer: COMMERCIAL

## 2022-10-13 VITALS
DIASTOLIC BLOOD PRESSURE: 88 MMHG | HEART RATE: 96 BPM | SYSTOLIC BLOOD PRESSURE: 130 MMHG | HEIGHT: 71 IN | RESPIRATION RATE: 12 BRPM | BODY MASS INDEX: 37.94 KG/M2 | OXYGEN SATURATION: 97 % | TEMPERATURE: 98 F | WEIGHT: 271 LBS

## 2022-10-13 DIAGNOSIS — F41.9 ANXIETY: ICD-10-CM

## 2022-10-13 DIAGNOSIS — R51.9 NONINTRACTABLE HEADACHE, UNSPECIFIED CHRONICITY PATTERN, UNSPECIFIED HEADACHE TYPE: Primary | ICD-10-CM

## 2022-10-13 PROCEDURE — 99213 OFFICE O/P EST LOW 20 MIN: CPT | Performed by: FAMILY MEDICINE

## 2022-10-13 RX ORDER — DICLOFENAC SODIUM 75 MG/1
75 TABLET, DELAYED RELEASE ORAL 2 TIMES DAILY
Qty: 28 TABLET | Refills: 1 | Status: SHIPPED | OUTPATIENT
Start: 2022-10-13

## 2022-10-13 RX ORDER — BUPRENORPHINE HYDROCHLORIDE AND NALOXONE HYDROCHLORIDE 2.9; .71 MG/1; MG/1
TABLET, ORALLY DISINTEGRATING SUBLINGUAL DAILY
COMMUNITY
Start: 2022-10-03

## 2022-10-14 NOTE — PROGRESS NOTES
Name: Kareen Gomez      : 1990      MRN: [id number]  Encounter Provider: Paulo Geiger MD  Encounter Date: 10/13/2022   Encounter department: [facility]     1  Nonintractable headache, unspecified chronicity pattern, unspecified headache type  -     diclofenac (VOLTAREN) 75 mg EC tablet; Take 1 tablet (75 mg total) by mouth 2 (two) times a day WITH FOOD  -     MRI brain w wo contrast; Future; Expected date: 10/13/2022    2  Anxiety         Subjective      PERSISTENT HA  X 3-4 WEEKS  COMES AND GOES  MANY TIMES ASSOCIATED WITH SOME BLURRED VISION  SOME PHOTOPHOBIA AT TIMES    DENIES ANY NUMBNESS, TINGLING OR WEAKNESS  NO FEVER OR CHILLS  NO RECENT ILLNESS  SL NAUSEA AT TIMES  NO HX OF TRAUMA    Review of Systems   Constitutional: Negative for chills, fatigue and fever  HENT: Negative for congestion, ear discharge, ear pain, mouth sores, postnasal drip, sore throat and trouble swallowing  Eyes: Positive for photophobia and visual disturbance  Negative for pain and discharge  Respiratory: Negative for cough, chest tightness, shortness of breath and wheezing  Cardiovascular: Negative for chest pain, palpitations and leg swelling  Gastrointestinal: Negative for abdominal distention, abdominal pain, blood in stool, diarrhea, nausea and vomiting  Endocrine: Negative for polydipsia, polyphagia and polyuria  Genitourinary: Negative for dysuria, frequency, hematuria and urgency  Musculoskeletal: Negative for arthralgias, gait problem and joint swelling  Skin: Negative for pallor and rash  Neurological: Positive for headaches  Negative for dizziness, syncope, speech difficulty, weakness, light-headedness and numbness  Hematological: Negative for adenopathy  Psychiatric/Behavioral: Negative for behavioral problems, confusion and sleep disturbance  The patient is nervous/anxious          Current Outpatient Medications on File Prior to Visit   Medication Sig   • albuterol (PROVENTIL HFA,VENTOLIN HFA) 90 mcg/act inhaler Inhale 2 puffs every 4 (four) hours as needed for wheezing   • ALPRAZolam (XANAX) 0 25 mg tablet Take 1 tablet (0 25 mg total) by mouth 2 (two) times a day as needed for anxiety   • fluticasone (FLONASE) 50 mcg/act nasal spray 1 spray into each nostril daily   • [DISCONTINUED] ZUBSOLV 5 7-1 4 MG SUBL daily  (Patient not taking: Reported on 10/13/2022)       Objective     /88 (BP Location: Left arm, Patient Position: Sitting, Cuff Size: Large)   Pulse 96   Temp 98 °F (36 7 °C) (Temporal)   Resp 12   Ht 5' 11" (1 803 m)   Wt 123 kg (271 lb)   SpO2 97%   BMI 37 80 kg/m²     Physical Exam  Constitutional:       Appearance: Normal appearance  He is well-developed  He is obese  HENT:      Head: Normocephalic and atraumatic  Eyes:      General:         Right eye: No discharge  Left eye: No discharge  Conjunctiva/sclera: Conjunctivae normal       Pupils: Pupils are equal, round, and reactive to light  Neck:      Thyroid: No thyromegaly  Vascular: No JVD  Cardiovascular:      Rate and Rhythm: Normal rate and regular rhythm  Heart sounds: Normal heart sounds  No murmur heard  Pulmonary:      Effort: Pulmonary effort is normal       Breath sounds: Normal breath sounds  No wheezing or rales  Abdominal:      General: Bowel sounds are normal       Palpations: Abdomen is soft  There is no mass  Tenderness: There is no abdominal tenderness  There is no guarding or rebound  Musculoskeletal:         General: No tenderness or deformity  Normal range of motion  Cervical back: Neck supple  Lymphadenopathy:      Cervical: No cervical adenopathy  Skin:     General: Skin is warm and dry  Findings: No erythema or rash  Neurological:      General: No focal deficit present  Mental Status: He is alert and oriented to person, place, and time        Cranial Nerves: No cranial nerve deficit  Sensory: No sensory deficit  Motor: No weakness  Coordination: Coordination normal       Gait: Gait normal       Deep Tendon Reflexes: Reflexes normal    Psychiatric:         Behavior: Behavior normal          Thought Content:  Thought content normal          Judgment: Judgment normal        Paige Aragon MD

## 2022-10-14 NOTE — PATIENT INSTRUCTIONS
PLENTY OF FLUIDS- HYDRATION  TRIAL OF DICLOFENAC  MRI BRAIN  OPHTHALMOLOGY CONSULT  MONITOR SYMPTOMS

## 2022-10-31 DIAGNOSIS — F41.9 ANXIETY: ICD-10-CM

## 2022-10-31 RX ORDER — ALPRAZOLAM 0.25 MG/1
0.25 TABLET ORAL 2 TIMES DAILY PRN
Qty: 60 TABLET | Refills: 0 | Status: SHIPPED | OUTPATIENT
Start: 2022-10-31

## 2022-11-04 ENCOUNTER — TELEPHONE (OUTPATIENT)
Dept: FAMILY MEDICINE CLINIC | Facility: CLINIC | Age: 32
End: 2022-11-04

## 2022-11-04 NOTE — TELEPHONE ENCOUNTER
Андрей Davey called during the a time where the computers lost     He had a 20 minute 6 month check  He was cancelling his appt    Lm for him to reschedule

## 2022-11-18 ENCOUNTER — HOSPITAL ENCOUNTER (OUTPATIENT)
Dept: RADIOLOGY | Facility: HOSPITAL | Age: 32
Discharge: HOME | End: 2022-11-18

## 2022-11-18 DIAGNOSIS — R51.9 NONINTRACTABLE HEADACHE, UNSPECIFIED CHRONICITY PATTERN, UNSPECIFIED HEADACHE TYPE: ICD-10-CM

## 2022-11-18 RX ADMIN — GADOBUTROL 12 ML: 604.72 INJECTION INTRAVENOUS at 15:12

## 2022-11-22 ENCOUNTER — TELEPHONE (OUTPATIENT)
Dept: FAMILY MEDICINE CLINIC | Facility: CLINIC | Age: 32
End: 2022-11-22

## 2022-11-22 NOTE — TELEPHONE ENCOUNTER
When I spoke to the pt about hus results he rescheduled his six month f/u  Do you want to order any labs?  [facility]

## 2022-11-28 ENCOUNTER — TELEPHONE (OUTPATIENT)
Dept: FAMILY MEDICINE CLINIC | Facility: CLINIC | Age: 32
End: 2022-11-28

## 2022-12-30 DIAGNOSIS — F41.9 ANXIETY: ICD-10-CM

## 2022-12-30 RX ORDER — ALPRAZOLAM 0.25 MG/1
0.25 TABLET ORAL 2 TIMES DAILY PRN
Qty: 60 TABLET | Refills: 0 | Status: SHIPPED | OUTPATIENT
Start: 2022-12-30

## 2023-01-04 ENCOUNTER — TELEMEDICINE (OUTPATIENT)
Dept: FAMILY MEDICINE CLINIC | Facility: CLINIC | Age: 33
End: 2023-01-04

## 2023-01-04 VITALS — HEIGHT: 71 IN | WEIGHT: 271 LBS | BODY MASS INDEX: 37.94 KG/M2

## 2023-01-04 DIAGNOSIS — J45.20 MILD INTERMITTENT ASTHMA WITHOUT COMPLICATION: ICD-10-CM

## 2023-01-04 DIAGNOSIS — M54.42 CHRONIC MIDLINE LOW BACK PAIN WITH LEFT-SIDED SCIATICA: ICD-10-CM

## 2023-01-04 DIAGNOSIS — G89.29 CHRONIC MIDLINE LOW BACK PAIN WITH LEFT-SIDED SCIATICA: ICD-10-CM

## 2023-01-04 DIAGNOSIS — F41.9 ANXIETY: Primary | ICD-10-CM

## 2023-01-04 DIAGNOSIS — F41.0 PANIC DISORDER WITHOUT AGORAPHOBIA: ICD-10-CM

## 2023-01-04 NOTE — PROGRESS NOTES
Virtual Regular Visit    Verification of patient location:    Patient is located in the following state in which I hold an active license [place]      Assessment/Plan:    Problem List Items Addressed This Visit        Respiratory    Mild intermittent asthma without complication     STABLE            Nervous and Auditory    Chronic midline low back pain with left-sided sciatica     DOING WELL  MINIMAL DISCOMFORT            Other    Anxiety - Primary     STABLE  TAKING ALPRAZOLAM AT NIGHT   NO ISSUES    APPROX 2 PANIC ISSUES A MONTH - VERY MILD           Panic disorder without agoraphobia       BMI Counseling: Body mass index is 37 8 kg/m²  The BMI is above normal  Nutrition recommendations include encouraging healthy choices of fruits and vegetables and moderation in carbohydrate intake  Exercise recommendations include exercising 3-5 times per week  No pharmacotherapy was ordered  Rationale for BMI follow-up plan is due to patient being overweight or obese  Reason for visit is   Chief Complaint   Patient presents with   • Follow-up     Six month f/u  • Virtual Regular Visit        Encounter provider Paige Aragon MD    Provider located at [address] [place] [address]  [id number]      Recent Visits  No visits were found meeting these conditions  Showing recent visits within past 7 days and meeting all other requirements  Today's Visits  Date Type Provider Dept   01/04/23 Telemedicine Paige Aragon MD [facility]   Showing today's visits and meeting all other requirements  Future Appointments  No visits were found meeting these conditions  Showing future appointments within next 150 days and meeting all other requirements       The patient was identified by name and date of birth   Jenny Neighbor was informed that this is a telemedicine visit and that the visit is being conducted through the [employer] platform  He agrees to proceed     My office door was closed  No one else was in the room  He acknowledged consent and understanding of privacy and security of the video platform  The patient has agreed to participate and understands they can discontinue the visit at any time  Patient is aware this is a billable service  Subjective  Virginia Layton is a 33 y o  male      PATIENT RETURNS FOR ROUTINE EVALUATION OF PATIENT'S MEDICAL ISSUES    INDIVIDUAL MEDICAL ISSUES WITH THEIR CURRENT STATUS, ASSESSMENT AND PLANS ARE LISTED ABOVE    MED CHECK         Past Medical History:   Diagnosis Date   • Asthma    • Carpal tunnel syndrome     Right - Last Assessed: April 4, 2016    • Exercise-induced asthma     Last Assessed: September 16, 2014       Past Surgical History:   Procedure Laterality Date   • HIP SURGERY         Current Outpatient Medications   Medication Sig Dispense Refill   • albuterol (PROVENTIL HFA,VENTOLIN HFA) 90 mcg/act inhaler Inhale 2 puffs every 4 (four) hours as needed for wheezing 8 5 g 1   • ALPRAZolam (XANAX) 0 25 mg tablet Take 1 tablet (0 25 mg total) by mouth 2 (two) times a day as needed for anxiety 60 tablet 0   • diclofenac (VOLTAREN) 75 mg EC tablet Take 1 tablet (75 mg total) by mouth 2 (two) times a day WITH FOOD 28 tablet 1   • fluticasone (FLONASE) 50 mcg/act nasal spray 1 spray into each nostril daily     • Zubsolv 2 9-0 71 MG SUBL in the morning       No current facility-administered medications for this visit  Allergies   Allergen Reactions   • Molds & Smuts Itching       Review of Systems   Constitutional: Negative for chills, fatigue and fever  HENT: Negative for congestion, ear discharge, ear pain, mouth sores, postnasal drip, sore throat and trouble swallowing  Eyes: Negative for pain, discharge and visual disturbance  Respiratory: Negative for cough, shortness of breath and wheezing      Cardiovascular: Negative for chest pain, palpitations and leg swelling  Gastrointestinal: Negative for abdominal distention, abdominal pain, blood in stool, diarrhea and nausea  Endocrine: Negative for polydipsia, polyphagia and polyuria  Genitourinary: Negative for dysuria, frequency, hematuria and urgency  Musculoskeletal: Negative for arthralgias, gait problem and joint swelling  Skin: Negative for pallor and rash  Neurological: Negative for dizziness, syncope, speech difficulty, weakness, light-headedness, numbness and headaches  Hematological: Negative for adenopathy  Psychiatric/Behavioral: Negative for behavioral problems, confusion and sleep disturbance  The patient is nervous/anxious          Video Exam    Vitals:    01/04/23 1015   Weight: 123 kg (271 lb)   Height: 5' 11" (1 803 m)       Physical Exam     PATIENT VISUALLY APPEARS WELL IN NO OBVIOUS DISTRESS      PATIENT VISUALLY APPEARS WELL IN NO OBVIOUS DISTRESS      I spent 18 minutes directly with the patient during this visit

## 2023-02-17 DIAGNOSIS — F41.9 ANXIETY: ICD-10-CM

## 2023-02-18 RX ORDER — ALPRAZOLAM 0.25 MG/1
0.25 TABLET ORAL 2 TIMES DAILY PRN
Qty: 60 TABLET | Refills: 0 | Status: SHIPPED | OUTPATIENT
Start: 2023-02-18

## 2023-03-08 DIAGNOSIS — J45.20 MILD INTERMITTENT ASTHMA WITHOUT COMPLICATION: ICD-10-CM

## 2023-03-08 RX ORDER — ALBUTEROL SULFATE 90 UG/1
2 AEROSOL, METERED RESPIRATORY (INHALATION) EVERY 4 HOURS PRN
Qty: 8.5 G | Refills: 1 | Status: SHIPPED | OUTPATIENT
Start: 2023-03-08

## 2023-03-08 NOTE — TELEPHONE ENCOUNTER
Requested medication(s) are due for refill today: Yes  Patient has already received a courtesy refill: No  Other reason request has been forwarded to provider: Manual Review Message: If patient with asthma requests more than 1 inhaler every 3 months, assess for uncontrolled symptoms and/or notify provider

## 2023-05-12 DIAGNOSIS — Z13.220 LIPID SCREENING: ICD-10-CM

## 2023-05-12 DIAGNOSIS — Z11.4 SCREENING FOR HIV (HUMAN IMMUNODEFICIENCY VIRUS): ICD-10-CM

## 2023-05-12 DIAGNOSIS — Z13.29 THYROID DISORDER SCREEN: ICD-10-CM

## 2023-05-12 DIAGNOSIS — Z00.00 ROUTINE GENERAL MEDICAL EXAMINATION AT A HEALTH CARE FACILITY: Primary | ICD-10-CM

## 2023-05-12 DIAGNOSIS — Z13.6 SCREENING FOR HYPERTENSION: ICD-10-CM

## 2023-05-12 DIAGNOSIS — Z11.59 NEED FOR HEPATITIS C SCREENING TEST: ICD-10-CM

## 2023-06-05 DIAGNOSIS — F41.9 ANXIETY: ICD-10-CM

## 2023-06-05 LAB
BASOPHILS # BLD AUTO: 0.1 X10E3/UL (ref 0–0.2)
BASOPHILS NFR BLD AUTO: 1 %
EOSINOPHIL # BLD AUTO: 0.2 X10E3/UL (ref 0–0.4)
EOSINOPHIL NFR BLD AUTO: 4 %
ERYTHROCYTE [DISTWIDTH] IN BLOOD BY AUTOMATED COUNT: 12.8 % (ref 11.6–15.4)
HCT VFR BLD AUTO: 43.4 % (ref 37.5–51)
HGB BLD-MCNC: 14.1 G/DL (ref 13–17.7)
IMM GRANULOCYTES # BLD: 0 X10E3/UL (ref 0–0.1)
IMM GRANULOCYTES NFR BLD: 0 %
LYMPHOCYTES # BLD AUTO: 1.9 X10E3/UL (ref 0.7–3.1)
LYMPHOCYTES NFR BLD AUTO: 30 %
MCH RBC QN AUTO: 26.7 PG (ref 26.6–33)
MCHC RBC AUTO-ENTMCNC: 32.5 G/DL (ref 31.5–35.7)
MCV RBC AUTO: 82 FL (ref 79–97)
MONOCYTES # BLD AUTO: 0.7 X10E3/UL (ref 0.1–0.9)
MONOCYTES NFR BLD AUTO: 11 %
NEUTROPHILS # BLD AUTO: 3.4 X10E3/UL (ref 1.4–7)
NEUTROPHILS NFR BLD AUTO: 54 %
PLATELET # BLD AUTO: 224 X10E3/UL (ref 150–450)
RBC # BLD AUTO: 5.29 X10E6/UL (ref 4.14–5.8)
WBC # BLD AUTO: 6.3 X10E3/UL (ref 3.4–10.8)

## 2023-06-06 ENCOUNTER — OFFICE VISIT (OUTPATIENT)
Dept: FAMILY MEDICINE CLINIC | Facility: CLINIC | Age: 33
End: 2023-06-06
Payer: COMMERCIAL

## 2023-06-06 VITALS
HEIGHT: 71 IN | DIASTOLIC BLOOD PRESSURE: 80 MMHG | TEMPERATURE: 97.6 F | WEIGHT: 245 LBS | HEART RATE: 76 BPM | OXYGEN SATURATION: 97 % | BODY MASS INDEX: 34.3 KG/M2 | RESPIRATION RATE: 12 BRPM | SYSTOLIC BLOOD PRESSURE: 114 MMHG

## 2023-06-06 DIAGNOSIS — Z13.6 SCREENING FOR HYPERTENSION: ICD-10-CM

## 2023-06-06 DIAGNOSIS — Z13.29 SCREENING FOR HYPOTHYROIDISM: ICD-10-CM

## 2023-06-06 DIAGNOSIS — J45.20 MILD INTERMITTENT ASTHMA WITHOUT COMPLICATION: ICD-10-CM

## 2023-06-06 DIAGNOSIS — F41.9 ANXIETY: ICD-10-CM

## 2023-06-06 DIAGNOSIS — Z13.220 SCREENING FOR HYPERLIPIDEMIA: ICD-10-CM

## 2023-06-06 DIAGNOSIS — Z00.00 ROUTINE GENERAL MEDICAL EXAMINATION AT A HEALTH CARE FACILITY: Primary | ICD-10-CM

## 2023-06-06 DIAGNOSIS — G89.29 CHRONIC MIDLINE LOW BACK PAIN WITH LEFT-SIDED SCIATICA: ICD-10-CM

## 2023-06-06 DIAGNOSIS — M54.42 CHRONIC MIDLINE LOW BACK PAIN WITH LEFT-SIDED SCIATICA: ICD-10-CM

## 2023-06-06 LAB
ALBUMIN SERPL-MCNC: 4.7 G/DL (ref 4–5)
ALBUMIN/GLOB SERPL: 2.1 {RATIO} (ref 1.2–2.2)
ALP SERPL-CCNC: 58 IU/L (ref 44–121)
ALT SERPL-CCNC: 17 IU/L (ref 0–44)
AST SERPL-CCNC: 20 IU/L (ref 0–40)
BILIRUB SERPL-MCNC: 0.6 MG/DL (ref 0–1.2)
BUN SERPL-MCNC: 14 MG/DL (ref 6–20)
BUN/CREAT SERPL: 15 (ref 9–20)
CALCIUM SERPL-MCNC: 9.3 MG/DL (ref 8.7–10.2)
CHLORIDE SERPL-SCNC: 100 MMOL/L (ref 96–106)
CHOLEST SERPL-MCNC: 164 MG/DL (ref 100–199)
CHOLEST/HDLC SERPL: 4 RATIO (ref 0–5)
CO2 SERPL-SCNC: 23 MMOL/L (ref 20–29)
CREAT SERPL-MCNC: 0.92 MG/DL (ref 0.76–1.27)
EGFR: 114 ML/MIN/1.73
GLOBULIN SER-MCNC: 2.2 G/DL (ref 1.5–4.5)
GLUCOSE SERPL-MCNC: 88 MG/DL (ref 70–99)
HCV AB S/CO SERPL IA: NON REACTIVE
HDLC SERPL-MCNC: 41 MG/DL
HIV 1+2 AB+HIV1 P24 AG SERPL QL IA: NON REACTIVE
LDLC SERPL CALC-MCNC: 101 MG/DL (ref 0–99)
POTASSIUM SERPL-SCNC: 4.2 MMOL/L (ref 3.5–5.2)
PROT SERPL-MCNC: 6.9 G/DL (ref 6–8.5)
SL AMB VLDL CHOLESTEROL CALC: 22 MG/DL (ref 5–40)
SODIUM SERPL-SCNC: 137 MMOL/L (ref 134–144)
TRIGL SERPL-MCNC: 119 MG/DL (ref 0–149)
TSH SERPL DL<=0.005 MIU/L-ACNC: 2.95 UIU/ML (ref 0.45–4.5)

## 2023-06-06 PROCEDURE — 93000 ELECTROCARDIOGRAM COMPLETE: CPT | Performed by: FAMILY MEDICINE

## 2023-06-06 PROCEDURE — 99395 PREV VISIT EST AGE 18-39: CPT | Performed by: FAMILY MEDICINE

## 2023-06-06 RX ORDER — ALPRAZOLAM 0.25 MG/1
0.25 TABLET ORAL 2 TIMES DAILY PRN
Qty: 60 TABLET | Refills: 0 | Status: SHIPPED | OUTPATIENT
Start: 2023-06-06

## 2023-06-06 NOTE — LETTER
VONDA PEREIRA        Current Outpatient Medications:     albuterol (PROVENTIL HFA,VENTOLIN HFA) 90 mcg/act inhaler, Inhale 2 puffs every 4 (four) hours as needed for wheezing, Disp: 8 5 g, Rfl: 1    ALPRAZolam (XANAX) 0 25 mg tablet, Take 1 tablet (0 25 mg total) by mouth 2 (two) times a day as needed for anxiety, Disp: 60 tablet, Rfl: 0    fluticasone (FLONASE) 50 mcg/act nasal spray, 1 spray into each nostril daily, Disp: , Rfl:     Zubsolv 2 9-0 71 MG SUBL, in the morning, Disp: , Rfl:     Recent Results (from the past 672 hour(s))   CBC and differential    Collection Time: 06/05/23 12:08 PM   Result Value Ref Range    White Blood Cell Count 6 3 3 4 - 10 8 x10E3/uL    Red Blood Cell Count 5 29 4  14 - 5 80 x10E6/uL    Hemoglobin 14 1 13 0 - 17 7 g/dL    HCT 43 4 37 5 - 51 0 %    MCV 82 79 - 97 fL    MCH 26 7 26 6 - 33 0 pg    MCHC 32 5 31 5 - 35 7 g/dL    RDW 12 8 11 6 - 15 4 %    Platelet Count 629 510 - 450 x10E3/uL    Neutrophils 54 Not Estab  %    Lymphocytes 30 Not Estab  %    Monocytes 11 Not Estab  %    Eosinophils 4 Not Estab  %    Basophils PCT 1 Not Estab  %    Neutrophils (Absolute) 3 4 1 4 - 7 0 x10E3/uL    Lymphocytes (Absolute) 1 9 0 7 - 3 1 x10E3/uL    Monocytes (Absolute) 0 7 0 1 - 0 9 x10E3/uL    Eosinophils (Absolute) 0 2 0 0 - 0 4 x10E3/uL    Basophils ABS 0 1 0 0 - 0 2 x10E3/uL    Immature Granulocytes 0 Not Estab  %    Immature Granulocytes (Absolute) 0 0 0 0 - 0 1 x10E3/uL   Comprehensive metabolic panel    Collection Time: 06/05/23 12:08 PM   Result Value Ref Range    Glucose, Random 88 70 - 99 mg/dL    BUN 14 6 - 20 mg/dL    Creatinine 0 92 0 76 - 1 27 mg/dL    eGFR 114 >59 mL/min/1 73    SL AMB BUN/CREATININE RATIO 15 9 - 20    Sodium 137 134 - 144 mmol/L    Potassium 4 2 3 5 - 5 2 mmol/L    Chloride 100 96 - 106 mmol/L    CO2 23 20 - 29 mmol/L    CALCIUM 9 3 8 7 - 10 2 mg/dL    Protein, Total 6 9 6 0 - 8 5 g/dL    Albumin 4 7 4 0 - 5 0 g/dL    Globulin, Total 2 2 1 5 - 4 5 g/dL Albumin/Globulin Ratio 2 1 1 2 - 2 2    TOTAL BILIRUBIN 0 6 0 0 - 1 2 mg/dL    Alk Phos Isoenzymes 58 44 - 121 IU/L    AST 20 0 - 40 IU/L    ALT 17 0 - 44 IU/L   Human Immunodeficiency Virus 1/2 Antigen / Antibody ( Fourth Generation) with Reflex Testing    Collection Time: 06/05/23 12:08 PM   Result Value Ref Range    HIV Screen 4th Generation wRflx Non Reactive Non Reactive   Hepatitis C antibody    Collection Time: 06/05/23 12:08 PM   Result Value Ref Range    HEP C AB Non Reactive Non Reactive   Lipid panel    Collection Time: 06/05/23 12:08 PM   Result Value Ref Range    Cholesterol, Total 164 100 - 199 mg/dL    Triglycerides 119 0 - 149 mg/dL    HDL 41 >39 mg/dL    VLDL Cholesterol Calculated 22 5 - 40 mg/dL    LDL Calculated 101 (H) 0 - 99 mg/dL    T   Chol/HDL Ratio 4 0 0 0 - 5 0 ratio   TSH, 3rd generation    Collection Time: 06/05/23 12:08 PM   Result Value Ref Range    TSH 2 950 0 450 - 4 500 uIU/mL

## 2023-06-06 NOTE — PATIENT INSTRUCTIONS
DISCUSSED HEALTH MAINTENENCE ISSUES  BW WILL BE REVIEWED  ENCOURAGED HEALTHY DIET AND EXERCISE    RV FOR ANNUAL HEALTH EXAM IN 1 YEAR  RV SOONER IF THERE ARE ANY CONCERNS      Recent Results (from the past 672 hour(s))   CBC and differential    Collection Time: 06/05/23 12:08 PM   Result Value Ref Range    White Blood Cell Count 6 3 3 4 - 10 8 x10E3/uL    Red Blood Cell Count 5 29 4  14 - 5 80 x10E6/uL    Hemoglobin 14 1 13 0 - 17 7 g/dL    HCT 43 4 37 5 - 51 0 %    MCV 82 79 - 97 fL    MCH 26 7 26 6 - 33 0 pg    MCHC 32 5 31 5 - 35 7 g/dL    RDW 12 8 11 6 - 15 4 %    Platelet Count 680 048 - 450 x10E3/uL    Neutrophils 54 Not Estab  %    Lymphocytes 30 Not Estab  %    Monocytes 11 Not Estab  %    Eosinophils 4 Not Estab  %    Basophils PCT 1 Not Estab  %    Neutrophils (Absolute) 3 4 1 4 - 7 0 x10E3/uL    Lymphocytes (Absolute) 1 9 0 7 - 3 1 x10E3/uL    Monocytes (Absolute) 0 7 0 1 - 0 9 x10E3/uL    Eosinophils (Absolute) 0 2 0 0 - 0 4 x10E3/uL    Basophils ABS 0 1 0 0 - 0 2 x10E3/uL    Immature Granulocytes 0 Not Estab  %    Immature Granulocytes (Absolute) 0 0 0 0 - 0 1 x10E3/uL   Comprehensive metabolic panel    Collection Time: 06/05/23 12:08 PM   Result Value Ref Range    Glucose, Random 88 70 - 99 mg/dL    BUN 14 6 - 20 mg/dL    Creatinine 0 92 0 76 - 1 27 mg/dL    eGFR 114 >59 mL/min/1 73    SL AMB BUN/CREATININE RATIO 15 9 - 20    Sodium 137 134 - 144 mmol/L    Potassium 4 2 3 5 - 5 2 mmol/L    Chloride 100 96 - 106 mmol/L    CO2 23 20 - 29 mmol/L    CALCIUM 9 3 8 7 - 10 2 mg/dL    Protein, Total 6 9 6 0 - 8 5 g/dL    Albumin 4 7 4 0 - 5 0 g/dL    Globulin, Total 2 2 1 5 - 4 5 g/dL    Albumin/Globulin Ratio 2 1 1 2 - 2 2    TOTAL BILIRUBIN 0 6 0 0 - 1 2 mg/dL    Alk Phos Isoenzymes 58 44 - 121 IU/L    AST 20 0 - 40 IU/L    ALT 17 0 - 44 IU/L   Human Immunodeficiency Virus 1/2 Antigen / Antibody ( Fourth Generation) with Reflex Testing    Collection Time: 06/05/23 12:08 PM   Result Value Ref Range    HIV Screen 4th Generation wRflx Non Reactive Non Reactive   Hepatitis C antibody    Collection Time: 06/05/23 12:08 PM   Result Value Ref Range    HEP C AB Non Reactive Non Reactive   Lipid panel    Collection Time: 06/05/23 12:08 PM   Result Value Ref Range    Cholesterol, Total 164 100 - 199 mg/dL    Triglycerides 119 0 - 149 mg/dL    HDL 41 >39 mg/dL    VLDL Cholesterol Calculated 22 5 - 40 mg/dL    LDL Calculated 101 (H) 0 - 99 mg/dL    T   Chol/HDL Ratio 4 0 0 0 - 5 0 ratio   TSH, 3rd generation    Collection Time: 06/05/23 12:08 PM   Result Value Ref Range    TSH 2 950 0 450 - 4 500 uIU/mL

## 2023-06-06 NOTE — PROGRESS NOTES
Depression Screening and Follow-up Plan: Patient was screened for depression during today's encounter  They screened negative with a PHQ-2 score of 0     FAMILY PRACTICE HEALTH MAINTENANCE OFFICE VISIT  [facility]    NAME: Ernesto Lerma  AGE: 33 y o  SEX: male  : 1990     DATE: 2023    Assessment and Plan     Problem List Items Addressed This Visit        Respiratory    Mild intermittent asthma without complication       Nervous and Auditory    Chronic midline low back pain with left-sided sciatica       Other    Anxiety   Other Visit Diagnoses     Routine general medical examination at a health care facility    -  Primary    Screening for hypertension        Relevant Orders    POCT ECG (Completed)    Screening for hyperlipidemia        Screening for hypothyroidism                   Return in about 1 year (around 2024) for Annual physical         Chief Complaint     Chief Complaint   Patient presents with   • Annual Exam       History of Present Illness     [facility] [address] RECORD  NO CONCERNS AT THIS TIME        Well Adult Physical   Patient here for a comprehensive physical exam       Diet and Physical Activity  Diet: well balanced diet  Weight concerns: Patient has class 1 obesity (BMI 30-34  9)  Exercise: intermittently      Depression Screen  PHQ-2/9 Depression Screening    Little interest or pleasure in doing things: 0 - not at all  Feeling down, depressed, or hopeless: 0 - not at all  PHQ-2 Score: 0  PHQ-2 Interpretation: Negative depression screen          General Health  Hearing: Normal:  bilateral  Vision: no vision problems  Dental: regular dental visits    Reproductive Health          The following portions of the patient's history were reviewed and updated as appropriate: allergies, current medications, past family history, past medical history, past social history, past surgical history and problem list     Review of Systems     Review of Systems   Constitutional: Negative for chills, fatigue and fever  HENT: Negative for congestion, ear discharge, ear pain, mouth sores, postnasal drip, sore throat and trouble swallowing  Eyes: Negative for pain, discharge and visual disturbance  Respiratory: Negative for cough, shortness of breath and wheezing  Cardiovascular: Negative for chest pain, palpitations and leg swelling  Gastrointestinal: Negative for abdominal distention, abdominal pain, blood in stool, diarrhea and nausea  Endocrine: Negative for polydipsia, polyphagia and polyuria  Genitourinary: Negative for dysuria, frequency, hematuria and urgency  Musculoskeletal: Negative for arthralgias, gait problem and joint swelling  Skin: Negative for pallor and rash  Neurological: Negative for dizziness, syncope, speech difficulty, weakness, light-headedness, numbness and headaches  Hematological: Negative for adenopathy  Psychiatric/Behavioral: Negative for behavioral problems, confusion and sleep disturbance  The patient is not nervous/anxious          Past Medical History     Past Medical History:   Diagnosis Date   • Asthma    • Carpal tunnel syndrome     Right - Last Assessed: April 4, 2016    • Exercise-induced asthma     Last Assessed: September 16, 2014       Past Surgical History     Past Surgical History:   Procedure Laterality Date   • HIP SURGERY         Social History     Social History     Socioeconomic History   • Marital status: Single     Spouse name: None   • Number of children: None   • Years of education: None   • Highest education level: None   Occupational History   • None   Tobacco Use   • Smoking status: Former   • Smokeless tobacco: Former   • Tobacco comments:     vaping daily since stopped smoking 3 yrs ago   Vaping Use   • Vaping Use: Former   • Substances: Flavoring   Substance and Sexual Activity   • Alcohol use: No   • Drug use: No   • Sexual activity: None "  Other Topics Concern   • None   Social History Narrative    Caffeine use     Dental Care, occasionally     No advance directives     Use of energy drinks      Social Determinants of Health     Financial Resource Strain: Not on file   Food Insecurity: Not on file   Transportation Needs: Not on file   Physical Activity: Not on file   Stress: Not on file   Social Connections: Not on file   Intimate Partner Violence: Not on file   Housing Stability: Not on file       Family History     Family History   Problem Relation Age of Onset   • Other Mother         Lymphedema    • Substance Abuse Family    • No Known Problems Father    • No Known Problems Brother    • Mental illness Neg Hx        Current Medications       Current Outpatient Medications:   •  albuterol (PROVENTIL HFA,VENTOLIN HFA) 90 mcg/act inhaler, Inhale 2 puffs every 4 (four) hours as needed for wheezing, Disp: 8 5 g, Rfl: 1  •  ALPRAZolam (XANAX) 0 25 mg tablet, Take 1 tablet (0 25 mg total) by mouth 2 (two) times a day as needed for anxiety, Disp: 60 tablet, Rfl: 0  •  fluticasone (FLONASE) 50 mcg/act nasal spray, 1 spray into each nostril daily, Disp: , Rfl:   •  Zubsolv 2 9-0 71 MG SUBL, in the morning, Disp: , Rfl:      Allergies     Allergies   Allergen Reactions   • Molds & Smuts Itching       Objective     /80 (BP Location: Left arm, Patient Position: Sitting, Cuff Size: Large)   Pulse 76   Temp 97 6 °F (36 4 °C) (Temporal)   Resp 12   Ht 5' 11\" (1 803 m)   Wt 111 kg (245 lb)   SpO2 97%   BMI 34 17 kg/m²      Physical Exam  Constitutional:       General: He is not in acute distress  Appearance: Normal appearance  He is well-developed  He is not ill-appearing  HENT:      Head: Normocephalic and atraumatic        Right Ear: Tympanic membrane and external ear normal       Left Ear: Tympanic membrane and external ear normal       Nose: Nose normal       Mouth/Throat:      Mouth: Mucous membranes are moist    Eyes:      General:         " Right eye: No discharge  Left eye: No discharge  Conjunctiva/sclera: Conjunctivae normal       Pupils: Pupils are equal, round, and reactive to light  Neck:      Thyroid: No thyromegaly  Vascular: No JVD  Cardiovascular:      Rate and Rhythm: Normal rate and regular rhythm  Heart sounds: Normal heart sounds  No murmur heard  Pulmonary:      Effort: Pulmonary effort is normal       Breath sounds: Normal breath sounds  No wheezing or rales  Abdominal:      General: Bowel sounds are normal       Palpations: Abdomen is soft  There is no mass  Tenderness: There is no abdominal tenderness  There is no guarding or rebound  Genitourinary:     Penis: Normal        Prostate: Normal       Rectum: Normal  Guaiac result negative  Musculoskeletal:         General: No tenderness or deformity  Normal range of motion  Cervical back: Neck supple  Lymphadenopathy:      Cervical: No cervical adenopathy  Skin:     General: Skin is warm and dry  Findings: No erythema or rash  Neurological:      General: No focal deficit present  Mental Status: He is alert and oriented to person, place, and time  Cranial Nerves: No cranial nerve deficit  Sensory: No sensory deficit  Motor: No weakness or abnormal muscle tone  Coordination: Coordination normal       Gait: Gait normal       Deep Tendon Reflexes: Reflexes are normal and symmetric  Reflexes normal    Psychiatric:         Mood and Affect: Mood normal          Behavior: Behavior normal          Thought Content: Thought content normal          Judgment: Judgment normal            No results found      Health Maintenance     Health Maintenance   Topic Date Due   • COVID-19 Vaccine (1) Never done   • Pneumococcal Vaccine: Pediatrics (0 to 5 Years) and At-Risk Patients (6 to 59 Years) (1 - PCV) Never done   • DTaP,Tdap,and Td Vaccines (1 - Tdap) Never done   • PT PLAN OF CARE  09/09/2022   • Influenza Vaccine (Season Ended) 09/01/2023   • BMI: Followup Plan  01/04/2024   • Depression Screening  06/06/2024   • BMI: Adult  06/06/2024   • Annual Physical  06/06/2024   • HIV Screening  Completed   • Hepatitis C Screening  Completed   • HIB Vaccine  Aged Out   • IPV Vaccine  Aged Out   • Hepatitis A Vaccine  Aged Out   • Meningococcal ACWY Vaccine  Aged Out   • HPV Vaccine  Aged Out     Immunization History   Administered Date(s) Administered   • Influenza Quadrivalent, 6-35 Months IM 10/11/2017       César Kelly MD  [facility]

## 2023-07-03 ENCOUNTER — OFFICE VISIT (OUTPATIENT)
Dept: FAMILY MEDICINE CLINIC | Facility: CLINIC | Age: 33
End: 2023-07-03
Payer: COMMERCIAL

## 2023-07-03 ENCOUNTER — TELEPHONE (OUTPATIENT)
Dept: FAMILY MEDICINE CLINIC | Facility: CLINIC | Age: 33
End: 2023-07-03

## 2023-07-03 VITALS
TEMPERATURE: 97 F | SYSTOLIC BLOOD PRESSURE: 116 MMHG | WEIGHT: 246 LBS | DIASTOLIC BLOOD PRESSURE: 82 MMHG | HEIGHT: 71 IN | BODY MASS INDEX: 34.44 KG/M2 | HEART RATE: 92 BPM | RESPIRATION RATE: 12 BRPM | OXYGEN SATURATION: 98 %

## 2023-07-03 DIAGNOSIS — M54.32 SCIATICA OF LEFT SIDE: ICD-10-CM

## 2023-07-03 DIAGNOSIS — G89.29 CHRONIC MIDLINE LOW BACK PAIN WITH LEFT-SIDED SCIATICA: Primary | ICD-10-CM

## 2023-07-03 DIAGNOSIS — M54.42 CHRONIC MIDLINE LOW BACK PAIN WITH LEFT-SIDED SCIATICA: Primary | ICD-10-CM

## 2023-07-03 PROCEDURE — 99213 OFFICE O/P EST LOW 20 MIN: CPT | Performed by: FAMILY MEDICINE

## 2023-07-03 PROCEDURE — 96372 THER/PROPH/DIAG INJ SC/IM: CPT | Performed by: FAMILY MEDICINE

## 2023-07-03 RX ORDER — DEXAMETHASONE SODIUM PHOSPHATE 4 MG/ML
4 INJECTION, SOLUTION INTRA-ARTICULAR; INTRALESIONAL; INTRAMUSCULAR; INTRAVENOUS; SOFT TISSUE ONCE
Status: COMPLETED | OUTPATIENT
Start: 2023-07-03 | End: 2023-07-03

## 2023-07-03 RX ORDER — KETOROLAC TROMETHAMINE 30 MG/ML
60 INJECTION, SOLUTION INTRAMUSCULAR; INTRAVENOUS ONCE
Status: COMPLETED | OUTPATIENT
Start: 2023-07-03 | End: 2023-07-03

## 2023-07-03 RX ORDER — PREDNISONE 20 MG/1
TABLET ORAL
Qty: 14 TABLET | Refills: 0 | Status: SHIPPED | OUTPATIENT
Start: 2023-07-03

## 2023-07-03 RX ADMIN — DEXAMETHASONE SODIUM PHOSPHATE 4 MG: 4 INJECTION, SOLUTION INTRA-ARTICULAR; INTRALESIONAL; INTRAMUSCULAR; INTRAVENOUS; SOFT TISSUE at 12:53

## 2023-07-03 RX ADMIN — KETOROLAC TROMETHAMINE 60 MG: 30 INJECTION, SOLUTION INTRAMUSCULAR; INTRAVENOUS at 12:58

## 2023-07-03 NOTE — TELEPHONE ENCOUNTER
Left a VM message on [facility] [employer] number to call the office and make an appointment if he is still having Sciatica issues. ----- Message from Bushra Ashton MD sent at 7/2/2023  1:07 PM EDT -----  Regarding: FW: Back Pain  Contact: [phone]  SHOULD MAKE AN APPT      ----- Message -----  From: Noé Mcrae  Sent: 6/30/2023   1:24 PM EDT  To: Bushra Ashton MD  Subject: FW: Back Pain                                      ----- Message -----  From: Movie Mouth  Sent: 6/30/2023  12:41 PM EDT  To: [facility]  Subject: Back Pain                                        Hey doc, I know when I was just in for my physical I mentioned having slight sciatica pain and was seeing a chiropractor, but this past week I feel that has not been working and now I’m in a state where I can’t do much of anything but lay on my back. Sitting down is extremely painful in my left buttocks and the pain radiates all the way down my left leg. I can stand for short periods of time but  pain will just start to radiate again. What can I do about this situation?

## 2023-07-03 NOTE — PATIENT INSTRUCTIONS
REST  AVOID LIFTING OR PUSHING  MEDICATION AS DIRECTED  CONSIDER TRAIL OF PT  X RAY    CALL IF SYMPTOMS PERSIST OR WORSEN

## 2023-07-03 NOTE — PROGRESS NOTES
Name: Ok Cleary      : 1990      MRN: [id number]  Encounter Provider: Bill Linares MD  Encounter Date: 7/3/2023   Encounter department: [facility]     1. Chronic midline low back pain with left-sided sciatica  -     dexamethasone (DECADRON) injection 4 mg  -     ketorolac (TORADOL) 60 mg/2 mL IM injection 60 mg  -     predniSONE 20 mg tablet; 2 PO QD X 4 DAYS, THEN 1 PO QD X 4 DAYS, THEN 1/2 PO QD X 4 DAYS  -     XR spine lumbar minimum 4 views non injury; Future; Expected date: 2023  -     Ambulatory Referral to Physical Therapy; Future    2. Sciatica of left side  -     dexamethasone (DECADRON) injection 4 mg  -     ketorolac (TORADOL) 60 mg/2 mL IM injection 60 mg  -     predniSONE 20 mg tablet; 2 PO QD X 4 DAYS, THEN 1 PO QD X 4 DAYS, THEN 1/2 PO QD X 4 DAYS  -     XR spine lumbar minimum 4 views non injury; Future; Expected date: 2023  -     Ambulatory Referral to Physical Therapy; Future           Subjective      PATIENT HAS HX OF CHRONIC LOWER BACK PAIN WITH L SIDED SCIATICA  RECENTLY WAS JET SKIING  INCREASED L SIDED BUTT PAIN  ? 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 HENT: Negative for sore throat. Eyes: Negative for discharge. Respiratory: Negative for cough and chest tightness. Cardiovascular: Negative for chest pain and palpitations. Gastrointestinal: Negative for abdominal pain, diarrhea, nausea and vomiting. Musculoskeletal: Positive for back pain. Negative for arthralgias and gait problem. Neurological: Positive for weakness. Negative for dizziness and headaches. Hematological: Negative for adenopathy. Psychiatric/Behavioral: The patient is not nervous/anxious.         Current Outpatient Medications on File Prior to Visit   Medication Sig   • albuterol (PROVENTIL HFA,VENTOLIN HFA) 90 mcg/act inhaler Inhale 2 puffs every 4 (four) hours as needed for wheezing   • ALPRAZolam (XANAX) 0.25 mg tablet Take 1 tablet (0.25 mg total) by mouth 2 (two) times a day as needed for anxiety   • fluticasone (FLONASE) 50 mcg/act nasal spray 1 spray into each nostril daily   • Zubsolv 2.9-0.71 MG SUBL in the morning       Objective     /82 (BP Location: Right arm, Patient Position: Standing, Cuff Size: Large)   Pulse 92   Temp (!) 97 °F (36.1 °C) (Temporal)   Resp 12   Ht 5' 11" (1.803 m)   Wt 112 kg (246 lb)   SpO2 98%   BMI 34.31 kg/m²     Physical Exam     MINIMAL LOWER LUMBAR SPINE TENDERNESS  MARKED SCIATIC [address] TENDERNESS  POS SLR ON L  GOOD STRENGTH  Alejandrina Avitia MD

## 2023-07-06 ENCOUNTER — EVALUATION (OUTPATIENT)
Dept: PHYSICAL THERAPY | Facility: CLINIC | Age: 33
End: 2023-07-06
Payer: COMMERCIAL

## 2023-07-06 DIAGNOSIS — M54.16 LUMBAR RADICULOPATHY: Primary | ICD-10-CM

## 2023-07-06 DIAGNOSIS — M54.32 SCIATICA OF LEFT SIDE: ICD-10-CM

## 2023-07-06 DIAGNOSIS — G89.29 CHRONIC MIDLINE LOW BACK PAIN WITH LEFT-SIDED SCIATICA: ICD-10-CM

## 2023-07-06 DIAGNOSIS — M54.42 CHRONIC MIDLINE LOW BACK PAIN WITH LEFT-SIDED SCIATICA: ICD-10-CM

## 2023-07-06 PROCEDURE — 97110 THERAPEUTIC EXERCISES: CPT | Performed by: PHYSICAL THERAPIST

## 2023-07-06 PROCEDURE — 97162 PT EVAL MOD COMPLEX 30 MIN: CPT | Performed by: PHYSICAL THERAPIST

## 2023-07-06 NOTE — PROGRESS NOTES
PT Evaluation     Today's date: 2023  Patient name: Gallo Wolfe  : 1990  MRN: [id number]  Referring provider: Truong Devries MD  Dx:   Encounter Diagnosis     ICD-10-CM    1. Lumbar radiculopathy  M54.16       2. Chronic midline low back pain with left-sided sciatica  M54.42 Ambulatory Referral to Physical Therapy    G89.29       3. Sciatica of left side  M54.32 Ambulatory Referral to Physical Therapy                   Assessment  Assessment details: Gallo Wolfe is a 33 y.o. male who presents to physical therapy with CC of chronic low back pain with radiating symptoms down left leg into center of calf. Impairments of note include pain with straightening the leg and trunk rotation, pain with sitting, abnormal or restricted ROM, activity intolerance, impaired physical strength, pain with function, weight-bearing intolerance, and poor body mechanics. Due to these impairments, patient has difficulty driving pain free, sitting long-periods of time, and performing occupational tasks at full function. Patient's clinical presentation is consistent with referring diagnosis of L sided low back and sciatic pain. Patient demonstrates possible directional preference with flexion, will explore further in subsequent visit with repeated motions. Patient has been educated on spinal directional preference, postural education, home exercise program, and the plan of care.  Pt was educated to discontinue HEP should there be an increase in pain or symptoms move more distal.  Pt would likely benefit from skilled physical therapy services to address their aforementioned functional limitations through a targeted program consisting of repeated ROM/flexibility exercises, graded increase in core stabilization exercises, and graded increase in functional activity training in order to  progress towards prior level of function and independence with home exercise program.  Understanding of Dx/Px/POC: excellent    Goals  Short term goals to be accomplished in 2-4weeks:  STG 1: Pt will demo I with lumbar flexion HEP to maximize progress between therapy sessions  STG 2: Pt will report a reduction in worst pain rating by 1-2 on NPRS in order to improve ability to perform driving and occupational tasks with less pain  STG 3: Pt will report less sleep disturbance demonstrated by a decrease in waking up throughout the night   STG 4: Pt will demo L/S AROM < or = min loss throughout to promote improved functional mobility and body mechanics  STG 5: Pt will demo 1/2 MMT grade core stabilizers to improve lumbar stability with functional challenges    Long term goals to be accomplished in 4-8 weeks:  LTG 1: Pt will demo good body mechanics >80% functional challenges to prevent reinjury and promote ability to perform occupational tasks safely  LTG 2: Pt will be able to return to work 4 hours pain free of typical occupation as per PLOF  LTG 3: Pt will demo Good strength core stabilizers to promote carryover with body mech and posture    Plan  Plan details: HEP development, core stabilization, stretching, strengthening, A/AA/PROM, joint mobilizations, posture education, body mechanics training for bending and lifting, lumbar traction, STM/MI as needed to reduce muscle tension, balance and proprioceptive training, muscle reeducation, PLOC discussed and agreed upon with patient.   Patient would benefit from: PT eval and skilled physical therapy  Planned modality interventions: cryotherapy and thermotherapy: hydrocollator packs  Planned therapy interventions: manual therapy, neuromuscular re-education, self care, therapeutic activities, therapeutic exercise, home exercise program, body mechanics training, patient education, postural training, strengthening and stretching  Frequency: 2x week  Duration in weeks: 6  Treatment plan discussed with: patient    Subjective Evaluation  History of Present Illness  Mechanism of injury: Patient explains in the last 5-6 months they have been experiencing more exaggerated sciatica symptoms down the left leg into the calf muscle. Pt reports an experience where he lifted his left leg to get out of a boat and felt an electric shock pain down the leg causing him to collapse. This past Monday (7/3/23) the pt received a steroid shot from PCP. Of note, the pt received PT when they were 18-20 for LBP. Aggravating factors include looking over shoulder while driving and prolonged sitting. To alleviate the pain, the pt has been using a towel while driving and an inversion table at home; in addition the pt performs 20-30 min of stretching daily including a sciatic stretch, Cobra stretch, piriformis stretch. Recurrent probem    Pain  Current pain rating: 3  At best pain ratin  At worst pain ratin  Location: Some pain in back, but more pain down L LE   Quality: needle-like, tight, discomfort and knife-like    Social Support  Steps to enter house: yes  13  Lives with: significant other  Employment status: working in industrial construction. Treatments  Previous treatment: chiropractic, injection treatment and physical therapy  Current treatment: chiropractic and injection treatment  Patient Goals  Patient goals for therapy: decreased pain    Objective     Concurrent Complaints  Positive for disturbed sleep (Pt reports he wakes up in the middle of the night gasping for air, unclear if it associated with the LBP; upon waking up the pt stretches their back).  Negative for night pain, bladder dysfunction (At the time of intense back pain, reports not having had much water, but frequent urinations), bowel dysfunction, saddle (S4) numbness and history of cancer    Additional Special Questions  Recent intentional weight loss: 2023: 270 lbs Current: 235 lbs    Strength/Myotome Testing     Left Hip   Planes of Motion   Flexion: 4-  Abduction: 4-    Right Hip   Planes of Motion   Flexion: 5  Abduction: 4+    Left Knee   Flexion: 4  Extension: 4+    Right Knee   Flexion: 4+  Extension: 4+    ROM:    Flexion: Min (with pain)  Ext: Min (with pain)  R SB: Mod (with pain)  L SB: Min (with pain)  R Rot: WNL  L Rot: WNL    Special Test:    L:    Straight Leg Raise: +  Slump: +    FADIR: -  BOBBY: -         Precautions:   Past Medical History:   Diagnosis Date   • Asthma    • Carpal tunnel syndrome     Right - Last Assessed: April 4, 2016    • Exercise-induced asthma     Last Assessed: September 16, 2014           Manuals 7/6            PA Glides              Neural glides                                        Neuro Re-Ed             SLS              TA activation                                                                               Ther Ex             Hamstring stretch              Lumbar flexion  HEP            Gatroc stretch             Bird Dogs              Squats              Leg Press             Child's Pose 3-ways             Assessment & Management POC with emphasis on directional preference and ROM recovery. Ther Activity                                       Gait Training                                       Modalities                                           Patient treated by MELVINA Hunter under my direct supervision.

## 2023-07-13 ENCOUNTER — OFFICE VISIT (OUTPATIENT)
Dept: PHYSICAL THERAPY | Facility: CLINIC | Age: 33
End: 2023-07-13
Payer: COMMERCIAL

## 2023-07-13 DIAGNOSIS — M54.32 SCIATICA OF LEFT SIDE: ICD-10-CM

## 2023-07-13 DIAGNOSIS — M54.16 LUMBAR RADICULOPATHY: Primary | ICD-10-CM

## 2023-07-13 DIAGNOSIS — G89.29 CHRONIC MIDLINE LOW BACK PAIN WITH LEFT-SIDED SCIATICA: ICD-10-CM

## 2023-07-13 DIAGNOSIS — M54.42 CHRONIC MIDLINE LOW BACK PAIN WITH LEFT-SIDED SCIATICA: ICD-10-CM

## 2023-07-13 PROCEDURE — 97110 THERAPEUTIC EXERCISES: CPT | Performed by: PHYSICAL THERAPIST

## 2023-07-13 NOTE — PROGRESS NOTES
Daily Note     Today's date: 2023  Patient name: Maureen Bang  : 1990  MRN: [id number]  Referring provider: Binta Huntley MD  Dx:   Encounter Diagnosis     ICD-10-CM    1. Lumbar radiculopathy  M54.16       2. Chronic midline low back pain with left-sided sciatica  M54.42     G89.29       3. Sciatica of left side  M54.32                      Subjective:   Pt reports feeling the same today as he did last week. Pt reports not as prevalent back pain since doing the repeated lumbar flexions motions, but increased pain in the calf. Pt reports the calf pain was so intense earlier this week that he contemplated not going into work. Pt reports pins/needles in his foot and occurrences where the foot does not go where he wants it to go, almost like it has a mind of its own. Objective: See treatment diary below      Assessment: Patient tolerated treatment fair as demonstrated by little to no change from lumbar flexion-based HEP with apparent peripheralization throughout the past week (pain in calf), but a reduction of sx with lumbar flexion and rotation performed in session. Patient would benefit from continued PT in order to determine the effect of lumbar flexion and rotation based HEP on symptoms. Plan: Continue per plan of care.       Precautions:   Past Medical History:   Diagnosis Date   • Asthma    • Carpal tunnel syndrome     Right - Last Assessed: 2016    • Exercise-induced asthma     Last Assessed: 2014           Manuals 23            ARANZA Goodwin               Neural elsa                                           Neuro Re-Ed              SLS               TA activation                                                                                     Ther Ex              Hamstring stretch               Lumbar flexion   HEP            Gatroc stretch              Bird Dogs               Squats               Leg Press              Child's Pose 3-ways Seated Figure 4 Stretch 10x10"; knee to opposite shoulder 5x10'             LS Progression (Lima) Standing L flex 2x10; lateral lean at wall 2x10; lateral lean + ext 2x10; L/S flex + rotation w/ PT OP             Assessment & Management Emphasis on flexion and rotation with repeated motion to look for centralization. POC with emphasis on directional preference and ROM recovery. Ther Activity                                          Gait Training                                          Modalities                                          Patient treated by Philemon Cabot, SPT under my direct supervision.

## 2023-07-18 ENCOUNTER — OFFICE VISIT (OUTPATIENT)
Dept: PHYSICAL THERAPY | Facility: CLINIC | Age: 33
End: 2023-07-18
Payer: COMMERCIAL

## 2023-07-18 DIAGNOSIS — G89.29 CHRONIC MIDLINE LOW BACK PAIN WITH LEFT-SIDED SCIATICA: ICD-10-CM

## 2023-07-18 DIAGNOSIS — M54.32 SCIATICA OF LEFT SIDE: ICD-10-CM

## 2023-07-18 DIAGNOSIS — M54.42 CHRONIC MIDLINE LOW BACK PAIN WITH LEFT-SIDED SCIATICA: ICD-10-CM

## 2023-07-18 DIAGNOSIS — M54.16 LUMBAR RADICULOPATHY: Primary | ICD-10-CM

## 2023-07-18 PROCEDURE — 97140 MANUAL THERAPY 1/> REGIONS: CPT | Performed by: PHYSICAL THERAPIST

## 2023-07-18 PROCEDURE — 97110 THERAPEUTIC EXERCISES: CPT | Performed by: PHYSICAL THERAPIST

## 2023-07-18 NOTE — PROGRESS NOTES
Daily Note     Today's date: 2023  Patient name: Gallo Wolfe  : 1990  MRN: [id number]  Referring provider: Truong Devries MD  Dx:   Encounter Diagnosis     ICD-10-CM    1. Lumbar radiculopathy  M54.16       2. Chronic midline low back pain with left-sided sciatica  M54.42     G89.29       3. Sciatica of left side  M54.32                      Subjective:  Pt reports feeling about the same since the last session. Pt explains two days after the last session, the pain down the leg into the calf was the worst it has felt in a while. The CC of the past week has been the tightness in the calf and back of leg that alters his gait mechanics out of fear a wrong step would cause intense pain. Objective: See treatment diary below      Assessment: Tolerated treatment fair as demonstrated by minimal to no change with lumbar flexion/rotation progressions, but improved symptoms with interventions focused on piriformis mobility. Manual therapy of the patient's L piriformis revealed significant tightness in the muscle belly with pressure applied to the muscle reproducing the pt's sx. The patient responded well to interventions, rolling and stretching, directed at piriformis extensibility; the pt reported no calf tightness and minimal to no numbness in the foot. Therefore, the patient would benefit from skilled PT in order to address piriformis tightness potentially contributing to the radiating pain down the L LE. Plan: Continue per plan of care. Progress treatment as tolerated.        Precautions:   Past Medical History:   Diagnosis Date   • Asthma    • Carpal tunnel syndrome     Right - Last Assessed: 2016    • Exercise-induced asthma     Last Assessed: 2014           Manuals 23            PA Glikristen                Neural glides                Piriformis manual Roller BR                             Neuro Re-Ed               SLS                TA activation                                                                                           Ther Ex               Hamstring stretch  W/ green band and cross midline (5x10")              Lumbar flexion    HEP            Gatroc stretch               Bird Dogs                Squats                Leg Press               Child's Pose 3-ways               Seated Figure 4 Stretch  10x10"; knee to opposite shoulder 5x10'             LS Progression (Lima) Supine LS flex + ext; Seated LS flex + ext; standing flex + ext; standing lateral lean with flexion  Standing L flex 2x10; lateral lean at wall 2x10; lateral lean + ext 2x10; L/S flex + rotation w/ PT OP                            Assessment & Management Emphasis on piriformis extensibility and tightness contributing to compression of sciatic nerve and related Sx Emphasis on flexion and rotation with repeated motion to look for centralization. POC with emphasis on directional preference and ROM recovery. Ther Activity                                             Gait Training                                             Modalities                                             Patient treated by Cheryll Kehr, SPT under my direct supervision.

## 2023-07-20 ENCOUNTER — APPOINTMENT (OUTPATIENT)
Dept: PHYSICAL THERAPY | Facility: CLINIC | Age: 33
End: 2023-07-20
Payer: COMMERCIAL

## 2023-07-20 ENCOUNTER — OFFICE VISIT (OUTPATIENT)
Dept: PHYSICAL THERAPY | Facility: CLINIC | Age: 33
End: 2023-07-20
Payer: COMMERCIAL

## 2023-07-20 DIAGNOSIS — M54.32 SCIATICA OF LEFT SIDE: ICD-10-CM

## 2023-07-20 DIAGNOSIS — M54.16 LUMBAR RADICULOPATHY: ICD-10-CM

## 2023-07-20 DIAGNOSIS — M54.42 CHRONIC MIDLINE LOW BACK PAIN WITH LEFT-SIDED SCIATICA: Primary | ICD-10-CM

## 2023-07-20 DIAGNOSIS — G89.29 CHRONIC MIDLINE LOW BACK PAIN WITH LEFT-SIDED SCIATICA: Primary | ICD-10-CM

## 2023-07-20 PROCEDURE — 97140 MANUAL THERAPY 1/> REGIONS: CPT | Performed by: PHYSICAL THERAPIST

## 2023-07-20 PROCEDURE — 97110 THERAPEUTIC EXERCISES: CPT | Performed by: PHYSICAL THERAPIST

## 2023-07-20 NOTE — PROGRESS NOTES
Daily Note     Today's date: 2023  Patient name: Jeannie Ruano  : 1990  MRN: [id number]  Referring provider: Leo Lerma MD  Dx:   Encounter Diagnosis     ICD-10-CM    1. Chronic midline low back pain with left-sided sciatica  M54.42     G89.29       2. Lumbar radiculopathy  M54.16       3. Sciatica of left side  M54.32                      Subjective:  Pt reports feeling pretty beat up today. Pt explains yesterday, he felt the best he has felt in the past 4-weeks since the flare up of his Sx. The patient was able to complete a 14-hour day that he explains he can never handle due to the pain. Pt reports doing the piriformis stretch and rolling at home and reports it temporarily feels better after, but quickly comes back. Following the shift the patient is experiencing more pain and even reports pain going into the R leg. CC of today is tightness in calf and numbness into L foot. Objective: See treatment diary below      Assessment: Tolerated treatment well as demonstrated by improved symptoms following piriformis soft tissue and stretching. Patient reported pain going down the L leg dropping from 7/10 to 3/10 and continued to feel better after each additional soft tissue repetition; in addition, patient was able to achieve more ROM with stretches and sit with more comfort than prior to the session. Patient would benefit from continued PT to address piriformis tightness and associated symptoms in order to provide relief and a comprehensive HEP to carry-over to in between sessions. Plan: Continue per plan of care. Progress treatment as tolerated.        Precautions:   Past Medical History:   Diagnosis Date   • Asthma    • Carpal tunnel syndrome     Right - Last Assessed: 2016    • Exercise-induced asthma     Last Assessed: 2014           Manuals 23 7/6         ARANZA Goodwin              Neural elsa              Piriformis manual Roller + soft tissue AG and BR Roller BR                        Neuro Re-Ed             SLS              TA activation                                                                               Ther Ex             Hamstring stretch   W/ green band and cross midline (5x10")           Lumbar flexion     HEP         Gatroc stretch             Bird Dogs              Squats              Leg Press             Child's Pose 3-ways             Seated Figure 4 Stretch 10x10"; supine figure 4 stretch through ER/IR 10x10"  10x10"; knee to opposite shoulder 5x10'          LS Progression (Lima)  Supine LS flex + ext; Seated LS flex + ext; standing flex + ext; standing lateral lean with flexion  Standing L flex 2x10; lateral lean at wall 2x10; lateral lean + ext 2x10; L/S flex + rotation w/ PT OP                       Assessment & Management Emphasis on stretching and following it with rolling out at home to sufficiently dose and carry-over to in bt session Emphasis on piriformis extensibility and tightness contributing to compression of sciatic nerve and related Sx Emphasis on flexion and rotation with repeated motion to look for centralization. POC with emphasis on directional preference and ROM recovery. Ther Activity                                       Gait Training                                       Modalities                                       Patient treated by MELVINA Gil under my direct supervision.

## 2023-07-24 ENCOUNTER — OFFICE VISIT (OUTPATIENT)
Dept: PHYSICAL THERAPY | Facility: CLINIC | Age: 33
End: 2023-07-24
Payer: COMMERCIAL

## 2023-07-24 DIAGNOSIS — G89.29 CHRONIC MIDLINE LOW BACK PAIN WITH LEFT-SIDED SCIATICA: Primary | ICD-10-CM

## 2023-07-24 DIAGNOSIS — M54.42 CHRONIC MIDLINE LOW BACK PAIN WITH LEFT-SIDED SCIATICA: Primary | ICD-10-CM

## 2023-07-24 DIAGNOSIS — M54.16 LUMBAR RADICULOPATHY: ICD-10-CM

## 2023-07-24 DIAGNOSIS — M54.32 SCIATICA OF LEFT SIDE: ICD-10-CM

## 2023-07-24 PROCEDURE — 97140 MANUAL THERAPY 1/> REGIONS: CPT

## 2023-07-24 PROCEDURE — 97110 THERAPEUTIC EXERCISES: CPT

## 2023-07-24 PROCEDURE — 97112 NEUROMUSCULAR REEDUCATION: CPT

## 2023-07-24 NOTE — PROGRESS NOTES
Daily Note     Today's date: 2023  Patient name: Toni Rdz  : 1990  MRN: [id number]  Referring provider: Brooke Lloyd MD  Dx:   Encounter Diagnosis     ICD-10-CM    1. Chronic midline low back pain with left-sided sciatica  M54.42     G89.29       2. Lumbar radiculopathy  M54.16       3. Sciatica of left side  M54.32           Start Time: 1445  Stop Time: 1532  Total time in clinic (min): 47 minutes    Subjective: Patient reports feeling good today. Pt has been consistently performing piriformis rolling, ST mobility, and stretching in between sessions. Pt explains he does not locate a specific tight spot in his L hip while rolling out anymore and has minimal discomfort with rolling out. Pt reports minimal to no calf tightness and reports less pain down the L LE since the last session, especially comparing it to when he first started PT. Pt CC today is the numbness/tingling in his L foot. Objective: See treatment diary below      Assessment: Tolerated and responded to treatment well as demonstrated by less pain/tingling down the leg following piriformis rolling and soft tissue mobility. In addition, patient reported less numbness/tingling in his L foot following neural glides/tensioners. Therefore, therapy interventions and HEP focused on maintaining muscle extensibility and reducing neural tension. The patient would benefit from continued PT to progress muscle and nerve mobility to ease Sx followed by functional task/occupational task training to promote carry-over between sessions. Plan: Continue per plan of care. Progress treatment as tolerated.        Precautions:   Past Medical History:   Diagnosis Date   • Asthma    • Carpal tunnel syndrome     Right - Last Assessed: 2016    • Exercise-induced asthma     Last Assessed: 2014           Manuals 23 7/       PA Glides             Neural glides             Piriformis manual Roller + Soft tissue AG Roller + soft tissue AG and BR Roller BR                     Neuro Re-Ed            SLS             TA activation             Tensioner  Sitting EOM Sciatic nerve 10x; Sciatic Nerve tensioner (supine) 2x10                                                           Ther Ex            Hamstring stretch  In sitting 2x10"  W/ green band and cross midline (5x10")         Lumbar flexion      HEP       Gatroc stretch Long sitting w/ greenband 2x10"           Bird Dogs             Squats             Leg Press            Child's Pose 3-ways            Seated Figure 4 Stretch 5x10"; supine figure 4 stretch 4x30s 10x10"; supine figure 4 stretch through ER/IR 10x10"  10x10"; knee to opposite shoulder 5x10'        LS Progression (Lima)   Supine LS flex + ext; Seated LS flex + ext; standing flex + ext; standing lateral lean with flexion  Standing L flex 2x10; lateral lean at wall 2x10; lateral lean + ext 2x10; L/S flex + rotation w/ PT OP                    Assessment & Management  Emphasis on stretching and following it with rolling out at home to sufficiently dose and carry-over to in bt session Emphasis on piriformis extensibility and tightness contributing to compression of sciatic nerve and related Sx Emphasis on flexion and rotation with repeated motion to look for centralization. POC with emphasis on directional preference and ROM recovery. Ther Activity                                    Gait Training                                    Modalities                                    Patient treated by MELVINA Dumont under my direct supervision.

## 2023-07-25 ENCOUNTER — APPOINTMENT (OUTPATIENT)
Dept: PHYSICAL THERAPY | Facility: CLINIC | Age: 33
End: 2023-07-25
Payer: COMMERCIAL

## 2023-07-25 DIAGNOSIS — F41.9 ANXIETY: ICD-10-CM

## 2023-07-25 RX ORDER — ALPRAZOLAM 0.25 MG/1
0.25 TABLET ORAL 2 TIMES DAILY PRN
Qty: 60 TABLET | Refills: 0 | Status: SHIPPED | OUTPATIENT
Start: 2023-07-25

## 2023-07-27 ENCOUNTER — APPOINTMENT (OUTPATIENT)
Dept: PHYSICAL THERAPY | Facility: CLINIC | Age: 33
End: 2023-07-27
Payer: COMMERCIAL

## 2023-08-01 ENCOUNTER — OFFICE VISIT (OUTPATIENT)
Dept: PHYSICAL THERAPY | Facility: CLINIC | Age: 33
End: 2023-08-01
Payer: COMMERCIAL

## 2023-08-01 DIAGNOSIS — M54.16 LUMBAR RADICULOPATHY: ICD-10-CM

## 2023-08-01 DIAGNOSIS — M54.42 CHRONIC MIDLINE LOW BACK PAIN WITH LEFT-SIDED SCIATICA: Primary | ICD-10-CM

## 2023-08-01 DIAGNOSIS — G89.29 CHRONIC MIDLINE LOW BACK PAIN WITH LEFT-SIDED SCIATICA: Primary | ICD-10-CM

## 2023-08-01 DIAGNOSIS — M54.32 SCIATICA OF LEFT SIDE: ICD-10-CM

## 2023-08-01 PROCEDURE — 97110 THERAPEUTIC EXERCISES: CPT

## 2023-08-01 NOTE — PROGRESS NOTES
Daily Note     Today's date: 2023  Patient name: Beulah Bowles  : 1990  MRN: [id number]  Referring provider: Ernesto Biswas MD  Dx:   Encounter Diagnosis     ICD-10-CM    1. Chronic midline low back pain with left-sided sciatica  M54.42     G89.29       2. Lumbar radiculopathy  M54.16       3. Sciatica of left side  M54.32           Start Time: 1615  Stop Time: 1702  Total time in clinic (min): 47 minutes    Subjective: Patient reports he has been okay. The patient explains the pain has gotten more intense in the hip with continued tightness in the calf and slight numbness in foot. Patient reports that the neural glides and piriformis rolling at home have not been providing relief. Patient's current CC includes intense ache in the hip, tightness in the calf, and numbness in the foot-- the pain in the hip is most intense during STS and ~30s after standing up. Of note, the patient explains he feels less pain in the morning. Objective: See treatment diary below      Assessment: Tolerated treatment fair as demonstrated by relief of hip ache and calf pain following repeated standing lumbar extension motions; the pain reduced in the hip, but numbness and tightness remained in the foot and calf. The pain was not completely abolished with repeated standing lumbar extensions. The patient reported minimal to no relief following deep glute mobilization and piriformis stretching. The patient continues to experience pain at home, with occupational tasks, with prolonged sitting, and after standing for several seconds. Patient would benefit from continued PT in order to explore lumbar involvement potentially contributing to residual hip/calf pain. Plan: Continue per plan of care. Progress treatment as tolerated.        Precautions:   Past Medical History:   Diagnosis Date   • Asthma    • Carpal tunnel syndrome     Right - Last Assessed: 2016    • Exercise-induced asthma     Last Assessed: September 16, 2014     HEP:   Updated on 8/1/23- Repeated standing lumbar extensions every hour       Manuals 8/1/23 7/24/23 7/20/23 7/18/23 7/13/23 7/6   PA Glides          Neural glides          Piriformis manual Roller + Soft tissue AG Roller + Soft tissue AG Roller + soft tissue AG and BR Roller BR              Neuro Re-Ed         SLS          TA activation          Tensioner   Sitting EOM Sciatic nerve 10x; Sciatic Nerve tensioner (supine) 2x10                                           Ther Ex         Hamstring stretch   In sitting 2x10"  W/ green band and cross midline (5x10")     Lumbar flexion       HEP   Gatroc stretch  Long sitting w/ greenband 2x10"       Bird Dogs          Squats          Leg Press         Child's Pose 3-ways         Seated Figure 4 Stretch 5x10"  5x10"; supine figure 4 stretch 4x30s 10x10"; supine figure 4 stretch through ER/IR 10x10"  10x10"; knee to opposite shoulder 5x10'    LS Progression (Lima) Lumbar extension 3x10 (centralized)    Supine LS flex + ext; Seated LS flex + ext; standing flex + ext; standing lateral lean with flexion  Standing L flex 2x10; lateral lean at wall 2x10; lateral lean + ext 2x10; L/S flex + rotation w/ PT OP             Assessment & Management Provided education on spinal anatomy and role of repeated motions to alleviate nerve compression  Emphasis on stretching and following it with rolling out at home to sufficiently dose and carry-over to in bt session Emphasis on piriformis extensibility and tightness contributing to compression of sciatic nerve and related Sx Emphasis on flexion and rotation with repeated motion to look for centralization. POC with emphasis on directional preference and ROM recovery. Ther Activity                           Gait Training                           Modalities                           Patient treated by Liam Becerra, MELVINA under my direct supervision.

## 2023-08-03 ENCOUNTER — APPOINTMENT (OUTPATIENT)
Dept: PHYSICAL THERAPY | Facility: CLINIC | Age: 33
End: 2023-08-03
Payer: COMMERCIAL

## 2023-08-03 NOTE — PROGRESS NOTES
Daily Note     Today's date: 8/3/2023  Patient name: Jose Cifuentes  : 1990  MRN: [id number]  Referring provider: Billy Hamm MD  Dx: No diagnosis found. Subjective: Patient reports ***. Since last session***. CC is ***. Baseline walking in***      Objective: See treatment diary below      Assessment: Tolerated treatment {Tolerated treatment :0191689768}.  Patient {assessment:4161842124}      Plan: {PLAN:2480906542}     Precautions:   Past Medical History:   Diagnosis Date   • Asthma    • Carpal tunnel syndrome     Right - Last Assessed: 2016    • Exercise-induced asthma     Last Assessed: 2014     HEP:   Updated on 23- Repeated standing lumbar extensions every hour       Manuals 23 7   PA Glides          Neural glides          Piriformis manual Roller + Soft tissue AG Roller + Soft tissue AG Roller + soft tissue AG and BR Roller BR              Neuro Re-Ed         SLS          TA activation          Tensioner   Sitting EOM Sciatic nerve 10x; Sciatic Nerve tensioner (supine) 2x10                                           Ther Ex         Hamstring stretch   In sitting 2x10"  W/ green band and cross midline (5x10")     Lumbar flexion       HEP   Gatroc stretch  Long sitting w/ greenband 2x10"       Bird Dogs          Squats          Leg Press         Child's Pose 3-ways         Seated Figure 4 Stretch 5x10"  5x10"; supine figure 4 stretch 4x30s 10x10"; supine figure 4 stretch through ER/IR 10x10"  10x10"; knee to opposite shoulder 5x10'    LS Progression ([place]) Lumbar extension 3x10 (centralized)    Supine LS flex + ext; Seated LS flex + ext; standing flex + ext; standing lateral lean with flexion  Standing L flex 2x10; lateral lean at wall 2x10; lateral lean + ext 2x10; L/S flex + rotation w/ PT OP             Assessment & Management Provided education on spinal anatomy and role of repeated motions to alleviate nerve compression  Emphasis on stretching and following it with rolling out at home to sufficiently dose and carry-over to in bt session Emphasis on piriformis extensibility and tightness contributing to compression of sciatic nerve and related Sx Emphasis on flexion and rotation with repeated motion to look for centralization. POC with emphasis on directional preference and ROM recovery. Ther Activity                           Gait Training                           Modalities                           Patient treated by MELVINA Scott under my direct supervision.

## 2023-08-07 ENCOUNTER — OFFICE VISIT (OUTPATIENT)
Dept: PHYSICAL THERAPY | Facility: CLINIC | Age: 33
End: 2023-08-07
Payer: COMMERCIAL

## 2023-08-07 DIAGNOSIS — M54.16 LUMBAR RADICULOPATHY: ICD-10-CM

## 2023-08-07 DIAGNOSIS — G89.29 CHRONIC MIDLINE LOW BACK PAIN WITH LEFT-SIDED SCIATICA: Primary | ICD-10-CM

## 2023-08-07 DIAGNOSIS — M54.42 CHRONIC MIDLINE LOW BACK PAIN WITH LEFT-SIDED SCIATICA: Primary | ICD-10-CM

## 2023-08-07 DIAGNOSIS — M54.32 SCIATICA OF LEFT SIDE: ICD-10-CM

## 2023-08-07 PROCEDURE — 97110 THERAPEUTIC EXERCISES: CPT

## 2023-08-07 NOTE — PROGRESS NOTES
Daily Note     Today's date: 2023  Patient name: Nathaniel Milton  : 1990  MRN: [id number]  Referring provider: Shaneka Hidalgo MD  Dx:   Encounter Diagnosis     ICD-10-CM    1. Chronic midline low back pain with left-sided sciatica  M54.42     G89.29       2. Lumbar radiculopathy  M54.16       3. Sciatica of left side  M54.32                      Subjective: ***      Objective: See treatment diary below      Assessment: Tolerated treatment {Tolerated treatment :8218072782}.  Patient {assessment:4080223894}      Plan: {PLAN:1570736610}     Precautions:   Past Medical History:   Diagnosis Date   • Asthma    • Carpal tunnel syndrome     Right - Last Assessed: 2016    • Exercise-induced asthma     Last Assessed: 2014     HEP:   Updated on 23- Repeated standing lumbar extensions every hour       Manuals 23   PA Glides          Neural glides          Piriformis manual  Roller + Soft tissue AG Roller + Soft tissue AG Roller + soft tissue AG and BR Roller BR             Neuro Re-Ed         SLS          TA activation          Tensioner    Sitting EOM Sciatic nerve 10x; Sciatic Nerve tensioner (supine) 2x10                                          Ther Ex         Hamstring stretch    In sitting 2x10"  W/ green band and cross midline (5x10")    Lumbar flexion          Gatroc stretch   Long sitting w/ greenband 2x10"      Bird Dogs          Squats          Leg Press         Child's Pose 3-ways         Seated Figure 4 Stretch  5x10"  5x10"; supine figure 4 stretch 4x30s 10x10"; supine figure 4 stretch through ER/IR 10x10"  10x10"; knee to opposite shoulder 5x10'   LS Progression ([place]ima)  Lumbar extension 3x10 (centralized)    Supine LS flex + ext; Seated LS flex + ext; standing flex + ext; standing lateral lean with flexion  Standing L flex 2x10; lateral lean at wall 2x10; lateral lean + ext 2x10; L/S flex + rotation w/ PT OP Assessment & Management  Provided education on spinal anatomy and role of repeated motions to alleviate nerve compression  Emphasis on stretching and following it with rolling out at home to sufficiently dose and carry-over to in bt session Emphasis on piriformis extensibility and tightness contributing to compression of sciatic nerve and related Sx Emphasis on flexion and rotation with repeated motion to look for centralization. Ther Activity                           Gait Training                           Modalities                           Patient treated by MELVINA Bowers under my direct supervision.

## 2023-08-07 NOTE — PROGRESS NOTES
Daily Note     Today's date: 2023  Patient name: Ok Cleary  : 1990  MRN: [id number]  Referring provider: Bill Linares MD  Dx:   Encounter Diagnosis     ICD-10-CM    1. Chronic midline low back pain with left-sided sciatica  M54.42     G89.29       2. Lumbar radiculopathy  M54.16       3. Sciatica of left side  M54.32           Start Time: 1704  Stop Time: 1750  Total time in clinic (min): 46 minutes    Subjective: Patient reports minimal to no change following the last session and after the repeated standing extension. The patient feels less numbness/tingling into the foot and cramping in the calf. The patient continues to have significant pain in the left hip and low back. The patient reports that when he turns his head to the L, like during driving, he feels a jolt down into the L leg. Objective: See treatment diary below  Reflexes:   Patellar  L: 2+  R:2+  Achilles:   L: 2+  R:2+  Biceps:  L: 1+  R: 1+  Triceps:  L: 1+  R: 1+  Brachioradialis:   L: 1+  R: 1+  Heard's   L: (-)  R: (-)    Assessment: Tolerated treatment fair as demonstrated by no significant changes in symptoms following lumbar back extensions with changes in force and positions. The patient continued to feel pain in the left hip and low back throughout the session. The patient reported reproduction of the pain with cervical rotation (more notably to the left). This is approaching the 5th week of PT services without substantial progress. The patient's current condition has proven to not be responsive, for long periods of time, to PT interventions including repeated attempts at various lumbar back range of motions, neural tension, and soft-tissue mobility. At this point, the patient was provided a core stabilization HEP to complete in the next week to assess if that relieves any back/hip pain.  The patient was informed of not formally continuing PT services until further exploration by PCP and potentially a spine specialist. The patient denies any BB changes, numbness/tingling down the arms, and paresthesia in the saddle region consistent with cervical myelopathy and demonstrated WNL reflexes at today's assessment. However, at today's session the patient reports very infrequent numbness in shoulder/down the arm and one time where arms and hand were tingling/numb; therefore, the previous information and failed attempt at conservative management suggests the patient would benefit from imaging or appointment with PCP to conclude no serious pathology and next-steps. The patient was informed on next steps and demonstrated understanding with all patient concerns/questions answered at this time. Plan: Potential discharge next visit.      Precautions:   Past Medical History:   Diagnosis Date   • Asthma    • Carpal tunnel syndrome     Right - Last Assessed: April 4, 2016    • Exercise-induced asthma     Last Assessed: September 16, 2014     HEP:   Updated on 8/1/23- Repeated standing lumbar extensions every hour   Access Code: [email or web address]       Manuals 8/7/23 8/1/23 7/24/23 7/20/23 7/18/23   PA Glides         Neural glides         Piriformis manual  Roller + Soft tissue AG Roller + Soft tissue AG Roller + soft tissue AG and BR Roller BR           Neuro Re-Ed        SLS         TA activation         Tensioner    Sitting EOM Sciatic nerve 10x; Sciatic Nerve tensioner (supine) 2x10                                     Ther Ex        Bridges  10x       Hamstring stretch    In sitting 2x10"  W/ green band and cross midline (5x10")   Lumbar flexion         Gatroc stretch   Long sitting w/ greenband 2x10"     Dead bugs  10x       Bird Dogs  10x       Squats         Leg Press        Child's Pose 3-ways        Seated Figure 4 Stretch  5x10"  5x10"; supine figure 4 stretch 4x30s 10x10"; supine figure 4 stretch through ER/IR 10x10"    LS Progression ([place]) Lumbar extension standing 3x10 (no change); prone press up 10x (no change): prone press up w/ PT OP 10x (no change); prone press up with lateral shift 10x (no change) Lumbar extension 3x10 (centralized)    Supine LS flex + ext; Seated LS flex + ext; standing flex + ext; standing lateral lean with flexion            Assessment & Management Provided education on discontinueing formal PT at this time, HEP for core stabilization, and scheduling appointment with PCP to discuss potential imaging/next steps  Provided education on spinal anatomy and role of repeated motions to alleviate nerve compression  Emphasis on stretching and following it with rolling out at home to sufficiently dose and carry-over to in bt session Emphasis on piriformis extensibility and tightness contributing to compression of sciatic nerve and related Sx   Ther Activity                        Gait Training                        Modalities                        Patient treated by MELVINA Cho under my direct supervision.

## 2023-08-10 DIAGNOSIS — G89.29 CHRONIC MIDLINE LOW BACK PAIN WITH LEFT-SIDED SCIATICA: Primary | ICD-10-CM

## 2023-08-10 DIAGNOSIS — M54.42 CHRONIC MIDLINE LOW BACK PAIN WITH LEFT-SIDED SCIATICA: Primary | ICD-10-CM

## 2023-09-01 ENCOUNTER — HOSPITAL ENCOUNTER (OUTPATIENT)
Dept: RADIOLOGY | Facility: HOSPITAL | Age: 33
Discharge: HOME | End: 2023-09-01
Payer: COMMERCIAL

## 2023-09-01 DIAGNOSIS — M54.42 CHRONIC MIDLINE LOW BACK PAIN WITH LEFT-SIDED SCIATICA: ICD-10-CM

## 2023-09-01 DIAGNOSIS — G89.29 CHRONIC MIDLINE LOW BACK PAIN WITH LEFT-SIDED SCIATICA: ICD-10-CM

## 2023-09-01 PROCEDURE — G1004 CDSM NDSC: HCPCS

## 2023-09-01 PROCEDURE — 72148 MRI LUMBAR SPINE W/O DYE: CPT

## 2023-09-12 ENCOUNTER — OFFICE VISIT (OUTPATIENT)
Dept: FAMILY MEDICINE CLINIC | Facility: CLINIC | Age: 33
End: 2023-09-12
Payer: COMMERCIAL

## 2023-09-12 VITALS
DIASTOLIC BLOOD PRESSURE: 82 MMHG | WEIGHT: 249 LBS | HEART RATE: 64 BPM | RESPIRATION RATE: 16 BRPM | HEIGHT: 71 IN | TEMPERATURE: 97.7 F | BODY MASS INDEX: 34.86 KG/M2 | SYSTOLIC BLOOD PRESSURE: 118 MMHG

## 2023-09-12 DIAGNOSIS — M54.42 CHRONIC MIDLINE LOW BACK PAIN WITH LEFT-SIDED SCIATICA: Primary | ICD-10-CM

## 2023-09-12 DIAGNOSIS — M51.37 DEGENERATIVE DISC DISEASE AT L5-S1 LEVEL: ICD-10-CM

## 2023-09-12 DIAGNOSIS — F41.9 ANXIETY: ICD-10-CM

## 2023-09-12 DIAGNOSIS — G89.29 CHRONIC MIDLINE LOW BACK PAIN WITH LEFT-SIDED SCIATICA: Primary | ICD-10-CM

## 2023-09-12 PROCEDURE — 99213 OFFICE O/P EST LOW 20 MIN: CPT | Performed by: FAMILY MEDICINE

## 2023-09-12 RX ORDER — ALPRAZOLAM 0.25 MG/1
0.25 TABLET ORAL 2 TIMES DAILY PRN
Qty: 60 TABLET | Refills: 0 | Status: SHIPPED | OUTPATIENT
Start: 2023-09-12

## 2023-09-12 NOTE — PROGRESS NOTES
Name: Papa Mann      : 1990      MRN: [id number]  Encounter Provider: Alvarez Knight MD  Encounter Date: 2023   Encounter department: [facility]     1. Chronic midline low back pain with left-sided sciatica  -     Ambulatory Referral to Neurosurgery; Future    2. Degenerative disc disease at L5-S1 level  -     Ambulatory Referral to Neurosurgery; Future    3. Anxiety  -     ALPRAZolam (XANAX) 0.25 mg tablet; Take 1 tablet (0.25 mg total) by mouth 2 (two) times a day as needed for anxiety           Subjective      MRI REVIEW    DISCUSSED FINDINGS  CONTINUED PAIN  PT NOT HELPING MUCH    REVIEWED THERAPEUTIC PLAN    Review of Systems   Constitutional: Negative for chills, fatigue and fever. HENT: Negative for sore throat. Eyes: Negative for discharge. Respiratory: Negative for cough and chest tightness. Cardiovascular: Negative for chest pain and palpitations. Gastrointestinal: Negative for abdominal pain, diarrhea, nausea and vomiting. Musculoskeletal: Positive for arthralgias and back pain. Negative for gait problem. Neurological: Negative for dizziness, weakness and headaches. Hematological: Negative for adenopathy. Psychiatric/Behavioral: The patient is not nervous/anxious.         Current Outpatient Medications on File Prior to Visit   Medication Sig   • albuterol (PROVENTIL HFA,VENTOLIN HFA) 90 mcg/act inhaler Inhale 2 puffs every 4 (four) hours as needed for wheezing   • fluticasone (FLONASE) 50 mcg/act nasal spray 1 spray into each nostril daily   • Zubsolv 2.9-0.71 MG SUBL in the morning   • [DISCONTINUED] ALPRAZolam (XANAX) 0.25 mg tablet Take 1 tablet (0.25 mg total) by mouth 2 (two) times a day as needed for anxiety   • [DISCONTINUED] predniSONE 20 mg tablet 2 PO QD X 4 DAYS, THEN 1 PO QD X 4 DAYS, THEN 1/2 PO QD X 4 DAYS       Objective     /82 (BP Location: Left arm, Patient Position: Sitting, Cuff Size: Large)   Pulse 64   Temp 97.7 °F (36.5 °C) (Temporal)   Resp 16   Ht 5' 11" (1.803 m)   Wt 113 kg (249 lb)   BMI 34.73 kg/m²     Physical Exam     NO PE WAS PERFORMED    LENGTH OF VISIT 15 MIN  LENGTH OF  15 MIN  Jesus England MD

## 2023-09-18 ENCOUNTER — OFFICE VISIT (OUTPATIENT)
Dept: FAMILY MEDICINE CLINIC | Facility: CLINIC | Age: 33
End: 2023-09-18
Payer: COMMERCIAL

## 2023-09-18 VITALS
DIASTOLIC BLOOD PRESSURE: 66 MMHG | RESPIRATION RATE: 12 BRPM | SYSTOLIC BLOOD PRESSURE: 120 MMHG | OXYGEN SATURATION: 97 % | HEIGHT: 71 IN | HEART RATE: 72 BPM | TEMPERATURE: 97 F | BODY MASS INDEX: 34.58 KG/M2 | WEIGHT: 247 LBS

## 2023-09-18 DIAGNOSIS — J02.9 VIRAL PHARYNGITIS: Primary | ICD-10-CM

## 2023-09-18 DIAGNOSIS — J02.9 SORE THROAT: ICD-10-CM

## 2023-09-18 LAB — S PYO AG THROAT QL: NEGATIVE

## 2023-09-18 PROCEDURE — 87880 STREP A ASSAY W/OPTIC: CPT | Performed by: NURSE PRACTITIONER

## 2023-09-18 PROCEDURE — 87635 SARS-COV-2 COVID-19 AMP PRB: CPT | Performed by: NURSE PRACTITIONER

## 2023-09-18 PROCEDURE — 99213 OFFICE O/P EST LOW 20 MIN: CPT | Performed by: NURSE PRACTITIONER

## 2023-09-18 RX ORDER — PREDNISONE 10 MG/1
10 TABLET ORAL 2 TIMES DAILY WITH MEALS
Qty: 8 TABLET | Refills: 0 | Status: SHIPPED | OUTPATIENT
Start: 2023-09-18 | End: 2023-09-22

## 2023-09-18 NOTE — PROGRESS NOTES
Assessment/Plan:    1. Viral pharyngitis  Comments:  Encourage supportive measures only at this time. If symptoms persist or worsen, RTO or call office with update. 2. Sore throat  -     predniSONE 10 mg tablet; Take 1 tablet (10 mg total) by mouth 2 (two) times a day with meals for 4 days  -     COVID Only- Office Collect  -     Throat culture  -     POCT rapid strepA            Return if symptoms worsen or fail to improve. Subjective:      Patient ID: Earney Seip is a 29 y.o. male. Chief Complaint   Patient presents with   • Cold Like Symptoms       Sore Throat   This is a new problem. The current episode started yesterday. The problem has been unchanged. There has been no fever. The pain is severe. Pertinent negatives include no congestion, coughing, diarrhea, ear discharge, ear pain, headaches, plugged ear sensation, shortness of breath or vomiting. He has tried nothing for the symptoms. The treatment provided no relief. The following portions of the patient's history were reviewed and updated as appropriate: allergies, current medications, past family history, past medical history, past social history, past surgical history and problem list.    Review of Systems   Constitutional: Negative for chills, diaphoresis, fatigue and fever. HENT: Positive for sore throat. Negative for congestion, ear discharge, ear pain, postnasal drip, rhinorrhea, sinus pressure and sinus pain. Eyes: Negative for pain and discharge. Respiratory: Negative for cough, chest tightness, shortness of breath and wheezing. Cardiovascular: Negative for chest pain. Gastrointestinal: Negative for diarrhea, nausea and vomiting. Musculoskeletal: Negative for myalgias. Neurological: Negative for dizziness and headaches. Hematological: Negative for adenopathy.          Current Outpatient Medications   Medication Sig Dispense Refill   • albuterol (PROVENTIL HFA,VENTOLIN HFA) 90 mcg/act inhaler Inhale 2 puffs every 4 (four) hours as needed for wheezing 8.5 g 1   • ALPRAZolam (XANAX) 0.25 mg tablet Take 1 tablet (0.25 mg total) by mouth 2 (two) times a day as needed for anxiety 60 tablet 0   • fluticasone (FLONASE) 50 mcg/act nasal spray 1 spray into each nostril daily     • predniSONE 10 mg tablet Take 1 tablet (10 mg total) by mouth 2 (two) times a day with meals for 4 days 8 tablet 0   • Zubsolv 2.9-0.71 MG SUBL in the morning       No current facility-administered medications for this visit. Objective:    /66 (BP Location: Left arm, Patient Position: Sitting, Cuff Size: Large)   Pulse 72   Temp (!) 97 °F (36.1 °C) (Temporal)   Resp 12   Ht 5' 11" (1.803 m)   Wt 112 kg (247 lb)   SpO2 97%   BMI 34.45 kg/m²        Physical Exam  Vitals reviewed. Constitutional:       General: He is not in acute distress. Appearance: Normal appearance. He is well-developed. He is not diaphoretic. HENT:      Head: Normocephalic and atraumatic. Right Ear: Ear canal and external ear normal. No drainage, swelling or tenderness. No middle ear effusion. Tympanic membrane is erythematous. Left Ear: Tympanic membrane, ear canal and external ear normal. No drainage, swelling or tenderness. No middle ear effusion. Nose: No mucosal edema or rhinorrhea. Right Sinus: No maxillary sinus tenderness or frontal sinus tenderness. Left Sinus: No maxillary sinus tenderness or frontal sinus tenderness. Mouth/Throat:      Pharynx: Uvula midline. Posterior oropharyngeal erythema (mild) present. No oropharyngeal exudate. Tonsils: 1+ on the left. Eyes:      General:         Right eye: No discharge. Left eye: No discharge. Conjunctiva/sclera: Conjunctivae normal.   Neck:      Thyroid: No thyromegaly. Cardiovascular:      Rate and Rhythm: Normal rate and regular rhythm. Heart sounds: Normal heart sounds.    Pulmonary:      Effort: Pulmonary effort is normal. No respiratory distress. Breath sounds: Normal breath sounds. No decreased breath sounds, wheezing, rhonchi or rales. Musculoskeletal:      Cervical back: Normal range of motion and neck supple. Lymphadenopathy:      Cervical: No cervical adenopathy. Skin:     General: Skin is warm and dry. Findings: No rash. Neurological:      Mental Status: He is alert and oriented to person, place, and time. Psychiatric:         Behavior: Behavior normal.         Thought Content:  Thought content normal.                GLORIA James

## 2023-09-19 LAB — SARS-COV-2 RNA RESP QL NAA+PROBE: NEGATIVE

## 2023-09-21 LAB — B-HEM STREP SPEC QL CULT: NEGATIVE

## 2023-10-10 ENCOUNTER — APPOINTMENT (EMERGENCY)
Dept: RADIOLOGY | Facility: HOSPITAL | Age: 33
End: 2023-10-10
Payer: COMMERCIAL

## 2023-10-10 ENCOUNTER — HOSPITAL ENCOUNTER (EMERGENCY)
Facility: HOSPITAL | Age: 33
Discharge: HOME | End: 2023-10-10
Attending: EMERGENCY MEDICINE | Admitting: EMERGENCY MEDICINE
Payer: COMMERCIAL

## 2023-10-10 VITALS
BODY MASS INDEX: 34.58 KG/M2 | RESPIRATION RATE: 18 BRPM | WEIGHT: 247 LBS | DIASTOLIC BLOOD PRESSURE: 76 MMHG | HEIGHT: 71 IN | HEART RATE: 76 BPM | SYSTOLIC BLOOD PRESSURE: 128 MMHG | OXYGEN SATURATION: 95 % | TEMPERATURE: 98.6 F

## 2023-10-10 DIAGNOSIS — R07.9 CHEST PAIN: Primary | ICD-10-CM

## 2023-10-10 LAB
ALBUMIN SERPL BCP-MCNC: 4.3 G/DL (ref 3.5–5)
ALP SERPL-CCNC: 44 U/L (ref 34–104)
ALT SERPL W P-5'-P-CCNC: 14 U/L (ref 7–52)
ANION GAP SERPL CALCULATED.3IONS-SCNC: 7 MMOL/L
AST SERPL W P-5'-P-CCNC: 18 U/L (ref 13–39)
BASOPHILS # BLD AUTO: 0.08 THOUSANDS/ÂΜL (ref 0–0.1)
BASOPHILS NFR BLD AUTO: 1 % (ref 0–1)
BILIRUB SERPL-MCNC: 0.76 MG/DL (ref 0.2–1)
BUN SERPL-MCNC: 12 MG/DL (ref 5–25)
CALCIUM SERPL-MCNC: 9.1 MG/DL (ref 8.4–10.2)
CARDIAC TROPONIN I PNL SERPL HS: <2 NG/L
CHLORIDE SERPL-SCNC: 103 MMOL/L (ref 96–108)
CO2 SERPL-SCNC: 26 MMOL/L (ref 21–32)
CREAT SERPL-MCNC: 0.85 MG/DL (ref 0.6–1.3)
EOSINOPHIL # BLD AUTO: 0.11 THOUSAND/ÂΜL (ref 0–0.61)
EOSINOPHIL NFR BLD AUTO: 2 % (ref 0–6)
ERYTHROCYTE [DISTWIDTH] IN BLOOD BY AUTOMATED COUNT: 12.5 % (ref 11.6–15.1)
GFR SERPL CREATININE-BSD FRML MDRD: 115 ML/MIN/1.73SQ M
GLUCOSE SERPL-MCNC: 118 MG/DL (ref 65–140)
HCT VFR BLD AUTO: 40.9 % (ref 36.5–49.3)
HGB BLD-MCNC: 13.6 G/DL (ref 12–17)
IMM GRANULOCYTES # BLD AUTO: 0.02 THOUSAND/UL (ref 0–0.2)
IMM GRANULOCYTES NFR BLD AUTO: 0 % (ref 0–2)
LYMPHOCYTES # BLD AUTO: 1.71 THOUSANDS/ÂΜL (ref 0.6–4.47)
LYMPHOCYTES NFR BLD AUTO: 25 % (ref 14–44)
MCH RBC QN AUTO: 26.8 PG (ref 26.8–34.3)
MCHC RBC AUTO-ENTMCNC: 33.3 G/DL (ref 31.4–37.4)
MCV RBC AUTO: 81 FL (ref 82–98)
MONOCYTES # BLD AUTO: 0.67 THOUSAND/ÂΜL (ref 0.17–1.22)
MONOCYTES NFR BLD AUTO: 10 % (ref 4–12)
NEUTROPHILS # BLD AUTO: 4.34 THOUSANDS/ÂΜL (ref 1.85–7.62)
NEUTS SEG NFR BLD AUTO: 62 % (ref 43–75)
NRBC BLD AUTO-RTO: 0 /100 WBCS
PLATELET # BLD AUTO: 193 THOUSANDS/UL (ref 149–390)
PMV BLD AUTO: 11 FL (ref 8.9–12.7)
POTASSIUM SERPL-SCNC: 3.3 MMOL/L (ref 3.5–5.3)
PROT SERPL-MCNC: 6.4 G/DL (ref 6.4–8.4)
RBC # BLD AUTO: 5.08 MILLION/UL (ref 3.88–5.62)
SODIUM SERPL-SCNC: 136 MMOL/L (ref 135–147)
WBC # BLD AUTO: 6.93 THOUSAND/UL (ref 4.31–10.16)

## 2023-10-10 PROCEDURE — 93005 ELECTROCARDIOGRAM TRACING: CPT

## 2023-10-10 PROCEDURE — 84484 ASSAY OF TROPONIN QUANT: CPT | Performed by: EMERGENCY MEDICINE

## 2023-10-10 PROCEDURE — 36415 COLL VENOUS BLD VENIPUNCTURE: CPT | Performed by: EMERGENCY MEDICINE

## 2023-10-10 PROCEDURE — 71045 X-RAY EXAM CHEST 1 VIEW: CPT

## 2023-10-10 PROCEDURE — 85025 COMPLETE CBC W/AUTO DIFF WBC: CPT | Performed by: EMERGENCY MEDICINE

## 2023-10-10 PROCEDURE — 99285 EMERGENCY DEPT VISIT HI MDM: CPT

## 2023-10-10 PROCEDURE — 99285 EMERGENCY DEPT VISIT HI MDM: CPT | Performed by: EMERGENCY MEDICINE

## 2023-10-10 PROCEDURE — 80053 COMPREHEN METABOLIC PANEL: CPT | Performed by: EMERGENCY MEDICINE

## 2023-10-10 NOTE — ED PROVIDER NOTES
History  Chief Complaint   Patient presents with    Chest Pain     Left side cp since 1pm, states no other symptoms      Patient presents for evaluation of left-sided chest pain since 1 PM.  Denies any shortness of breath. States he will intermittently get this symptom usually associated with him snorting/sniffling. Denies any modifying factors or pain. Describes it as a sharp stabbing pain. History provided by:  Patient   used: No        Prior to Admission Medications   Prescriptions Last Dose Informant Patient Reported? Taking? ALPRAZolam (XANAX) 0.25 mg tablet   No No   Sig: Take 1 tablet (0.25 mg total) by mouth 2 (two) times a day as needed for anxiety   Zubsolv 2.9-0.71 MG SUBL  Self Yes No   Sig: in the morning   albuterol (PROVENTIL HFA,VENTOLIN HFA) 90 mcg/act inhaler  Self No No   Sig: Inhale 2 puffs every 4 (four) hours as needed for wheezing   fluticasone (FLONASE) 50 mcg/act nasal spray  Self Yes No   Si spray into each nostril daily      Facility-Administered Medications: None       Past Medical History:   Diagnosis Date    Asthma     Carpal tunnel syndrome     Right - Last Assessed: 2016     Exercise-induced asthma     Last Assessed: 2014       Past Surgical History:   Procedure Laterality Date    HIP SURGERY         Family History   Problem Relation Age of Onset    Other Mother         Lymphedema     Substance Abuse Family     No Known Problems Father     No Known Problems Brother     Mental illness Neg Hx      I have reviewed and agree with the history as documented.     E-Cigarette/Vaping    E-Cigarette Use Former User      E-Cigarette/Vaping Substances    Nicotine No     THC No     CBD No     Flavoring Yes     Other No     Unknown No      Social History     Tobacco Use    Smoking status: Former    Smokeless tobacco: Former    Tobacco comments:     vaping daily since stopped smoking 3 yrs ago   Vaping Use    Vaping Use: Former    Substances: Flavoring   Substance Use Topics    Alcohol use: No    Drug use: No       Review of Systems   Respiratory:  Negative for shortness of breath. Cardiovascular:  Positive for chest pain. All other systems reviewed and are negative. Physical Exam  Physical Exam  Vitals and nursing note reviewed. Constitutional:       General: He is not in acute distress. Eyes:      General: No scleral icterus. Conjunctiva/sclera: Conjunctivae normal.   Cardiovascular:      Rate and Rhythm: Normal rate and regular rhythm. Pulmonary:      Effort: Pulmonary effort is normal. No respiratory distress. Breath sounds: Normal breath sounds. Abdominal:      Tenderness: There is no abdominal tenderness. Musculoskeletal:         General: No deformity. Normal range of motion. Right lower leg: No edema. Left lower leg: No edema. Skin:     Capillary Refill: Capillary refill takes less than 2 seconds. Findings: No rash. Neurological:      General: No focal deficit present. Mental Status: He is alert and oriented to person, place, and time.          Vital Signs  ED Triage Vitals   Temperature Pulse Respirations Blood Pressure SpO2   10/10/23 1632 10/10/23 1632 10/10/23 1632 10/10/23 1632 10/10/23 1632   98.6 °F (37 °C) 87 18 158/100 96 %      Temp src Heart Rate Source Patient Position - Orthostatic VS BP Location FiO2 (%)   -- 10/10/23 1730 10/10/23 1730 -- --    Monitor Sitting        Pain Score       10/10/23 1632       5           Vitals:    10/10/23 1632 10/10/23 1645 10/10/23 1651 10/10/23 1730   BP: 158/100 158/100 151/89 128/76   Pulse: 87 92 90 76   Patient Position - Orthostatic VS:    Sitting         Visual Acuity      ED Medications  Medications - No data to display    Diagnostic Studies  Results Reviewed       Procedure Component Value Units Date/Time    HS Troponin 0hr (reflex protocol) [[id number]]  (Normal) Collected: 10/10/23 1650    Lab Status: Final result Specimen: Blood from Arm, Left Updated: 10/10/23 1720     hs TnI 0hr <2 ng/L     Comprehensive metabolic panel [[id number]]  (Abnormal) Collected: 10/10/23 1650    Lab Status: Final result Specimen: Blood from Arm, Left Updated: 10/10/23 1715     Sodium 136 mmol/L      Potassium 3.3 mmol/L      Chloride 103 mmol/L      CO2 26 mmol/L      ANION GAP 7 mmol/L      BUN 12 mg/dL      Creatinine 0.85 mg/dL      Glucose 118 mg/dL      Calcium 9.1 mg/dL      AST 18 U/L      ALT 14 U/L      Alkaline Phosphatase 44 U/L      Total Protein 6.4 g/dL      Albumin 4.3 g/dL      Total Bilirubin 0.76 mg/dL      eGFR 115 ml/min/1.73sq m     Narrative:      National Kidney Disease Foundation guidelines for Chronic Kidney Disease (CKD):     Stage 1 with normal or high GFR (GFR > 90 mL/min/1.73 square meters)    Stage 2 Mild CKD (GFR = 60-89 mL/min/1.73 square meters)    Stage 3A Moderate CKD (GFR = 45-59 mL/min/1.73 square meters)    Stage 3B Moderate CKD (GFR = 30-44 mL/min/1.73 square meters)    Stage 4 Severe CKD (GFR = 15-29 mL/min/1.73 square meters)    Stage 5 End Stage CKD (GFR <15 mL/min/1.73 square meters)  Note: GFR calculation is accurate only with a steady state creatinine    CBC and differential [[id number]]  (Abnormal) Collected: 10/10/23 1650    Lab Status: Final result Specimen: Blood from Arm, Left Updated: 10/10/23 1657     WBC 6.93 Thousand/uL      RBC 5.08 Million/uL      Hemoglobin 13.6 g/dL      Hematocrit 40.9 %      MCV 81 fL      MCH 26.8 pg      MCHC 33.3 g/dL      RDW 12.5 %      MPV 11.0 fL      Platelets 201 Thousands/uL      nRBC 0 /100 WBCs      Neutrophils Relative 62 %      Immat GRANS % 0 %      Lymphocytes Relative 25 %      Monocytes Relative 10 %      Eosinophils Relative 2 %      Basophils Relative 1 %      Neutrophils Absolute 4.34 Thousands/µL      Immature Grans Absolute 0.02 Thousand/uL      Lymphocytes Absolute 1.71 Thousands/µL      Monocytes Absolute 0.67 Thousand/µL      Eosinophils Absolute 0.11 Thousand/µL Basophils Absolute 0.08 Thousands/µL                    XR chest 1 view portable   Final Result by Neal Wilkinson MD (10/10 2210)      No acute cardiopulmonary disease. Workstation performed: [id number]                    Procedures  ECG 12 Lead Documentation Only    Date/Time: 10/10/2023 4:37 PM    Performed by: Kurt Frazier DO  Authorized by: Kurt Frazier DO    ECG reviewed by me, the ED Provider: yes    Patient location:  ED  Interpretation:     Interpretation: normal    Rate:     ECG rate:  87    ECG rate assessment: normal    Rhythm:     Rhythm: sinus rhythm    Ectopy:     Ectopy: none    ST segments:     ST segments:  Normal  T waves:     T waves: normal             ED Course             HEART Risk Score      Flowsheet Row Most Recent Value   Heart Score Risk Calculator    History 0 Filed at: 10/10/2023 1735   ECG 0 Filed at: 10/10/2023 1735   Age 0 Filed at: 10/10/2023 1735   Risk Factors 1 Filed at: 10/10/2023 1735   Troponin 0 Filed at: 10/10/2023 1735   HEART Score 1 Filed at: 10/10/2023 1735                  [address] for PE      Flowsheet Row Most Recent Value   PERC Rule for PE    Age >=50 0 Filed at: 10/12/2023 0725   HR >=100 0 Filed at: 10/12/2023 0725   O2 Sat on room air < 95% 0 Filed at: 10/12/2023 0725   History of PE or DVT 0 Filed at: 10/12/2023 0725   Recent trauma or surgery 0 Filed at: 10/12/2023 0725   Hemoptysis 0 Filed at: 10/12/2023 0725   Exogenous estrogen 0 Filed at: 10/12/2023 0725   Unilateral leg swelling 0 Filed at: 10/12/2023 0725   [address] Rule for PE Results 0 Filed at: 10/12/2023 0725                SBIRT 22yo+      Flowsheet Row Most Recent Value   Initial Alcohol Screen: US AUDIT-C     1. How often do you have a drink containing alcohol? 0 Filed at: 10/10/2023 1636   2. How many drinks containing alcohol do you have on a typical day you are drinking? 0 Filed at: 10/10/2023 1636   3a. Male UNDER 65:  How often do you have five or more drinks on one occasion? 0 Filed at: 10/10/2023 1636   3b. FEMALE Any Age, or MALE 65+: How often do you have 4 or more drinks on one occassion? 0 Filed at: 10/10/2023 1636   Audit-C Score 0 Filed at: 10/10/2023 1636   HESHAM: How many times in the past year have you. .. Used an illegal drug or used a prescription medication for non-medical reasons? Never Filed at: 10/10/2023 1636            Wells' Criteria for PE      Flowsheet Row Most Recent Value   Wells' Criteria for PE    Clinical signs and symptoms of DVT 0 Filed at: 10/12/2023 2834   PE is primary diagnosis or equally likely 0 Filed at: 10/12/2023 0725   HR >100 0 Filed at: 10/12/2023 0725   Immobilization at least 3 days or Surgery in the previous 4 weeks 0 Filed at: 10/12/2023 0725   Previous, objectively diagnosed PE or DVT 0 Filed at: 10/12/2023 0725   Hemoptysis 0 Filed at: 10/12/2023 0725   Malignancy with treatment within 6 months or palliative 0 Filed at: 10/12/2023 0725   Wells' Criteria Total 0 Filed at: 10/12/2023 0725                  Medical Decision Making  Pulse ox 95% on room air indicating adequate oxygenation. CXR: NAD as read by me        Differential diagnosis include but not limited to ACS, arrhythmia, musculoskeletal pain, GERD    Amount and/or Complexity of Data Reviewed  Labs: ordered. Radiology: ordered and independent interpretation performed. ECG/medicine tests: ordered and independent interpretation performed. Disposition  Final diagnoses:   Chest pain     Time reflects when diagnosis was documented in both MDM as applicable and the Disposition within this note       Time User Action Codes Description Comment    10/10/2023  5:35 PM Niko Galvan Add [R07.9] Chest pain           ED Disposition       ED Disposition   Discharge    Condition   Stable    Date/Time   Tue [facility]                    Follow-up Information       Follow up With Specialties Details Why Contact Info Alonso Sexton MD Family Medicine In 1 week  [address] [facility]  [phone]              Discharge Medication List as of 10/10/2023  5:35 PM        CONTINUE these medications which have NOT CHANGED    Details   albuterol (PROVENTIL HFA,VENTOLIN HFA) 90 mcg/act inhaler Inhale 2 puffs every 4 (four) hours as needed for wheezing, Starting Wed 3/8/2023, Normal      ALPRAZolam (XANAX) 0.25 mg tablet Take 1 tablet (0.25 mg total) by mouth 2 (two) times a day as needed for anxiety, Starting Tue 9/12/2023, Normal      fluticasone (FLONASE) 50 mcg/act nasal spray 1 spray into each nostril daily, Starting Thu 5/7/2015, Historical Med      Zubsolv 2.9-0.71 MG SUBL in the morning, Starting Mon 10/3/2022, Historical Med             No discharge procedures on file.     PDMP Review         Value Time User    PDMP Reviewed  Yes 9/12/2023  4:06 PM Alonso Sexton MD            ED Provider  Electronically Signed by           Aramis Simmons DO  10/12/23 5896

## 2023-10-13 LAB
ATRIAL RATE: 87 BPM
P AXIS: 40 DEGREES
PR INTERVAL: 136 MS
QRS AXIS: 35 DEGREES
QRSD INTERVAL: 112 MS
QT INTERVAL: 380 MS
QTC INTERVAL: 457 MS
T WAVE AXIS: 38 DEGREES
VENTRICULAR RATE: 87 BPM

## 2023-10-13 PROCEDURE — 93010 ELECTROCARDIOGRAM REPORT: CPT | Performed by: INTERNAL MEDICINE

## 2023-11-20 ENCOUNTER — OFFICE VISIT (OUTPATIENT)
Dept: FAMILY MEDICINE CLINIC | Facility: CLINIC | Age: 33
End: 2023-11-20
Payer: COMMERCIAL

## 2023-11-20 VITALS
HEIGHT: 71 IN | TEMPERATURE: 96.8 F | OXYGEN SATURATION: 97 % | WEIGHT: 247 LBS | RESPIRATION RATE: 16 BRPM | DIASTOLIC BLOOD PRESSURE: 80 MMHG | HEART RATE: 77 BPM | SYSTOLIC BLOOD PRESSURE: 120 MMHG | BODY MASS INDEX: 34.58 KG/M2

## 2023-11-20 DIAGNOSIS — M54.42 CHRONIC MIDLINE LOW BACK PAIN WITH LEFT-SIDED SCIATICA: Primary | ICD-10-CM

## 2023-11-20 DIAGNOSIS — G89.29 CHRONIC MIDLINE LOW BACK PAIN WITH LEFT-SIDED SCIATICA: Primary | ICD-10-CM

## 2023-11-20 DIAGNOSIS — F41.9 ANXIETY: ICD-10-CM

## 2023-11-20 PROCEDURE — 96372 THER/PROPH/DIAG INJ SC/IM: CPT

## 2023-11-20 PROCEDURE — 99213 OFFICE O/P EST LOW 20 MIN: CPT | Performed by: FAMILY MEDICINE

## 2023-11-20 RX ORDER — DEXAMETHASONE SODIUM PHOSPHATE 4 MG/ML
4 INJECTION, SOLUTION INTRA-ARTICULAR; INTRALESIONAL; INTRAMUSCULAR; INTRAVENOUS; SOFT TISSUE ONCE
Status: COMPLETED | OUTPATIENT
Start: 2023-11-20 | End: 2023-11-20

## 2023-11-20 RX ORDER — KETOROLAC TROMETHAMINE 30 MG/ML
60 INJECTION, SOLUTION INTRAMUSCULAR; INTRAVENOUS ONCE
Status: COMPLETED | OUTPATIENT
Start: 2023-11-20 | End: 2023-11-20

## 2023-11-20 RX ORDER — PREDNISONE 20 MG/1
TABLET ORAL
Qty: 14 TABLET | Refills: 0 | Status: SHIPPED | OUTPATIENT
Start: 2023-11-20 | End: 2023-12-30

## 2023-11-20 RX ORDER — ALPRAZOLAM 0.25 MG/1
0.25 TABLET ORAL 2 TIMES DAILY PRN
Qty: 60 TABLET | Refills: 0 | Status: SHIPPED | OUTPATIENT
Start: 2023-11-20

## 2023-11-20 RX ADMIN — DEXAMETHASONE SODIUM PHOSPHATE 4 MG: 4 INJECTION, SOLUTION INTRA-ARTICULAR; INTRALESIONAL; INTRAMUSCULAR; INTRAVENOUS; SOFT TISSUE at 10:40

## 2023-11-20 RX ADMIN — KETOROLAC TROMETHAMINE 60 MG: 30 INJECTION, SOLUTION INTRAMUSCULAR; INTRAVENOUS at 10:38

## 2023-11-28 ENCOUNTER — TELEPHONE (OUTPATIENT)
Dept: OTHER | Facility: HOSPITAL | Age: 33
End: 2023-11-28

## 2023-11-28 NOTE — TELEPHONE ENCOUNTER
Patient called he was able to get squeezed in for back surgery on 11/30. Patient needs a pre-op clearance done however patient was just seen by provider on 11/20. Please follow up with patient if he needs to be seen again or if paperwork can be filled out based on 11/20 appointment. Also patient unsure if he needs any bloodwork done for this or not.

## 2023-11-29 ENCOUNTER — TELEPHONE (OUTPATIENT)
Age: 33
End: 2023-11-29

## 2023-11-29 ENCOUNTER — CONSULT (OUTPATIENT)
Dept: FAMILY MEDICINE CLINIC | Facility: CLINIC | Age: 33
End: 2023-11-29
Payer: COMMERCIAL

## 2023-11-29 VITALS
OXYGEN SATURATION: 98 % | HEART RATE: 100 BPM | RESPIRATION RATE: 14 BRPM | TEMPERATURE: 97.3 F | HEIGHT: 71 IN | BODY MASS INDEX: 35.42 KG/M2 | WEIGHT: 253 LBS | DIASTOLIC BLOOD PRESSURE: 92 MMHG | SYSTOLIC BLOOD PRESSURE: 122 MMHG

## 2023-11-29 DIAGNOSIS — M54.42 CHRONIC MIDLINE LOW BACK PAIN WITH LEFT-SIDED SCIATICA: Primary | ICD-10-CM

## 2023-11-29 DIAGNOSIS — Z01.818 PRE-OP EVALUATION: ICD-10-CM

## 2023-11-29 DIAGNOSIS — G89.29 CHRONIC MIDLINE LOW BACK PAIN WITH LEFT-SIDED SCIATICA: Primary | ICD-10-CM

## 2023-11-29 PROCEDURE — 99214 OFFICE O/P EST MOD 30 MIN: CPT | Performed by: STUDENT IN AN ORGANIZED HEALTH CARE EDUCATION/TRAINING PROGRAM

## 2023-11-29 RX ORDER — LIDOCAINE 50 MG/G
1 PATCH TOPICAL DAILY
COMMUNITY
Start: 2023-11-25 | End: 2023-11-30

## 2023-11-29 RX ORDER — NAPROXEN 500 MG/1
500 TABLET ORAL 2 TIMES DAILY
COMMUNITY
Start: 2023-11-25 | End: 2023-12-02

## 2023-11-29 RX ORDER — CYCLOBENZAPRINE HCL 5 MG
5 TABLET ORAL
COMMUNITY
Start: 2023-11-25 | End: 2023-11-30

## 2023-11-29 NOTE — TELEPHONE ENCOUNTER
DR Quevedo office called because he has surgery with them tomorrow morning and was aware that he had a late appt this evening, she will stay on the clock til 5 to get that clearance if we could please fax it to here at  and also send to [facility] at .

## 2023-11-29 NOTE — PROGRESS NOTES
[facility] PRE-OPERATIVE EVALUATION  Formerly [facility] [address] PHYSICIANS    NAME: Nick Corbett  AGE: 29 y.o. SEX: male  : 1990     DATE: 2023    [facility] Pre-Operative Evaluation      Chief Complaint: Pre-operative Evaluation     Surgery: L5-S1 Microdiscectomy  Anticipated Date of Surgery: 23  Referring Provider: No ref. provider found       History of Present Illness:     Nick Corbett is a 29 y.o. male who presents to the office today for a preoperative consultation at the request of surgeon, Dr. Jennifer Hull, who plans on performing Left L5-S1 Microdiscectomy on . Planned anesthesia is general. Patient has a bleeding risk of: no recent abnormal bleeding. Patient does not have objections to receiving blood products if needed. Current anti-platelet/anti-coagulation medications that the patient is prescribed includes:  None . Assessment of Chronic Conditions:   - Asthma:    - Anxiety     Assessment of Cardiac Risk:  Denies unstable or severe angina or MI in the last 6 weeks or history of stent placement in the last year   Denies decompensated heart failure (e.g. New onset heart failure, NYHA functional class IV heart failure, or worsening existing heart failure)  Denies significant arrhythmias such as high grade AV block, symptomatic ventricular arrhythmia, newly recognized ventricular tachycardia, supraventricular tachycardia with resting heart rate >100, or symptomatic bradycardia  Denies severe heart valve disease including aortic stenosis or symptomatic mitral stenosis     Exercise Capacity:  Able to walk 4 blocks without symptoms?: Can walk 4 blocks but has numbness in left foot  Able to walk 2 flights without symptoms?: No, can walk up 2 flights of stairs but not without symptoms of numbness and severe pain in lower back    Prior Anesthesia Reactions: No     Personal history of venous thromboembolic disease? No    History of steroid use for >2 weeks within last year? No         Review of Systems:     Review of Systems   Constitutional:  Positive for activity change. Negative for appetite change, chills, fatigue and fever. HENT:  Negative for congestion, postnasal drip and rhinorrhea. Respiratory:  Negative for cough, shortness of breath and wheezing. Cardiovascular:  Negative for chest pain, palpitations and leg swelling. Gastrointestinal:  Negative for constipation, diarrhea, nausea and vomiting. Musculoskeletal:  Positive for gait problem. Neurological:  Positive for weakness and numbness. Negative for light-headedness and headaches. Psychiatric/Behavioral:  The patient is not nervous/anxious. Current Problem List:     Patient Active Problem List   Diagnosis   • Anxiety   • Mild intermittent asthma without complication   • Panic disorder without agoraphobia   • Chronic midline low back pain with left-sided sciatica       Allergies:      Allergies   Allergen Reactions   • Molds & Smuts Itching       Current Medications:       Current Outpatient Medications:   •  albuterol (PROVENTIL HFA,VENTOLIN HFA) 90 mcg/act inhaler, Inhale 2 puffs every 4 (four) hours as needed for wheezing, Disp: 8.5 g, Rfl: 1  •  ALPRAZolam (XANAX) 0.25 mg tablet, Take 1 tablet (0.25 mg total) by mouth 2 (two) times a day as needed for anxiety, Disp: 60 tablet, Rfl: 0  •  cyclobenzaprine (FLEXERIL) 5 mg tablet, Take 5 mg by mouth, Disp: , Rfl:   •  fluticasone (FLONASE) 50 mcg/act nasal spray, 1 spray into each nostril daily, Disp: , Rfl:   •  lidocaine (LIDODERM) 5 %, Apply 1 patch topically daily, Disp: , Rfl:   •  naproxen (NAPROSYN) 500 mg tablet, Take 500 mg by mouth 2 (two) times a day, Disp: , Rfl:   •  Zubsolv 2.9-0.71 MG SUBL, in the morning, Disp: , Rfl:   •  predniSONE 20 mg tablet, 2 PO QD X 4 DAYS, THEN 1 PO QD X 4 DAYS, THEN 1/2 PO QD X 4 DAYS, Disp: 14 tablet, Rfl: 0    Past Medical History:       Past Medical History:   Diagnosis Date   • Asthma    • Carpal tunnel syndrome     Right - Last Assessed: April 4, 2016    • Exercise-induced asthma     Last Assessed: September 16, 2014        Past Surgical History:   Procedure Laterality Date   • HIP SURGERY          Family History   Problem Relation Age of Onset   • Other Mother         Lymphedema    • Substance Abuse Family    • No Known Problems Father    • No Known Problems Brother    • Mental illness Neg Hx         Social History     Socioeconomic History   • Marital status: Single     Spouse name: Not on file   • Number of children: Not on file   • Years of education: Not on file   • Highest education level: Not on file   Occupational History   • Not on file   Tobacco Use   • Smoking status: Former   • Smokeless tobacco: Former   • Tobacco comments:     vaping daily since stopped smoking 3 yrs ago   Vaping Use   • Vaping Use: Former   • Substances: Flavoring   Substance and Sexual Activity   • Alcohol use: No   • Drug use: No   • Sexual activity: Not on file   Other Topics Concern   • Not on file   Social History Narrative    Caffeine use     Dental Care, occasionally     No advance directives     Use of energy drinks      Social Determinants of Health     Financial Resource Strain: Not on file   Food Insecurity: Not on file   Transportation Needs: Not on file   Physical Activity: Not on file   Stress: Not on file   Social Connections: Not on file   Intimate Partner Violence: Not on file   Housing Stability: Not on file        Physical Exam:     /92 (BP Location: Left arm, Patient Position: Standing, Cuff Size: Large)   Pulse 100   Temp (!) 97.3 °F (36.3 °C) (Temporal)   Resp 14   Ht 5' 11" (1.803 m)   Wt 115 kg (253 lb)   SpO2 98%   BMI 35.29 kg/m²     Physical Exam  Constitutional:       Appearance: Normal appearance. HENT:      Head: Normocephalic and atraumatic. Cardiovascular:      Rate and Rhythm: Normal rate and regular rhythm.       Pulses: Normal pulses. Heart sounds: Normal heart sounds. Pulmonary:      Effort: Pulmonary effort is normal.      Breath sounds: Normal breath sounds. Musculoskeletal:      Lumbar back: Tenderness present. Decreased range of motion. Neurological:      General: No focal deficit present. Mental Status: He is alert and oriented to person, place, and time. Psychiatric:         Mood and Affect: Mood normal.         Behavior: Behavior normal.         Thought Content: Thought content normal.         Judgment: Judgment normal.          Data:     Pre-operative work-up    Laboratory Results: I have personally reviewed the pertinent laboratory results/reports      EKG: I have personally reviewed pertinent reports. Assessment & Recommendations:     1. Chronic midline low back pain with left-sided sciatica        2. Pre-op evaluation            Pre-Op Evaluation Assessment  29 y.o. male with planned surgery: Left L5-S1 Microdiscectomy. Known risk factors for perioperative complications: None. Current medications which may produce withdrawal symptoms if withheld perioperatively: Xanax. Pre-Op Evaluation Plan  1. Further preoperative workup as follows:   - None; no further preoperative work-up is required    2. Medication Management/Recommendations:   - None, continue medication regimen including morning of surgery, with sip of water. Patient notes he stopped naproxen. 3. Prophylaxis for cardiac events with perioperative beta-blockers: not indicated.     4. Patient requires further consultation with: None    Clearance    Patient medically optimized for surgical procedure     Florecita Mcdonald MD  [address]  [address] [place] [id number]  Phone#  [phone]  Fax#  [phone]

## 2023-11-29 NOTE — TELEPHONE ENCOUNTER
As of the office closing at 4:35, we still did not receive the bw & EKG from central faxing.  We have called 2x and emailed once.  Finally they replied the reports would be in the chart within 15 min, but Dr Huntley left for the day being it after 4:30.  Dr Huntley states she will fax the clearance first thing in the morning once the bw and EKG are reviewed.  I called Tyree to make him aware of this.  I informed Tyree we would only call him tomorrow morning if there are any issues.  If he didn't hear from the office, then everything was faxed with no issues.  Tyree stated he understood and thanked us since this was short notice.

## 2023-11-29 NOTE — TELEPHONE ENCOUNTER
Patient called back to see if a pre op appointment is necessary. He is having emergency back surgery, and they were able to schedule him tomorrow 11/30. I was able to reach Nelson Sanchez at the office, she was very helpful and I was able to schedule him today with Dr. Venita Vazquez. He is heading to [facility] now, and is going to see if he can find out if he needs blood work or an EKG.

## 2023-11-30 NOTE — TELEPHONE ENCOUNTER
Clearance was faxed by Ethel at 8am to fax# [phone] and fax# [phone] as requested.    Left message on machine for Tyree rojo was faxed this morning. No further action required

## 2023-12-18 ENCOUNTER — EVALUATION (OUTPATIENT)
Dept: PHYSICAL THERAPY | Facility: CLINIC | Age: 33
End: 2023-12-18
Payer: COMMERCIAL

## 2023-12-18 DIAGNOSIS — Z98.890 STATUS POST LUMBAR DISCECTOMY: Primary | ICD-10-CM

## 2023-12-18 DIAGNOSIS — M54.50 BILATERAL LOW BACK PAIN, UNSPECIFIED CHRONICITY, UNSPECIFIED WHETHER SCIATICA PRESENT: ICD-10-CM

## 2023-12-18 PROCEDURE — 97162 PT EVAL MOD COMPLEX 30 MIN: CPT | Performed by: PHYSICAL THERAPIST

## 2023-12-18 PROCEDURE — 97110 THERAPEUTIC EXERCISES: CPT | Performed by: PHYSICAL THERAPIST

## 2023-12-18 NOTE — LETTER
2023    Farshad Contreras MD  [address] [facility]  [address]  [facility] [address]    Patient: Peter Nguyen   YOB: 1990   Date of Visit: 2023     Encounter Diagnosis     ICD-10-CM    1. Status post lumbar discectomy  Z98.890       2. Bilateral low back pain, unspecified chronicity, unspecified whether sciatica present  M54.50           Dear Dr. Contreras:    Thank you for your recent referral of Peter Nguyen. Please review the attached evaluation summary from Peter's recent visit.     Please verify that you agree with the plan of care by signing the attached order.     If you have any questions or concerns, please do not hesitate to call.     I sincerely appreciate the opportunity to share in the care of one of your patients and hope to have another opportunity to work with you in the near future.       Sincerely,    Jorge Cash, PT      Referring Provider:      I certify that I have read the below Plan of Care and certify the need for these services furnished under this plan of treatment while under my care.                    Farshad Contreras MD  [address] [facility]  [address]  [place] [id number]  Via Fax: [phone]          PT Evaluation     Today's date: 2023  Patient name: Peter Nguyen  : 1990  MRN: [id number]  Referring provider: Bhavesh Arroyo MD  Dx:   Encounter Diagnosis     ICD-10-CM    1. Status post lumbar discectomy  Z98.890       2. Bilateral low back pain, unspecified chronicity, unspecified whether sciatica present  M54.50                      Assessment  Assessment details: Peter Nguyen is a 33 y.o. male who presents status post lumbar discectomy with impaired spinal ROM, poor posture, limited hip strength, impaired neurodynamics, and impaired LE sensation. Due to these impairments, patient has difficulty performing ADL's, prolonged  standing, prolonged sitting, transfers, squatting, functional mobility, recreational activities, work-related activities, lifting/carrying. Patient's clinical presentation is consistent following the aforementioned procedure.   Patient has been educated in post-op contraindications / precautions, postural education, home exercise program, and plan of care. Patient would benefit from skilled physical therapy services to address their aforementioned functional limitations and progress towards prior level of function and independence with home exercise program.        Impairments: abnormal gait, abnormal or restricted ROM, activity intolerance, impaired physical strength, lacks appropriate home exercise program, pain with function, poor posture  and poor body mechanics  Understanding of Dx/Px/POC: good   Prognosis: good    Goals  Short term goals to be accomplished in 2-4weeks:  STG 1: Pt will demo independence with postural management  STG 2: Pt will demo I with HEP to maximize progress between therapy sessions  STG 3: Pt will demo L/S AROM < or = min loss throughout to promote improved functional mobility and body mechanics  STG 4: Pt will demo 1/2 MMT grade core stabilizers to improve lumbar stability with functional challenges  STG 5: Pt will reports pain dec freq and intensity 50%  STG 6: Pt will deny sleep disturbance     Long term goals to be accomplished in 4-8 weeks:  LTG 1: Pt will demo good body mech with >75% functional challenges to prevent reinjury  LTG 2: Pt will be able to return to standing activity for >1 hour free as per PLOF  LTG 3: Pt will demo Good strength core stabilizers to promote carryover with body mech and posture    Plan  Plan details: HEP development, stretching, strengthening, A/AA/PROM, joint mobilizations, posture education, body mechanics training for bending and lifting, STM/MI as needed to reduce muscle tension, balance and proprioceptive training, muscle reeducation, PLOC discussed  "and agreed upon with patient.      Patient would benefit from: PT eval and skilled physical therapy  Planned modality interventions: cryotherapy and thermotherapy: hydrocollator packs  Planned therapy interventions: manual therapy, neuromuscular re-education, self care, therapeutic activities, therapeutic exercise, home exercise program, body mechanics training, patient education, postural training, strengthening and stretching  Frequency: 2x week  Duration in weeks: 8  Plan of Care beginning date: 12/18/2023  Plan of Care expiration date: 2/12/2024  Treatment plan discussed with: patient        Subjective Evaluation    History of Present Illness  Mechanism of injury: Peter Nguyen initially injured his low back about 1.5 years ago.  He had conservative treatment of both PT and chiropractic care with some relief.  He however had continued with intermittent numbness with prolonged standing.  Pain in low back significantly increased and led to surgical intervention.  Peter Nguyen underwent L5-S1 discectomy on 11/30/23.      He states since surgery his numbness in right LE is almost abolished and left side is steadily improving with intermittent numbness in left foot that is worsened with static standing.  He has soreness at incision site from pressure but his low back is significantly improved since surgery.      Pain Location: Lumbar incision area, tightness in bilateral calves.  Pain Type: Ache  Worst: 3/10  Best: 0/10  Current: 1/10     Aggravating Factors: Prolonged sitting, static standing, bending, sit to stand, pressure on incision, intermittent disturbances to sleep     Reducing Factors: Walking, sitting in supportive chair.    As per  FOTO pt reports   \"Yes, limited a lot\" with:    - Participating in recreation    - Lifting or carrying items like groceries   \"Extreme Difficulty or unable to perform\"    - With your usual hobbies, recreational or sporting activities    - Bending or stooping "   Social Support  Steps to enter house: yes  Stairs in house: yes   Lives in: multiple-level home  Lives with: spouse    Employment status: working ([employer])  Hand dominance: right        Objective     Concurrent Complaints  Positive for disturbed sleep. Negative for bladder dysfunction, bowel dysfunction and saddle (S4) numbness    Active Range of Motion     Lumbar   Flexion:  with pain Restriction level: maximal  Extension:  with pain Restriction level: minimal  Left lateral flexion:  with pain Restriction level: minimal  Right lateral flexion:  with pain Restriction level: minimal    Strength/Myotome Testing     Left Hip   Planes of Motion   Flexion: 5  External rotation: 5  Internal rotation: 5    Right Hip   Planes of Motion   Flexion: 5  External rotation: 4  Internal rotation: 4-    Left Knee   Flexion: 5  Extension: 5    Right Knee   Flexion: 5  Extension: 5    Left Ankle/Foot   Dorsiflexion: 5  Plantar flexion: 5    Right Ankle/Foot   Dorsiflexion: 5  Plantar flexion: 5    Tests     Lumbar     Left   Positive passive SLR and slump test.     Right   Positive passive SLR and slump test.     Left Hip   Negative BOBBY and FADIR.     Right Hip   Negative BOBBY and FADIR.     Ambulation     Observational Gait     Additional Observational Gait Details  Patient ambulating with guarded pattern, reduced trunk rotation and decreased arm swing.    Functional Assessment        Comments  Sit to stand - patient extremely guarded with significant reliance on BUE support on arm rests.    General Comments:      Hip Comments   Right hip 50% loss of hip IR.  Left hip WNL in all planes.      Flowsheet Rows      Flowsheet Row Most Recent Value   PT/OT G-Codes    Current Score 46   Projected Score 60               Precautions: SP Discectomy    Access Code: [email or web address]  URL: [email or web address]  Date: 12/18/2023  Prepared by: Jorge Cash    Exercises  - Supine Lower Trunk Rotation  - 1-2 x daily - 7 x  weekly - 2 sets - 10 reps  - Supine Posterior Pelvic Tilt  - 1-2 x daily - 7 x weekly - 2 sets - 10 reps - 5 hold  - Supine 90/90 Sciatic Nerve Glide with Knee Flexion/Extension  - 1-2 x daily - 7 x weekly - 2 sets - 10 reps     Insurance:  AMA/CMS Eval/ Re-eval Auth #/ Referral # Total units  Start date  Expiration date Extension  Visit limitation?  PT only or  PT+OT? Co-Insurance   CMS 12/18/23 NA       10% co  OOP met                 POC Start Date POC Expiration Date Signed POC?   12/18/23 2/12/24 Pend      Date               Visits/Units:  Used               Authed:  Remaining                       Manuals   12/18                           Neuro Re-Ed                                                Ther Ex      HEP   initiated   Education   Post-op precautions                                       Ther Activity                  Gait Training                  Modalities

## 2023-12-18 NOTE — PROGRESS NOTES
PT Evaluation     Today's date: 2023  Patient name: Peter Nguyen  : 1990  MRN: [id number]  Referring provider: Bhavesh Arroyo MD  Dx:   Encounter Diagnosis     ICD-10-CM    1. Status post lumbar discectomy  Z98.890       2. Bilateral low back pain, unspecified chronicity, unspecified whether sciatica present  M54.50                      Assessment  Assessment details: Peter Nguyen is a 33 y.o. male who presents status post lumbar discectomy with impaired spinal ROM, poor posture, limited hip strength, impaired neurodynamics, and impaired LE sensation. Due to these impairments, patient has difficulty performing ADL's, prolonged standing, prolonged sitting, transfers, squatting, functional mobility, recreational activities, work-related activities, lifting/carrying. Patient's clinical presentation is consistent following the aforementioned procedure.   Patient has been educated in post-op contraindications / precautions, postural education, home exercise program, and plan of care. Patient would benefit from skilled physical therapy services to address their aforementioned functional limitations and progress towards prior level of function and independence with home exercise program.        Impairments: abnormal gait, abnormal or restricted ROM, activity intolerance, impaired physical strength, lacks appropriate home exercise program, pain with function, poor posture  and poor body mechanics  Understanding of Dx/Px/POC: good   Prognosis: good    Goals  Short term goals to be accomplished in 2-4weeks:  STG 1: Pt will demo independence with postural management  STG 2: Pt will demo I with HEP to maximize progress between therapy sessions  STG 3: Pt will demo L/S AROM < or = min loss throughout to promote improved functional mobility and body mechanics  STG 4: Pt will demo 1/2 MMT grade core stabilizers to improve lumbar stability with functional challenges  STG 5: Pt will reports pain  dec freq and intensity 50%  STG 6: Pt will deny sleep disturbance     Long term goals to be accomplished in 4-8 weeks:  LTG 1: Pt will demo good body mech with >75% functional challenges to prevent reinjury  LTG 2: Pt will be able to return to standing activity for >1 hour free as per PLOF  LTG 3: Pt will demo Good strength core stabilizers to promote carryover with body mech and posture    Plan  Plan details: HEP development, stretching, strengthening, A/AA/PROM, joint mobilizations, posture education, body mechanics training for bending and lifting, STM/MI as needed to reduce muscle tension, balance and proprioceptive training, muscle reeducation, PLOC discussed and agreed upon with patient.      Patient would benefit from: PT eval and skilled physical therapy  Planned modality interventions: cryotherapy and thermotherapy: hydrocollator packs  Planned therapy interventions: manual therapy, neuromuscular re-education, self care, therapeutic activities, therapeutic exercise, home exercise program, body mechanics training, patient education, postural training, strengthening and stretching  Frequency: 2x week  Duration in weeks: 8  Plan of Care beginning date: 12/18/2023  Plan of Care expiration date: 2/12/2024  Treatment plan discussed with: patient        Subjective Evaluation    History of Present Illness  Mechanism of injury: Peter Nguyen initially injured his low back about 1.5 years ago.  He had conservative treatment of both PT and chiropractic care with some relief.  He however had continued with intermittent numbness with prolonged standing.  Pain in low back significantly increased and led to surgical intervention.  Peter Nguyen underwent L5-S1 discectomy on 11/30/23.      He states since surgery his numbness in right LE is almost abolished and left side is steadily improving with intermittent numbness in left foot that is worsened with static standing.  He has soreness at incision site  "from pressure but his low back is significantly improved since surgery.      Pain Location: Lumbar incision area, tightness in bilateral calves.  Pain Type: Ache  Worst: 3/10  Best: 0/10  Current: 1/10     Aggravating Factors: Prolonged sitting, static standing, bending, sit to stand, pressure on incision, intermittent disturbances to sleep     Reducing Factors: Walking, sitting in supportive chair.    As per  FOTO pt reports   \"Yes, limited a lot\" with:    - Participating in recreation    - Lifting or carrying items like groceries   \"Extreme Difficulty or unable to perform\"    - With your usual hobbies, recreational or sporting activities    - Bending or stooping   Social Support  Steps to enter house: yes  Stairs in house: yes   Lives in: multiple-level home  Lives with: spouse    Employment status: working ([employer])  Hand dominance: right        Objective     Concurrent Complaints  Positive for disturbed sleep. Negative for bladder dysfunction, bowel dysfunction and saddle (S4) numbness    Active Range of Motion     Lumbar   Flexion:  with pain Restriction level: maximal  Extension:  with pain Restriction level: minimal  Left lateral flexion:  with pain Restriction level: minimal  Right lateral flexion:  with pain Restriction level: minimal    Strength/Myotome Testing     Left Hip   Planes of Motion   Flexion: 5  External rotation: 5  Internal rotation: 5    Right Hip   Planes of Motion   Flexion: 5  External rotation: 4  Internal rotation: 4-    Left Knee   Flexion: 5  Extension: 5    Right Knee   Flexion: 5  Extension: 5    Left Ankle/Foot   Dorsiflexion: 5  Plantar flexion: 5    Right Ankle/Foot   Dorsiflexion: 5  Plantar flexion: 5    Tests     Lumbar     Left   Positive passive SLR and slump test.     Right   Positive passive SLR and slump test.     Left Hip   Negative BOBBY and FADIR.     Right Hip   Negative BOBBY and FADIR.     Ambulation     Observational Gait     Additional Observational Gait " Details  Patient ambulating with guarded pattern, reduced trunk rotation and decreased arm swing.    Functional Assessment        Comments  Sit to stand - patient extremely guarded with significant reliance on BUE support on arm rests.    General Comments:      Hip Comments   Right hip 50% loss of hip IR.  Left hip WNL in all planes.      Flowsheet Rows      Flowsheet Row Most Recent Value   PT/OT G-Codes    Current Score 46   Projected Score 60               Precautions: SP Discectomy    Access Code: [email or web address]  URL: [email or web address]  Date: 12/18/2023  Prepared by: Jorge Cash    Exercises  - Supine Lower Trunk Rotation  - 1-2 x daily - 7 x weekly - 2 sets - 10 reps  - Supine Posterior Pelvic Tilt  - 1-2 x daily - 7 x weekly - 2 sets - 10 reps - 5 hold  - Supine 90/90 Sciatic Nerve Glide with Knee Flexion/Extension  - 1-2 x daily - 7 x weekly - 2 sets - 10 reps     Insurance:  [employer]/CMS Eval/ Re-eval Auth #/ Referral # Total units  Start date  Expiration date Extension  Visit limitation?  PT only or  PT+OT? Co-Insurance   CMS 12/18/23 NA       10% co  OOP met                 POC Start Date POC Expiration Date Signed POC?   12/18/23 2/12/24 Pend      Date               Visits/Units:  Used               Authed:  Remaining                       Manuals   12/18                           Neuro Re-Ed                                                Ther Ex      HEP   initiated   Education   Post-op precautions                                       Ther Activity                  Gait Training                  Modalities

## 2023-12-21 ENCOUNTER — OFFICE VISIT (OUTPATIENT)
Dept: PHYSICAL THERAPY | Facility: CLINIC | Age: 33
End: 2023-12-21
Payer: COMMERCIAL

## 2023-12-21 DIAGNOSIS — Z98.890 STATUS POST LUMBAR DISCECTOMY: Primary | ICD-10-CM

## 2023-12-21 DIAGNOSIS — M54.50 BILATERAL LOW BACK PAIN, UNSPECIFIED CHRONICITY, UNSPECIFIED WHETHER SCIATICA PRESENT: ICD-10-CM

## 2023-12-21 PROCEDURE — 97112 NEUROMUSCULAR REEDUCATION: CPT

## 2023-12-21 PROCEDURE — 97110 THERAPEUTIC EXERCISES: CPT

## 2023-12-21 NOTE — PROGRESS NOTES
Daily Note     Today's date: 2023  Patient name: Peter Nguyen  : 1990  MRN: [id number]  Referring provider: Farshad Contreras MD  Dx:   Encounter Diagnoses   Name Primary?    Status post lumbar discectomy Yes    Bilateral low back pain, unspecified chronicity, unspecified whether sciatica present                   Subjective: Pt reports 0/10 pain today. States his left foot continues to be numb. Back gets sore after sitting for longer than 45 min.       Objective: See treatment diary below      Assessment: Pt tolerated treatment well with minimal complaints of pain. Pt with poor TA and glute max contractions. Pain with bed mobility improved with cueing to engage TA. Fatigued at end of session. Reviewed initial HEP with patient and demonstrates independence.      Plan: Continue with plan of care to decrease pain, improve mobility, strength, and function.          Precautions: SP Discectomy    Access Code: [email or web address]  URL: [email or web address]  Date: 2023  Prepared by: Jorge Cash    Exercises  - Supine Lower Trunk Rotation  - 1-2 x daily - 7 x weekly - 2 sets - 10 reps  - Supine Posterior Pelvic Tilt  - 1-2 x daily - 7 x weekly - 2 sets - 10 reps - 5 hold  - Supine 90/90 Sciatic Nerve Glide with Knee Flexion/Extension  - 1-2 x daily - 7 x weekly - 2 sets - 10 reps     Insurance:  A/CMS Eval/ Re-eval Auth #/ Referral # Total units  Start date  Expiration date Extension  Visit limitation?  PT only or  PT+OT? Co-Insurance   CMS 23 NA       10% co  OOP met                 POC Start Date POC Expiration Date Signed POC?   23 Pend      Date               Visits/Units:  Used               Authed:  Remaining                       Manuals                             Neuro Re-Ed      TA  Hold 5, 2x10     TA+ add  Hold 5, 2x10     TA +abd  Hold 5, 2x10     TA with marches  Hold 5, 2x10     Prone glute set  Hold 5, 2x10                 Ther Ex      HEP    initiated   Education   Post-op precautions   LTR  Hold 5, 2x10     Pelvic tilts  Hold 5, 2x10     Sciatic nerve glide  X10 ea                      Ther Activity                  Gait Training                  Modalities

## 2023-12-27 ENCOUNTER — OFFICE VISIT (OUTPATIENT)
Dept: PHYSICAL THERAPY | Facility: CLINIC | Age: 33
End: 2023-12-27
Payer: COMMERCIAL

## 2023-12-27 DIAGNOSIS — Z98.890 STATUS POST LUMBAR DISCECTOMY: Primary | ICD-10-CM

## 2023-12-27 DIAGNOSIS — M54.50 BILATERAL LOW BACK PAIN, UNSPECIFIED CHRONICITY, UNSPECIFIED WHETHER SCIATICA PRESENT: ICD-10-CM

## 2023-12-27 PROCEDURE — 97112 NEUROMUSCULAR REEDUCATION: CPT | Performed by: PHYSICAL THERAPIST

## 2023-12-27 PROCEDURE — 97110 THERAPEUTIC EXERCISES: CPT | Performed by: PHYSICAL THERAPIST

## 2023-12-27 NOTE — PROGRESS NOTES
Daily Note     Today's date: 2023  Patient name: Peter Nguyen  : 1990  MRN: [id number]  Referring provider: Farshad Contreras MD  Dx:   Encounter Diagnosis     ICD-10-CM    1. Status post lumbar discectomy  Z98.890       2. Bilateral low back pain, unspecified chronicity, unspecified whether sciatica present  M54.50           Start Time: 0845  Stop Time: 09  Total time in clinic (min): 45 minutes    Subjective: Peter Nguyen reports he is noticing slow gains in motion and decreased soreness in low back.  He reports his LE symptoms continue to improve when he is walking vs sitting.      Objective: See treatment diary below      Assessment: Tolerated treatment well. Patient with improving neurodynamics with sciatic tensioners today.  She also demos signifcant left sided glute tightness that provoked LLE symptoms.  He demod good understanding of progressed HEP.      Plan: Continue per plan of care.      Precautions: SP Discectomy    Access Code: [email or web address]  URL: [email or web address]  Date: 2023  Prepared by: Jorge Cash    Exercises  - Supine Lower Trunk Rotation  - 1-2 x daily - 7 x weekly - 2 sets - 10 reps  - Supine Posterior Pelvic Tilt  - 1-2 x daily - 7 x weekly - 2 sets - 10 reps - 5 hold  - Supine 90/90 Sciatic Nerve Glide with Knee Flexion/Extension  - 1-2 x daily - 7 x weekly - 2 sets - 10 reps  - Seated Slump Nerve Glide  - 1-2 x daily - 7 x weekly - 1 sets - 10 reps  - Supine Gluteus Stretch  - 2-3 x daily - 7 x weekly - 3 sets - 30 hold  - Supine Bridge  - 1 x daily - 7 x weekly - 1-2 sets - 10 reps     Insurance:  AMA/CMS Eval/ Re-eval Auth #/ Referral # Total units  Start date  Expiration date Extension  Visit limitation?  PT only or  PT+OT? Co-Insurance   CMS 23 NA       10% co  OOP met                 POC Start Date POC Expiration Date Signed POC?   23 Pend      Date               Visits/Units:  Used               Authed:   Remaining                       Manuals 12/27 12/21 12/18                           Neuro Re-Ed      TA  Hold 5, 2x10     TA+ add  Hold 5, 2x10     TA +abd  Hold 5, 2x10     TA with marches  Hold 5, 2x10     Prone glute set  Hold 5, 2x10     Bridge w/ abd stab 10x     Sup sciatic nerve tensioner Knee flex +PF into knee ext +PF  10x B     Seated slump W/cervical extension  10x     Seated slump nerve tensioner Slump w/ CS ext + knee ext and PF  10x into opposite     Ther Ex      HEP   initiated   Education   Post-op precautions   LTR 20x5s Hold 5, 2x10     Pelvic tilts  Hold 5, 2x10     Sciatic nerve glide  X10 ea    Hook BOBBY str 3x30s B     Hook cross body glut str 3x30s B (provokes left LE symptoms but recovers)

## 2023-12-29 ENCOUNTER — OFFICE VISIT (OUTPATIENT)
Dept: PHYSICAL THERAPY | Facility: CLINIC | Age: 33
End: 2023-12-29
Payer: COMMERCIAL

## 2023-12-29 DIAGNOSIS — Z98.890 STATUS POST LUMBAR DISCECTOMY: Primary | ICD-10-CM

## 2023-12-29 DIAGNOSIS — M54.50 BILATERAL LOW BACK PAIN, UNSPECIFIED CHRONICITY, UNSPECIFIED WHETHER SCIATICA PRESENT: ICD-10-CM

## 2023-12-29 PROCEDURE — 97110 THERAPEUTIC EXERCISES: CPT

## 2023-12-29 PROCEDURE — 97112 NEUROMUSCULAR REEDUCATION: CPT

## 2023-12-29 NOTE — PROGRESS NOTES
Daily Note     Today's date: 2023  Patient name: Peter Nguyen  : 1990  MRN: [id number]  Referring provider: Farshad Contreras MD  Dx:   Encounter Diagnosis     ICD-10-CM    1. Status post lumbar discectomy  Z98.890       2. Bilateral low back pain, unspecified chronicity, unspecified whether sciatica present  M54.50             Start Time: 0800  Stop Time: 0845  Total time in clinic (min): 45 minutes    Subjective: Pt reports he continues to feel better with movement, pt reports L LE numbness remains about 3/10. Pt reports no increased symptoms after last therapy session, pt reports compliance with HEP.       Objective: See treatment diary below      Assessment: Pt reports no symptoms in L/S throughout treatment session, incision healing well, mild redness at inferior end, educated on scar tissue mobilization over the next 2 weeks, pt verbalized understanding. Pt inquired about lifting progression, instructed we will progress with body weight exercises to insure proper body mechanics, and progress from there.     Plan: Continue per plan of care.      Precautions: SP Discectomy    Access Code: [email or web address]  URL: [email or web address]  Date: 2023  Prepared by: Jorge Cash    Exercises  - Supine Lower Trunk Rotation  - 1-2 x daily - 7 x weekly - 2 sets - 10 reps  - Supine Posterior Pelvic Tilt  - 1-2 x daily - 7 x weekly - 2 sets - 10 reps - 5 hold  - Supine 90/90 Sciatic Nerve Glide with Knee Flexion/Extension  - 1-2 x daily - 7 x weekly - 2 sets - 10 reps  - Seated Slump Nerve Glide  - 1-2 x daily - 7 x weekly - 1 sets - 10 reps  - Supine Gluteus Stretch  - 2-3 x daily - 7 x weekly - 3 sets - 30 hold  - Supine Bridge  - 1 x daily - 7 x weekly - 1-2 sets - 10 reps     Insurance:  AMA/CMS Eval/ Re-eval Auth #/ Referral # Total units  Start date  Expiration date Extension  Visit limitation?  PT only or  PT+OT? Co-Insurance   CMS 23 NA       10% co  OOP met                  POC Start Date POC Expiration Date Signed POC?   12/18/23 2/12/24 Pend      Date               Visits/Units:  Used               Authed:  Remaining                       Manuals 12/29 12/27 12/21 12/18                               Neuro Re-Ed       TA   Hold 5, 2x10     TA+ add Hold 5, 2x10   Hold 5, 2x10     TA +abd Hold 5, 2x10   Hold 5, 2x10     TA with marches Hold 5, 2x10   Hold 5, 2x10     Prone glute set H/L 10x2 hold 5  Hold 5, 2x10     Bridge w/ abd stab 10x2 10x     Sup sciatic nerve tensioner Knee flex +PF into knee ext +PF  10x B Knee flex +PF into knee ext +PF  10x B     Seated slump W/cervical extension  10x W/cervical extension  10x     Clamshells 10x2      Seated slump nerve tensioner Slump w/ CS ext + knee ext and PF  10x into opposite Slump w/ CS ext + knee ext and PF  10x into opposite     Ther Ex                     HEP    initiated   Education    Post-op precautions   LTR 20x5s 20x5s Hold 5, 2x10     Pelvic tilts   Hold 5, 2x10     Sciatic nerve glide   X10 ea    Hook BOBBY str 3x30s B into figure 4 on L  3x30s B     Hook cross body glut str  3x30s B (provokes left LE symptoms but recovers)

## 2023-12-30 PROBLEM — M51.16 INTERVERTEBRAL DISC DISORDERS WITH RADICULOPATHY, LUMBAR REGION: Status: ACTIVE | Noted: 2023-11-30

## 2023-12-31 NOTE — PROGRESS NOTES
Name: Peter Nguyen      : 1990      MRN: [id number]  Encounter Provider: Bhavesh Arroyo MD  Encounter Date: 2023   Encounter department: [facility]    Assessment & Plan     1. Chronic midline low back pain with left-sided sciatica  -     ketorolac (TORADOL) 60 mg/2 mL IM injection 60 mg  -     dexamethasone (DECADRON) injection 4 mg    2. Anxiety  -     ALPRAZolam (XANAX) 0.25 mg tablet; Take 1 tablet (0.25 mg total) by mouth 2 (two) times a day as needed for anxiety           Subjective      Back Pain  This is a recurrent problem. The current episode started 1 to 4 weeks ago. The problem has been rapidly worsening since onset. The pain is present in the lumbar spine. The quality of the pain is described as aching. The pain radiates to the left thigh and left knee. The pain is moderate. The pain is The same all the time. The symptoms are aggravated by bending and twisting. Associated symptoms include leg pain. Pertinent negatives include no abdominal pain, bladder incontinence, bowel incontinence, chest pain, dysuria, fever, headaches, numbness, paresis, paresthesias, pelvic pain, perianal numbness, tingling or weakness. He has tried analgesics and NSAIDs for the symptoms. The treatment provided mild relief.     Review of Systems   Constitutional:  Negative for chills, fatigue and fever.   HENT:  Negative for sore throat.    Eyes:  Negative for discharge.   Respiratory:  Negative for cough and chest tightness.    Cardiovascular:  Negative for chest pain and palpitations.   Gastrointestinal:  Negative for abdominal pain, bowel incontinence, diarrhea, nausea and vomiting.   Genitourinary:  Negative for bladder incontinence, dysuria and pelvic pain.   Musculoskeletal:  Positive for back pain. Negative for arthralgias and gait problem.   Neurological:  Negative for dizziness, tingling, weakness, numbness, headaches and paresthesias.   Hematological:  Negative for  "adenopathy.   Psychiatric/Behavioral:  The patient is not nervous/anxious.        Current Outpatient Medications on File Prior to Visit   Medication Sig   • albuterol (PROVENTIL HFA,VENTOLIN HFA) 90 mcg/act inhaler Inhale 2 puffs every 4 (four) hours as needed for wheezing   • fluticasone (FLONASE) 50 mcg/act nasal spray 1 spray into each nostril daily   • Zubsolv 2.9-0.71 MG SUBL in the morning       Objective     /80 (BP Location: Left arm, Patient Position: Sitting, Cuff Size: Standard)   Pulse 77   Temp (!) 96.8 °F (36 °C) (Temporal)   Resp 16   Ht 5' 11\" (1.803 m)   Wt 112 kg (247 lb)   SpO2 97%   BMI 34.45 kg/m²     Physical Exam  Constitutional:       General: He is not in acute distress.     Appearance: He is well-developed. He is not ill-appearing.   HENT:      Head: Normocephalic and atraumatic.   Eyes:      General:         Right eye: No discharge.         Left eye: No discharge.      Conjunctiva/sclera: Conjunctivae normal.      Pupils: Pupils are equal, round, and reactive to light.   Neck:      Thyroid: No thyromegaly.      Vascular: No JVD.   Cardiovascular:      Rate and Rhythm: Normal rate and regular rhythm.      Heart sounds: Normal heart sounds. No murmur heard.  Pulmonary:      Effort: Pulmonary effort is normal.      Breath sounds: Normal breath sounds. No wheezing or rales.   Abdominal:      General: Bowel sounds are normal.      Palpations: Abdomen is soft. There is no mass.      Tenderness: There is no abdominal tenderness. There is no guarding or rebound.   Musculoskeletal:         General: Tenderness present. No deformity.      Cervical back: Neck supple.   Lymphadenopathy:      Cervical: No cervical adenopathy.   Skin:     General: Skin is warm and dry.      Findings: No erythema or rash.   Neurological:      General: No focal deficit present.      Mental Status: He is alert and oriented to person, place, and time.   Psychiatric:         Mood and Affect: Mood normal.         " Behavior: Behavior normal.         Thought Content: Thought content normal.         Judgment: Judgment normal.       Bhavesh Arroyo MD

## 2023-12-31 NOTE — PATIENT INSTRUCTIONS
REST  ALTERNATE COOL AND WARM COMPRESSES    MEDICATION AS DIRECTED    CALL NEXT WEEK WITH UPDATE, SOONER PRN

## 2024-01-02 ENCOUNTER — OFFICE VISIT (OUTPATIENT)
Dept: PHYSICAL THERAPY | Facility: CLINIC | Age: 34
End: 2024-01-02
Payer: COMMERCIAL

## 2024-01-02 DIAGNOSIS — Z98.890 STATUS POST LUMBAR DISCECTOMY: Primary | ICD-10-CM

## 2024-01-02 DIAGNOSIS — M54.50 BILATERAL LOW BACK PAIN, UNSPECIFIED CHRONICITY, UNSPECIFIED WHETHER SCIATICA PRESENT: ICD-10-CM

## 2024-01-02 PROCEDURE — 97112 NEUROMUSCULAR REEDUCATION: CPT | Performed by: PHYSICAL THERAPIST

## 2024-01-02 PROCEDURE — 97110 THERAPEUTIC EXERCISES: CPT | Performed by: PHYSICAL THERAPIST

## 2024-01-02 NOTE — PROGRESS NOTES
Daily Note     Today's date: 2024  Patient name: Peter Nguyen  : 1990  MRN: [id number]  Referring provider: Farshad Contreras MD  Dx:   Encounter Diagnosis     ICD-10-CM    1. Status post lumbar discectomy  Z98.890       2. Bilateral low back pain, unspecified chronicity, unspecified whether sciatica present  M54.50                      Subjective: Peter Nguyen reports today was his first day back at work.  He was only in the shop not doing as much manual work and states he felt pretty good except when he was sitting or standing prolonged.  He avoided any lifting at this time.      Objective: See treatment diary below      Assessment: Tolerated treatment well. Patient continues to demo improving neurodynamics with ability to tolerate sciatic tensioners vs sliders with minimal provocation.        Plan: Continue per plan of care.      Precautions: SP Discectomy    Access Code: [email or web address]  URL: [email or web address]  Date: 2023  Prepared by: Jorge Cash    Exercises  - Supine Lower Trunk Rotation  - 1-2 x daily - 7 x weekly - 2 sets - 10 reps  - Supine Posterior Pelvic Tilt  - 1-2 x daily - 7 x weekly - 2 sets - 10 reps - 5 hold  - Supine 90/90 Sciatic Nerve Glide with Knee Flexion/Extension  - 1-2 x daily - 7 x weekly - 2 sets - 10 reps  - Seated Slump Nerve Glide  - 1-2 x daily - 7 x weekly - 1 sets - 10 reps  - Supine Gluteus Stretch  - 2-3 x daily - 7 x weekly - 3 sets - 30 hold  - Supine Bridge  - 1 x daily - 7 x weekly - 1-2 sets - 10 reps     Insurance:  AMA/CMS Eval/ Re-eval Auth #/ Referral # Total units  Start date  Expiration date Extension  Visit limitation?  PT only or  PT+OT? Co-Insurance   CMS 23 NA       10% co  OOP met                 POC Start Date POC Expiration Date Signed POC?   23 Pend      Date               Visits/Units:  Used               Authed:  Remaining                       Manuals                                     Neuro Re-Ed        TA    Hold 5, 2x10     TA+ add  Hold 5, 2x10   Hold 5, 2x10     TA +abd  Hold 5, 2x10   Hold 5, 2x10     TA with marches  Hold 5, 2x10   Hold 5, 2x10     Prone glute set  H/L 10x2 hold 5  Hold 5, 2x10     Bridge w/ abd stab  10x2 10x     Sup sciatic nerve tensioner Knee flex +PF into knee ext +PF  10x B Knee flex +PF into knee ext +PF  10x B Knee flex +PF into knee ext +PF  10x B     Sup sciatic nerve tensioner Knee ext + DF - 10x B       Seated slump  W/cervical extension  10x W/cervical extension  10x     Clamshells  10x2      Seated slump nerve tensioner Slump w/ CS ext + knee ext and PF  10x into opposite Slump w/ CS ext + knee ext and PF  10x into opposite Slump w/ CS ext + knee ext and PF  10x into opposite     Seated sciatic tensioner CS ext + knee ext into CS flex/slouch/knee flex  10x B       Ther Ex                        HEP     initiated   Education     Post-op precautions   LTR 20x5s 20x5s 20x5s Hold 5, 2x10     Pelvic tilts    Hold 5, 2x10     Sciatic nerve glide    X10 ea    Hook BOBBY str 3x30s B - modified with increased hip flex 3x30s B into figure 4 on L  3x30s B     Hook cross body glut str 3x30s L only as right provoked groin pain  3x30s B (provokes left LE symptoms but recovers)

## 2024-01-04 ENCOUNTER — OFFICE VISIT (OUTPATIENT)
Dept: PHYSICAL THERAPY | Facility: CLINIC | Age: 34
End: 2024-01-04
Payer: COMMERCIAL

## 2024-01-04 DIAGNOSIS — M54.50 BILATERAL LOW BACK PAIN, UNSPECIFIED CHRONICITY, UNSPECIFIED WHETHER SCIATICA PRESENT: ICD-10-CM

## 2024-01-04 DIAGNOSIS — Z98.890 STATUS POST LUMBAR DISCECTOMY: Primary | ICD-10-CM

## 2024-01-04 PROCEDURE — 97110 THERAPEUTIC EXERCISES: CPT | Performed by: PHYSICAL THERAPIST

## 2024-01-04 PROCEDURE — 97112 NEUROMUSCULAR REEDUCATION: CPT | Performed by: PHYSICAL THERAPIST

## 2024-01-04 NOTE — PROGRESS NOTES
Daily Note     Today's date: 2024  Patient name: Peter Nguyen  : 1990  MRN: [id number]  Referring provider: Farshad Contreras MD  Dx:   Encounter Diagnosis     ICD-10-CM    1. Status post lumbar discectomy  Z98.890       2. Bilateral low back pain, unspecified chronicity, unspecified whether sciatica present  M54.50                      Subjective: Peter Nguyen reports he has been doing well at work this week and has been able to move around pretty well with overall decreasing LE symptoms.  He did have to do more sitting today at work which did increase his LE symptoms slightly today.      Objective: See treatment diary below      Assessment: Tolerated treatment well. Patient demonstrated fatigue post treatment, while tolerating intro to squatting and lifting mechanics training.        Plan: Continue per plan of care.      Precautions: SP Discectomy    Access Code: [email or web address]  URL: [email or web address]  Date: 2023  Prepared by: Jorge Cash    Exercises  - Supine Lower Trunk Rotation  - 1-2 x daily - 7 x weekly - 2 sets - 10 reps  - Supine Posterior Pelvic Tilt  - 1-2 x daily - 7 x weekly - 2 sets - 10 reps - 5 hold  - Supine 90/90 Sciatic Nerve Glide with Knee Flexion/Extension  - 1-2 x daily - 7 x weekly - 2 sets - 10 reps  - Seated Slump Nerve Glide  - 1-2 x daily - 7 x weekly - 1 sets - 10 reps  - Supine Gluteus Stretch  - 2-3 x daily - 7 x weekly - 3 sets - 30 hold  - Supine Bridge  - 1 x daily - 7 x weekly - 1-2 sets - 10 reps     Insurance:  AMA/CMS Eval/ Re-eval Auth #/ Referral # Total units  Start date  Expiration date Extension  Visit limitation?  PT only or  PT+OT? Co-Insurance   CMS 23 NA       10% co  OOP met                 POC Start Date POC Expiration Date Signed POC?   23 Pend      Date               Visits/Units:  Used               Authed:  Remaining                       Manuals                                     Neuro Re-Ed        TA     Hold 5, 2x10    TA+ add   Hold 5, 2x10   Hold 5, 2x10    TA +abd   Hold 5, 2x10   Hold 5, 2x10    TA with marches   Hold 5, 2x10   Hold 5, 2x10    Prone glute set   H/L 10x2 hold 5  Hold 5, 2x10    Bridge w/ abd stab   10x2 10x    Sup sciatic nerve tensioner  Knee flex +PF into knee ext +PF  10x B Knee flex +PF into knee ext +PF  10x B Knee flex +PF into knee ext +PF  10x B    Sup sciatic nerve tensioner  Knee ext + DF - 10x B      Seated slump   W/cervical extension  10x W/cervical extension  10x    Clamshells   10x2     Seated slump nerve tensioner  Slump w/ CS ext + knee ext and PF  10x into opposite Slump w/ CS ext + knee ext and PF  10x into opposite Slump w/ CS ext + knee ext and PF  10x into opposite    Seated sciatic tensioner CS ext + knee ext into CS flex/slouch/knee flex  2x10 B CS ext + knee ext into CS flex/slouch/knee flex  10x B      STS Training + 10x       Squat lift mechanics 10lbs dumbbell on end from sitting position  2x10       SLS on/off horse 10x/5x B       Ther Ex                        HEP        Education        LTR  20x5s 20x5s 20x5s Hold 5, 2x10    Pelvic tilts     Hold 5, 2x10    Sciatic nerve glide     X10 ea   Hook BOBBY str  3x30s B - modified with increased hip flex 3x30s B into figure 4 on L  3x30s B    Hook cross body glut str  3x30s L only as right provoked groin pain  3x30s B (provokes left LE symptoms but recovers)    Seated trunk rot 15x gentle

## 2024-01-09 ENCOUNTER — OFFICE VISIT (OUTPATIENT)
Dept: PHYSICAL THERAPY | Facility: CLINIC | Age: 34
End: 2024-01-09
Payer: COMMERCIAL

## 2024-01-09 DIAGNOSIS — M54.50 BILATERAL LOW BACK PAIN, UNSPECIFIED CHRONICITY, UNSPECIFIED WHETHER SCIATICA PRESENT: ICD-10-CM

## 2024-01-09 DIAGNOSIS — Z98.890 STATUS POST LUMBAR DISCECTOMY: Primary | ICD-10-CM

## 2024-01-09 PROCEDURE — 97110 THERAPEUTIC EXERCISES: CPT | Performed by: PHYSICAL THERAPIST

## 2024-01-09 PROCEDURE — 97112 NEUROMUSCULAR REEDUCATION: CPT | Performed by: PHYSICAL THERAPIST

## 2024-01-09 NOTE — PROGRESS NOTES
Daily Note     Today's date: 2024  Patient name: Peter Nguyen  : 1990  MRN: [id number]  Referring provider: Farshad Contreras MD  Dx:   Encounter Diagnosis     ICD-10-CM    1. Status post lumbar discectomy  Z98.890       2. Bilateral low back pain, unspecified chronicity, unspecified whether sciatica present  M54.50                      Subjective: Peter Nguyen reports he had a lot of  muscle soreness in quads after last session for a day or two like a good workout.  He continues with intermittent left foot numbness which is worse with static standing.      Objective: See treatment diary below      Assessment: Tolerated treatment well. Patient demonstrated fatigue post treatment while improving lumbar rotational movement.  He demos left plantar foot numbness that is provoked more with WB through foot in both standing posture or hooklying.  This was also provoked during calf stretching and demos possible distal nerve or plantar aspect vs spinal related.  Further assessment to continue.      Plan: Continue per plan of care.      Precautions: SP Discectomy    Access Code: [email or web address]  URL: [email or web address]  Date: 2023  Prepared by: Jorge Cash    Exercises  - Supine Lower Trunk Rotation  - 1-2 x daily - 7 x weekly - 2 sets - 10 reps  - Supine Posterior Pelvic Tilt  - 1-2 x daily - 7 x weekly - 2 sets - 10 reps - 5 hold  - Supine 90/90 Sciatic Nerve Glide with Knee Flexion/Extension  - 1-2 x daily - 7 x weekly - 2 sets - 10 reps  - Seated Slump Nerve Glide  - 1-2 x daily - 7 x weekly - 1 sets - 10 reps  - Supine Gluteus Stretch  - 2-3 x daily - 7 x weekly - 3 sets - 30 hold  - Supine Bridge  - 1 x daily - 7 x weekly - 1-2 sets - 10 reps     Insurance:  AMA/CMS Eval/ Re-eval Auth #/ Referral # Total units  Start date  Expiration date Extension  Visit limitation?  PT only or  PT+OT? Co-Insurance   CMS 23 NA       10% co  OOP met                 POC Start Date POC  Expiration Date Signed POC?   12/18/23 2/12/24 Pend      Date               Visits/Units:  Used               Authed:  Remaining                       Manuals 1/9 1/4 1/2 12/29 12/27    7 6                              Neuro Re-Ed        SL open book str 10x5s ea B       Pallof press with trunk rot Dipt[place] 2.0  10x B       TA+ add    Hold 5, 2x10     TA +abd    Hold 5, 2x10     TA with marches    Hold 5, 2x10     Prone glute set    H/L 10x2 hold 5    Bridge w/ abd stab    10x2 10x   Sup sciatic nerve tensioner   Knee flex +PF into knee ext +PF  10x B Knee flex +PF into knee ext +PF  10x B Knee flex +PF into knee ext +PF  10x B   Sup sciatic nerve tensioner   Knee ext + DF - 10x B     Seated slump    W/cervical extension  10x W/cervical extension  10x   Clamshells    10x2    Seated slump nerve tensioner   Slump w/ CS ext + knee ext and PF  10x into opposite Slump w/ CS ext + knee ext and PF  10x into opposite Slump w/ CS ext + knee ext and PF  10x into opposite   Seated sciatic tensioner CS flex/slouch/knee flex/DF  2x10 B CS ext + knee ext into CS flex/slouch/knee flex  2x10 B CS ext + knee ext into CS flex/slouch/knee flex  10x B     STS  Training + 10x      Squat lift mechanics  10lbs dumbbell on end from sitting position  2x10      SLS on/off horse  10x/5x B      Ther Ex                        HEP        Education        LTR   20x5s 20x5s 20x5s   Pelvic tilts APT/PPT 15x5s       Stand gastroc str 2x30s B        Hook BOBBY str   3x30s B - modified with increased hip flex 3x30s B into figure 4 on L  3x30s B   Hook cross body glut str   3x30s L only as right provoked groin pain  3x30s B (provokes left LE symptoms but recovers)   Seated trunk rot  15x gentle

## 2024-01-11 ENCOUNTER — APPOINTMENT (OUTPATIENT)
Dept: PHYSICAL THERAPY | Facility: CLINIC | Age: 34
End: 2024-01-11
Payer: COMMERCIAL

## 2024-01-16 ENCOUNTER — OFFICE VISIT (OUTPATIENT)
Dept: PHYSICAL THERAPY | Facility: CLINIC | Age: 34
End: 2024-01-16
Payer: COMMERCIAL

## 2024-01-16 DIAGNOSIS — Z98.890 STATUS POST LUMBAR DISCECTOMY: Primary | ICD-10-CM

## 2024-01-16 DIAGNOSIS — M54.50 BILATERAL LOW BACK PAIN, UNSPECIFIED CHRONICITY, UNSPECIFIED WHETHER SCIATICA PRESENT: ICD-10-CM

## 2024-01-16 PROCEDURE — 97112 NEUROMUSCULAR REEDUCATION: CPT | Performed by: PHYSICAL THERAPIST

## 2024-01-17 NOTE — PROGRESS NOTES
Daily Note     Today's date: 2024  Patient name: Peter Nguyen  : 1990  MRN: [id number]  Referring provider: Farshad Contreras MD  Dx:   Encounter Diagnosis     ICD-10-CM    1. Status post lumbar discectomy  Z98.890       2. Bilateral low back pain, unspecified chronicity, unspecified whether sciatica present  M54.50                      Subjective: Peter Nguyen reports yesterday was most amount of LLE radicular symptoms, left low back pain he has experienced in a while but not sure of cause.  He also continues to notice increase of left foot numbness when statically standing but immediate decrease with lifting left foot to unload plantar aspect of foot.  He also continues to notice when his left low back/hip area is sore it can be worsened when turning neck towards the left.      Objective: See treatment diary below      Assessment: Tolerated treatment fair. Patient showed good understanding of proprioceptive training of foot positioning on airex pad to increase dispersion of force by reducing pronation on left foot and increasing recruitment of surrounding muscle groups.  He also demod improved CS left rot with decreased left LBP following fwd lunge on left LE with foot on chair to elongate posterior left hip soft tissue and neurodynamics.      Plan: Continue per plan of care.  Pt to continue with fwd lunge to reach eccentric loading of posterior hip musculature into end range hip flexion to reduce provocation of pain with left CS rotation from poor neurodynamics.     Precautions: SP Discectomy    Access Code: [email or web address]  URL: [email or web address]  Date: 2023  Prepared by: Jorge Cash    Exercises  - Supine Lower Trunk Rotation  - 1-2 x daily - 7 x weekly - 2 sets - 10 reps  - Supine Posterior Pelvic Tilt  - 1-2 x daily - 7 x weekly - 2 sets - 10 reps - 5 hold  - Supine 90/90 Sciatic Nerve Glide with Knee Flexion/Extension  - 1-2 x daily - 7 x weekly - 2 sets - 10  reps  - Seated Slump Nerve Glide  - 1-2 x daily - 7 x weekly - 1 sets - 10 reps  - Supine Gluteus Stretch  - 2-3 x daily - 7 x weekly - 3 sets - 30 hold  - Supine Bridge  - 1 x daily - 7 x weekly - 1-2 sets - 10 reps     Insurance:  AMA/CMS Eval/ Re-eval Auth #/ Referral # Total units  Start date  Expiration date Extension  Visit limitation?  PT only or  PT+OT? Co-Insurance   CMS 12/18/23 NA       10% co  OOP met                 POC Start Date POC Expiration Date Signed POC?   12/18/23 2/12/24 Pend      Date               Visits/Units:  Used               Authed:  Remaining                       Manuals 1/16 1/9 1/4 1/2 12/29    8 7 6                             Neuro Re-Ed        SL open book str  10x5s ea B      Pallof press with trunk rot  [place] 2.0  10x B      TA+ add     Hold 5, 2x10    TA +abd     Hold 5, 2x10    TA with marches     Hold 5, 2x10    Prone glute set     H/L 10x2 hold 5   Bridge w/ abd stab     10x2   Sup sciatic nerve tensioner    Knee flex +PF into knee ext +PF  10x B Knee flex +PF into knee ext +PF  10x B   Sup sciatic nerve tensioner    Knee ext + DF - 10x B    Seated slump     W/cervical extension  10x   Clamshells     10x2   Seated slump nerve tensioner    Slump w/ CS ext + knee ext and PF  10x into opposite Slump w/ CS ext + knee ext and PF  10x into opposite   Seated sciatic tensioner  CS flex/slouch/knee flex/DF  2x10 B CS ext + knee ext into CS flex/slouch/knee flex  2x10 B CS ext + knee ext into CS flex/slouch/knee flex  10x B    STS   Training + 10x     Squat lift mechanics   10lbs dumbbell on end from sitting position  2x10     Fwd lunge LLE on chair w/focus on end range hip flexion depth -   3x5 (decreased left LBP with rotation following)       Ice skaters On airex - initial training for even wt distribution +  2x10 5s B       Wobble Board TAPS ML+AP  30x ea       SLS on/off horse Seated abd stab on/off horse 2x10 B  10x/5x B     Ther Ex                        HEP        Education         LTR    20x5s 20x5s   Pelvic tilts  APT/PPT 15x5s      Stand gastroc str  2x30s B       Hook BOBBY str    3x30s B - modified with increased hip flex 3x30s B into figure 4 on L    Hook cross body glut str    3x30s L only as right provoked groin pain    Seated trunk rot   15x gentle

## 2024-01-18 ENCOUNTER — OFFICE VISIT (OUTPATIENT)
Dept: PHYSICAL THERAPY | Facility: CLINIC | Age: 34
End: 2024-01-18
Payer: COMMERCIAL

## 2024-01-18 DIAGNOSIS — F41.9 ANXIETY: ICD-10-CM

## 2024-01-18 DIAGNOSIS — Z98.890 STATUS POST LUMBAR DISCECTOMY: Primary | ICD-10-CM

## 2024-01-18 DIAGNOSIS — M54.50 BILATERAL LOW BACK PAIN, UNSPECIFIED CHRONICITY, UNSPECIFIED WHETHER SCIATICA PRESENT: ICD-10-CM

## 2024-01-18 PROCEDURE — 97110 THERAPEUTIC EXERCISES: CPT

## 2024-01-18 PROCEDURE — 97140 MANUAL THERAPY 1/> REGIONS: CPT

## 2024-01-18 PROCEDURE — 97112 NEUROMUSCULAR REEDUCATION: CPT

## 2024-01-18 NOTE — TELEPHONE ENCOUNTER
Medication Refill Request     Name ALPRAZolam (XANAX) 0.25 mg tablet   Dose/Frequency   Take 1 tablet (0.25 mg total) by mouth 2 (two) times a day as needed for anxiety  Quantity 60  Verified pharmacy   [x]  Verified ordering Provider   [x]  Does patient have enough for the next 3 days? Yes [] No [x]

## 2024-01-18 NOTE — PROGRESS NOTES
Daily Note     Today's date: 2024  Patient name: Peter Nguyen  : 1990  MRN: [id number]  Referring provider: Farshad Contreras MD  Dx:   Encounter Diagnoses   Name Primary?    Status post lumbar discectomy Yes    Bilateral low back pain, unspecified chronicity, unspecified whether sciatica present                   Subjective: Pt reports a deep ache in left glute and continued tingling in left foot.      Objective: See treatment diary below      Assessment: Pt tolerated treatment well with minimal complaints of pain. Pt presents with tenderness upon palpation of left piriformis and glute origin, decreased with accupressure. C/o pain resolved after release. Pt was instructed on self muscular releases using a tennis ball and demonstrates understanding.       Plan: Continue with plan of care to decrease pain, improve mobility, strength, and function.          Precautions: SP Discectomy    Access Code: [email or web address]  URL: [email or web address]  Date: 2023  Prepared by: Jorge Cash    Exercises  - Supine Lower Trunk Rotation  - 1-2 x daily - 7 x weekly - 2 sets - 10 reps  - Supine Posterior Pelvic Tilt  - 1-2 x daily - 7 x weekly - 2 sets - 10 reps - 5 hold  - Supine 90/90 Sciatic Nerve Glide with Knee Flexion/Extension  - 1-2 x daily - 7 x weekly - 2 sets - 10 reps  - Seated Slump Nerve Glide  - 1-2 x daily - 7 x weekly - 1 sets - 10 reps  - Supine Gluteus Stretch  - 2-3 x daily - 7 x weekly - 3 sets - 30 hold  - Supine Bridge  - 1 x daily - 7 x weekly - 1-2 sets - 10 reps     Insurance:  AMA/CMS Eval/ Re-eval Auth #/ Referral # Total units  Start date  Expiration date Extension  Visit limitation?  PT only or  PT+OT? Co-Insurance   CMS 23 NA       10% co  OOP met                 POC Start Date POC Expiration Date Signed POC?   23 Pend      Date               Visits/Units:  Used               Authed:  Remaining                       Manuals      9 8 7 6    STM Left piriformis and glute origin release                       Neuro Re-Ed        SL open book str   10x5s ea B     Pallof press with trunk rot   [place] 2.0  10x B     TA+ add        TA +abd        TA with marches        Prone glute set        Bridge w/ abd stab        Sup sciatic nerve tensioner     Knee flex +PF into knee ext +PF  10x B   Sup sciatic nerve tensioner     Knee ext + DF - 10x B   Seated slump        Clamshells        Seated slump nerve tensioner With foot EV and INV x10 ea    Slump w/ CS ext + knee ext and PF  10x into opposite   Seated sciatic tensioner   CS flex/slouch/knee flex/DF  2x10 B CS ext + knee ext into CS flex/slouch/knee flex  2x10 B CS ext + knee ext into CS flex/slouch/knee flex  10x B   STS    Training + 10x    Squat lift mechanics    10lbs dumbbell on end from sitting position  2x10    Fwd lunge LLE on chair w/focus on end range hip flexion depth -   3x5  LLE on chair w/focus on end range hip flexion depth -   3x5 (decreased left LBP with rotation following)      Ice skaters  On airex - initial training for even wt distribution +  2x10 5s B      Wobble Board  TAPS ML+AP  30x ea      SLS on/off horse Seated abd stab on/off horse 2x10 B Seated abd stab on/off horse 2x10 B  10x/5x B    Ther Ex                        HEP        Education Tennis ball release       LTR     20x5s   Pelvic tilts   APT/PPT 15x5s     Stand gastroc str Prostretch Hold 10, x10   2x30s B      Hook BOBBY str     3x30s B - modified with increased hip flex   Hook cross body glut str Hold 10, 1x10 left    3x30s L only as right provoked groin pain   Seated trunk rot    15x gentle

## 2024-01-22 ENCOUNTER — OFFICE VISIT (OUTPATIENT)
Dept: PHYSICAL THERAPY | Facility: CLINIC | Age: 34
End: 2024-01-22
Payer: COMMERCIAL

## 2024-01-22 DIAGNOSIS — M54.50 BILATERAL LOW BACK PAIN, UNSPECIFIED CHRONICITY, UNSPECIFIED WHETHER SCIATICA PRESENT: ICD-10-CM

## 2024-01-22 DIAGNOSIS — Z98.890 STATUS POST LUMBAR DISCECTOMY: Primary | ICD-10-CM

## 2024-01-22 PROCEDURE — 97112 NEUROMUSCULAR REEDUCATION: CPT

## 2024-01-22 NOTE — PROGRESS NOTES
Daily Note     Today's date: 2024  Patient name: Peter Nguyen  : 1990  MRN: [id number]  Referring provider: Farshad Contreras MD  Dx:   Encounter Diagnosis     ICD-10-CM    1. Status post lumbar discectomy  Z98.890       2. Bilateral low back pain, unspecified chronicity, unspecified whether sciatica present  M54.50           Start Time: 1620  Stop Time: 1700  Total time in clinic (min): 40 minutes    Subjective: Pt reports that there are no significant changes in the numbness in his L foot. He feels better after his last treatment session with decreased tension in his L glutes, feels he is improved with the motion he is able to get to. Continues to be compliant with his HEP. Has no numbness in his foot when he is not WB through it.       Objective: See treatment diary below      Assessment: Tolerated treatment well. Patient exhibited good technique with therapeutic exercises and would benefit from continued PT. Pt continues to demonstrate LE tightness and nerve tension throughout his LLE, continues to benefit from LE stretches and neurodynamic exercises. Pt exhibited inferior patellar pain during supine sciatic glides and any loaded knee flexion. Pt instructed on inferior patellar glides in sitting to help reduce patellar tendon pain.       Plan: Continue per plan of care.  Progress treatment as tolerated.       Precautions: SP Discectomy    Access Code: [email or web address]  URL: [email or web address]  Date: 2023  Prepared by: Jorge Cash    Exercises  - Supine Lower Trunk Rotation  - 1-2 x daily - 7 x weekly - 2 sets - 10 reps  - Supine Posterior Pelvic Tilt  - 1-2 x daily - 7 x weekly - 2 sets - 10 reps - 5 hold  - Supine 90/90 Sciatic Nerve Glide with Knee Flexion/Extension  - 1-2 x daily - 7 x weekly - 2 sets - 10 reps  - Seated Slump Nerve Glide  - 1-2 x daily - 7 x weekly - 1 sets - 10 reps  - Supine Gluteus Stretch  - 2-3 x daily - 7 x weekly - 3 sets - 30 hold  - Supine  Bridge  - 1 x daily - 7 x weekly - 1-2 sets - 10 reps     Insurance:  AMA/CMS Eval/ Re-eval Auth #/ Referral # Total units  Start date  Expiration date Extension  Visit limitation?  PT only or  PT+OT? Co-Insurance   CMS 12/18/23 NA       10% co  OOP met                 POC Start Date POC Expiration Date Signed POC?   12/18/23 2/12/24 Pend      Date               Visits/Units:  Used               Authed:  Remaining                       Manuals 1/22 1/18 1/16 1/9 1/4 1/2    10 9 8 7 6    STM  Left piriformis and glute origin release                         Neuro Re-Ed         SL open book str    10x5s ea B     Pallof press with trunk rot    Dipti 2.0  10x B     TA+ add         TA +abd         TA with marches         Prone glute set         Bridge w/ abd stab         Sup sciatic nerve tensioner      Knee flex +PF into knee ext +PF  10x B   Sup sciatic nerve tensioner      Knee ext + DF - 10x B   Seated slump         Clamshells         Seated slump nerve tensioner 1x10 B    1x10 B w/foot EV/INV 10x With foot EV and INV x10 ea    Slump w/ CS ext + knee ext and PF  10x into opposite   Seated sciatic tensioner Supine sciatic tensionr 3x10 B   CS flex/slouch/knee flex/DF  2x10 B CS ext + knee ext into CS flex/slouch/knee flex  2x10 B CS ext + knee ext into CS flex/slouch/knee flex  10x B   STS     Training + 10x    Squat lift mechanics     10lbs dumbbell on end from sitting position  2x10    Fwd lunge LLE on chair w/focus on end range hip flexion depth- 3x5 B LLE on chair w/focus on end range hip flexion depth -   3x5  LLE on chair w/focus on end range hip flexion depth -   3x5 (decreased left LBP with rotation following)      Biodex Weight shifting static- AP & ML 2x10 ea        Ice skaters   On airex - initial training for even wt distribution +  2x10 5s B      Wobble Board TAPS ML+AP 30x ea  TAPS ML+AP  30x ea      SLS on/off horse  Seated abd stab on/off horse 2x10 B Seated abd stab on/off horse 2x10 B  10x/5x B     Ther Ex                           HEP         Education  Tennis ball release       LTR      20x5s   Pelvic tilts    APT/PPT 15x5s     Stand gastroc str  Prostretch Hold 10, x10   2x30s B      Hook BOBBY str      3x30s B - modified with increased hip flex   Hook cross body glut str 10x10s LLE Hold 10, 1x10 left    3x30s L only as right provoked groin pain   Seated trunk rot     15x gentle

## 2024-01-24 RX ORDER — ALPRAZOLAM 0.25 MG/1
0.25 TABLET ORAL 2 TIMES DAILY PRN
Qty: 60 TABLET | Refills: 0 | Status: SHIPPED | OUTPATIENT
Start: 2024-01-24

## 2024-01-25 ENCOUNTER — APPOINTMENT (OUTPATIENT)
Dept: PHYSICAL THERAPY | Facility: CLINIC | Age: 34
End: 2024-01-25
Payer: COMMERCIAL

## 2024-01-30 ENCOUNTER — OFFICE VISIT (OUTPATIENT)
Dept: PHYSICAL THERAPY | Facility: CLINIC | Age: 34
End: 2024-01-30
Payer: COMMERCIAL

## 2024-01-30 DIAGNOSIS — M54.50 BILATERAL LOW BACK PAIN, UNSPECIFIED CHRONICITY, UNSPECIFIED WHETHER SCIATICA PRESENT: ICD-10-CM

## 2024-01-30 DIAGNOSIS — Z98.890 STATUS POST LUMBAR DISCECTOMY: Primary | ICD-10-CM

## 2024-01-30 PROCEDURE — 97140 MANUAL THERAPY 1/> REGIONS: CPT

## 2024-01-30 PROCEDURE — 97112 NEUROMUSCULAR REEDUCATION: CPT

## 2024-01-30 NOTE — PROGRESS NOTES
Daily Note     Today's date: 2024  Patient name: Peter Nguyen  : 1990  MRN: [id number]  Referring provider: Farshad Contreras MD  Dx:   Encounter Diagnosis     ICD-10-CM    1. Status post lumbar discectomy  Z98.890       2. Bilateral low back pain, unspecified chronicity, unspecified whether sciatica present  M54.50             Start Time: 1615  Stop Time: 1700  Total time in clinic (min): 45 minutes    Subjective: Pt reports since Saturday he woke up with increased L/S pain, notes he is anxious he did something wrong secondary to increased low back pain similar to which he had prior to surgery. Pt reports he has been very mindful of mechanics and protecting his back. Pt reports Saturday symptoms increased to 5/10, today symptoms are 2/10.       Objective: See treatment diary below      Assessment: Pt ttp along incision, educated about introducing new movement to L/S can cause irritation, and secondary to patient reports of improving pain, continue to modify activity and be aware of mechanics. Pt verbalized understanding.   Plan: Continue per plan of care.  Progress treatment as tolerated.       Precautions: SP Discectomy    Access Code: [email or web address]  URL: [email or web address]  Date: 2023  Prepared by: Jorge Cash    Exercises  - Supine Lower Trunk Rotation  - 1-2 x daily - 7 x weekly - 2 sets - 10 reps  - Supine Posterior Pelvic Tilt  - 1-2 x daily - 7 x weekly - 2 sets - 10 reps - 5 hold  - Supine 90/90 Sciatic Nerve Glide with Knee Flexion/Extension  - 1-2 x daily - 7 x weekly - 2 sets - 10 reps  - Seated Slump Nerve Glide  - 1-2 x daily - 7 x weekly - 1 sets - 10 reps  - Supine Gluteus Stretch  - 2-3 x daily - 7 x weekly - 3 sets - 30 hold  - Supine Bridge  - 1 x daily - 7 x weekly - 1-2 sets - 10 reps     Insurance:  AMA/CMS Eval/ Re-eval Auth #/ Referral # Total units  Start date  Expiration date Extension  Visit limitation?  PT only or  PT+OT? Co-Insurance   CMS  12/18/23 NA       10% co  OOP met                 POC Start Date POC Expiration Date Signed POC?   12/18/23 2/12/24 Pend      Date               Visits/Units:  Used               Authed:  Remaining                       Manuals 1/30 1/22 1/18 1/16 1/9 1/4 1/2    11 10 9 8 7 6    STM Scar tissue, L QL, LS paraspinals L   Left piriformis and glute origin release       PA Grade 1-2 L/S         Long [place] traction L LE         MET L L hip add         Neuro Re-Ed          SL open book str     10x5s ea B     Pallof press with trunk rot     Dipti 2.0  10x B     TA+ add          TA +abd          TA with marches          Prone glute set          Bridge w/ abd stab 4x10         Sup sciatic nerve tensioner       Knee flex +PF into knee ext +PF  10x B   Sup sciatic nerve tensioner       Knee ext + DF - 10x B   Seated slump          Clamshells          Seated slump nerve tensioner  1x10 B    1x10 B w/foot EV/INV 10x With foot EV and INV x10 ea    Slump w/ CS ext + knee ext and PF  10x into opposite   Seated sciatic tensioner  Supine sciatic tensionr 3x10 B   CS flex/slouch/knee flex/DF  2x10 B CS ext + knee ext into CS flex/slouch/knee flex  2x10 B CS ext + knee ext into CS flex/slouch/knee flex  10x B   STS 20x     Training + 10x    Squat lift mechanics      10lbs dumbbell on end from sitting position  2x10    Fwd lunge  LLE on chair w/focus on end range hip flexion depth- 3x5 B LLE on chair w/focus on end range hip flexion depth -   3x5  LLE on chair w/focus on end range hip flexion depth -   3x5 (decreased left LBP with rotation following)      Biodex  Weight shifting static- AP & ML 2x10 ea        Ice skaters    On airex - initial training for even wt distribution +  2x10 5s B      Wobble Board  TAPS ML+AP 30x ea  TAPS ML+AP  30x ea      SLS on/off horse   Seated abd stab on/off horse 2x10 B Seated abd stab on/off horse 2x10 B  10x/5x B    Ther Ex                              HEP          Education   Tennis ball release        LTR       20x5s   Pelvic tilts     APT/PPT 15x5s     Stand gastroc str   Prostretch Hold 10, x10   2x30s B      Hook BOBBY str       3x30s B - modified with increased hip flex   Hook cross body glut str 10x10s LLE 10x10s LLE Hold 10, 1x10 left    3x30s L only as right provoked groin pain   Seated trunk rot      15x gentle

## 2024-02-01 ENCOUNTER — OFFICE VISIT (OUTPATIENT)
Dept: PHYSICAL THERAPY | Facility: CLINIC | Age: 34
End: 2024-02-01
Payer: COMMERCIAL

## 2024-02-01 DIAGNOSIS — M54.50 BILATERAL LOW BACK PAIN, UNSPECIFIED CHRONICITY, UNSPECIFIED WHETHER SCIATICA PRESENT: ICD-10-CM

## 2024-02-01 DIAGNOSIS — Z98.890 STATUS POST LUMBAR DISCECTOMY: Primary | ICD-10-CM

## 2024-02-01 PROCEDURE — 97112 NEUROMUSCULAR REEDUCATION: CPT

## 2024-02-01 PROCEDURE — 97110 THERAPEUTIC EXERCISES: CPT

## 2024-02-01 PROCEDURE — 97140 MANUAL THERAPY 1/> REGIONS: CPT

## 2024-02-01 NOTE — PROGRESS NOTES
Daily Note     Today's date: 2024  Patient name: Peter Nguyen  : 1990  MRN: [id number]  Referring provider: Farshad Contreras MD  Dx:   Encounter Diagnosis     ICD-10-CM    1. Status post lumbar discectomy  Z98.890       2. Bilateral low back pain, unspecified chronicity, unspecified whether sciatica present  M54.50               Start Time: 1530  Stop Time: 1615  Total time in clinic (min): 45 minutes    Subjective: Pt reports symptoms have remained in L/S, noted increase pain when waking yesterday. Pt notes 3/10 in L/S, currently       Objective: See treatment diary below      Assessment: Pt demonstrates significant restriction throughout B QL and gluts. Pt (+) long sit, corrected with MET. Pt educated to perform gentle stretches, heat and tennis ball for soft tissue release along gluts.     Plan: Continue per plan of care.  Progress treatment as tolerated.       Precautions: SP Discectomy    Access Code: [email or web address]  URL: [email or web address]  Date: 2023  Prepared by: Jorge Cash    Exercises  - Supine Lower Trunk Rotation  - 1-2 x daily - 7 x weekly - 2 sets - 10 reps  - Supine Posterior Pelvic Tilt  - 1-2 x daily - 7 x weekly - 2 sets - 10 reps - 5 hold  - Supine 90/90 Sciatic Nerve Glide with Knee Flexion/Extension  - 1-2 x daily - 7 x weekly - 2 sets - 10 reps  - Seated Slump Nerve Glide  - 1-2 x daily - 7 x weekly - 1 sets - 10 reps  - Supine Gluteus Stretch  - 2-3 x daily - 7 x weekly - 3 sets - 30 hold  - Supine Bridge  - 1 x daily - 7 x weekly - 1-2 sets - 10 reps     Insurance:  AMA/CMS Eval/ Re-eval Auth #/ Referral # Total units  Start date  Expiration date Extension  Visit limitation?  PT only or  PT+OT? Co-Insurance   CMS 23 NA       10% co  OOP met                 POC Start Date POC Expiration Date Signed POC?   23 Pend      Date               Visits/Units:  Used               Authed:  Remaining                       Manuals   1/18 1/16 1/9 1/4 1/2    12 11 10 9 8 7 6    STM MFR B QL STM glut med Scar tissue, L QL, LS paraspinals L   Left piriformis and glute origin release       PA  Grade 1-2 L/S         Long [place] traction  L LE         MET L B hip ext L hip add         Neuro Re-Ed           SL open book str      10x5s ea B     Pallof press with trunk rot      Dipti 2.0  10x B     TA+ add           TA +abd           TA with marches           Prone glute set           Bridge w/ abd stab  4x10         Sup sciatic nerve tensioner        Knee flex +PF into knee ext +PF  10x B   Sup sciatic nerve tensioner        Knee ext + DF - 10x B   Seated slump           Clamshells           Seated slump nerve tensioner   1x10 B    1x10 B w/foot EV/INV 10x With foot EV and INV x10 ea    Slump w/ CS ext + knee ext and PF  10x into opposite   Seated sciatic tensioner Supine sciatic tensionr 3x10 B  Supine sciatic tensionr 3x10 B   CS flex/slouch/knee flex/DF  2x10 B CS ext + knee ext into CS flex/slouch/knee flex  2x10 B CS ext + knee ext into CS flex/slouch/knee flex  10x B   STS  20x     Training + 10x    Squat lift mechanics       10lbs dumbbell on end from sitting position  2x10    Fwd lunge   LLE on chair w/focus on end range hip flexion depth- 3x5 B LLE on chair w/focus on end range hip flexion depth -   3x5  LLE on chair w/focus on end range hip flexion depth -   3x5 (decreased left LBP with rotation following)      Biodex   Weight shifting static- AP & ML 2x10 ea        Ice skaters     On airex - initial training for even wt distribution +  2x10 5s B      Wobble Board   TAPS ML+AP 30x ea  TAPS ML+AP  30x ea      SLS on/off horse    Seated abd stab on/off horse 2x10 B Seated abd stab on/off horse 2x10 B  10x/5x B    Ther Ex                                 HEP           Education    Tennis ball release       LTR 2x10       20x5s   Pelvic tilts APT/PPT 15x5s     APT/PPT 15x5s     Stand gastroc str    Prostretch Hold 10, x10   2x30s GIOVANNI ROSALES  "str 3x20\"       3x30s B - modified with increased hip flex   Hook cross body glut str 10x10s LLE 10x10s LLE 10x10s LLE Hold 10, 1x10 left    3x30s L only as right provoked groin pain   Seated trunk rot       15x gentle                                               "

## 2024-02-06 ENCOUNTER — EVALUATION (OUTPATIENT)
Dept: PHYSICAL THERAPY | Facility: CLINIC | Age: 34
End: 2024-02-06
Payer: COMMERCIAL

## 2024-02-06 DIAGNOSIS — M54.50 BILATERAL LOW BACK PAIN, UNSPECIFIED CHRONICITY, UNSPECIFIED WHETHER SCIATICA PRESENT: ICD-10-CM

## 2024-02-06 DIAGNOSIS — Z98.890 STATUS POST LUMBAR DISCECTOMY: Primary | ICD-10-CM

## 2024-02-06 PROCEDURE — 97112 NEUROMUSCULAR REEDUCATION: CPT

## 2024-02-06 PROCEDURE — 97140 MANUAL THERAPY 1/> REGIONS: CPT

## 2024-02-06 PROCEDURE — 97110 THERAPEUTIC EXERCISES: CPT

## 2024-02-06 NOTE — LETTER
2024    Farshad Contreras MD  [address] [place] [id number]    Patient: Peter Nguyen   YOB: 1990   Date of Visit: 2024     Encounter Diagnosis     ICD-10-CM    1. Status post lumbar discectomy  Z98.890       2. Bilateral low back pain, unspecified chronicity, unspecified whether sciatica present  M54.50           Dear Dr. Contreras:    Thank you for your recent referral of Peter Nguyen. Please review the attached evaluation summary from Peter's recent visit.     Please verify that you agree with the plan of care by signing the attached order.     If you have any questions or concerns, please do not hesitate to call.     I sincerely appreciate the opportunity to share in the care of one of your patients and hope to have another opportunity to work with you in the near future.       Sincerely,    Yessica Lu, PT      Referring Provider:      I certify that I have read the below Plan of Care and certify the need for these services furnished under this plan of treatment while under my care.                    Farshad Contreras MD  [address] [place], [place] [id number]  Via Fax: [phone]          PT Re-Evaluation     Today's date: 2024  Patient name: Peter Nguyen  : 1990  MRN: [id number]  Referring provider: Farshad Contreras MD  Dx:   Encounter Diagnosis     ICD-10-CM    1. Status post lumbar discectomy  Z98.890       2. Bilateral low back pain, unspecified chronicity, unspecified whether sciatica present  M54.50                      Assessment  Assessment details: Peter Nguyen is a 33 y.o. male who has been seen in physical therapy for 13 visits secondary to the diagnosis of Status post lumbar discectomy  (primary encounter diagnosis), and Bilateral low back pain, unspecified chronicity, unspecified whether sciatica present and is making steady gains towards established goals. The patient has  Approver for [facility] pancreatic cancer   C25 9 demonstrated decreased pain level, improved lower extremity strength, improved core stabilization, improved postural awareness, reduced gait deviations, improved spinal ROM, improved lower extremity ROM, improved flexibility, and improved balance. As a result, he has been able to increase functional activities such as return to work on light duty . He would benefit from continued PT services to address remaining impairments outlined in Progress Note and to maximize function.       Impairments: abnormal gait, abnormal or restricted ROM, activity intolerance, impaired physical strength, lacks appropriate home exercise program, pain with function, poor posture  and poor body mechanics    Symptom irritability: moderateUnderstanding of Dx/Px/POC: good   Prognosis: good    Goals  Short term goals to be accomplished in 2-4weeks:  STG 1: Pt will demo independence with postural management -met  STG 2: Pt will demo I with HEP to maximize progress between therapy sessions-met  STG 3: Pt will demo L/S AROM < or = min loss throughout to promote improved functional mobility and body mechanics-met  STG 4: Pt will demo 1/2 MMT grade core stabilizers to improve lumbar stability with functional challenges-not met  STG 5: Pt will reports pain dec freq and intensity 50%-met  STG 6: Pt will deny sleep disturbance -partially met    Long term goals to be accomplished in 4-8 weeks: ONGOING  LTG 1: Pt will demo good body mech with >75% functional challenges to prevent reinjury  LTG 2: Pt will be able to return to standing activity for >1 hour free as per PLOF  LTG 3: Pt will demo Good strength core stabilizers to promote carryover with body mech and posture    Plan  Plan details: HEP development, stretching, strengthening, A/AA/PROM, joint mobilizations, posture education, body mechanics training for bending and lifting, STM/MI as needed to reduce muscle tension, balance and proprioceptive training, muscle reeducation, PLOC discussed and  agreed upon with patient.      Patient would benefit from: skilled physical therapy  Planned modality interventions: cryotherapy and thermotherapy: hydrocollator packs  Planned therapy interventions: manual therapy, neuromuscular re-education, self care, therapeutic activities, therapeutic exercise, home exercise program, body mechanics training, patient education, postural training, strengthening and stretching  Frequency: 2x week  Duration in weeks: 8  Treatment plan discussed with: patient        Subjective Evaluation    History of Present Illness  Mechanism of injury: surgery  Mechanism of injury: Pt reports he feels like he's at 75% of his overall function. States he continues with discomfort in his low back and intermittent left foot numbness and tingling. Continued difficulties with getting in and out of car, prolonged sitting greater than 45 minutes, lifting, and performing higher level activities. At his recent post op follow up, he was instructed to avoid lifting greater than 35 pounds for another 2 weeks. Has reported improvements in sleep over this past week. Numbness in left foot increases with weight bearing.     Patient Goals  Patient goals for therapy: decreased pain, independence with ADLs/IADLs and return to sport/leisure activities (ongoing)    Pain  Current pain ratin  At best pain ratin  At worst pain ratin  Location: low back and left foot  Quality: tight, throbbing and dull ache  Relieving factors: change in position  Aggravating factors: lifting, sitting and standing    Social Support  Steps to enter house: yes  Stairs in house: yes   Lives in: multiple-level home  Lives with: spouse    Employment status: working ([employer])  Hand dominance: right        Objective     Concurrent Complaints  Positive for disturbed sleep. Negative for bladder dysfunction, bowel dysfunction and saddle (S4) numbness    Postural Observations  Seated posture: fair  Standing posture:  fair      Palpation   Left   Hypertonic in the quadratus lumborum.   Tenderness of the lumbar paraspinals and quadratus lumborum.     Right   Hypertonic in the quadratus lumborum.   Tenderness of the lumbar paraspinals and quadratus lumborum.     Additional Palpation Details  Moderate TTP at left piriformis and glute origin    Tenderness     Lumbar Spine  No tenderness in the spinous process.     Active Range of Motion     Lumbar   Flexion: 90 (%) degrees  with pain  Extension: 75 (%) degrees   Left lateral flexion: 90 (%) degrees     Right lateral flexion: 90 (%) degrees   Left rotation: 75 (%) degrees   Right rotation: 90 (%) degrees     Joint Play     Hypomobile: L4 and L5     Strength/Myotome Testing     Left Hip   Planes of Motion   Flexion: 5  External rotation: 5  Internal rotation: 5    Right Hip   Planes of Motion   Flexion: 5  External rotation: 4+  Internal rotation: 4    Left Knee   Flexion: 5  Extension: 5    Right Knee   Flexion: 5  Extension: 5    Left Ankle/Foot   Dorsiflexion: 5  Plantar flexion: 5    Right Ankle/Foot   Dorsiflexion: 5  Plantar flexion: 5    Muscle Activation   Patient unable to activate left transverse abdominals and right transverse abdominals.     Tests     Lumbar     Left   Negative passive SLR and slump test.     Right   Negative passive SLR and slump test.     Left Pelvic Girdle/Sacrum   Positive: active SLR test.     Right Pelvic Girdle/Sacrum   Positive: active SLR test.     Left Hip   Positive long sit.   Negative BOBBY and FADIR.     Right Hip   Positive long sit.   Negative BOBBY and FADIR.     Ambulation     Observational Gait     Additional Observational Gait Details  Patient ambulating with guarded pattern, reduced trunk rotation and decreased arm swing.    Functional Assessment      Squat    Left tibial anterior translation beyond toes and right tibial anterior translation beyond toes.     Single Leg Stance   Left: 30 (decreased stability) seconds  Right: 30  seconds    General Comments:      Hip Comments   WNL      Flowsheet Rows      Flowsheet Row Most Recent Value   PT/OT G-Codes    Current Score 69   Projected Score 60   FOTO information reviewed Yes                 Precautions: SP Discectomy    Access Code: [email or web address]  URL: [email or web address]  Date: 12/27/2023  Prepared by: Jorge Cash    Exercises  - Supine Lower Trunk Rotation  - 1-2 x daily - 7 x weekly - 2 sets - 10 reps  - Supine Posterior Pelvic Tilt  - 1-2 x daily - 7 x weekly - 2 sets - 10 reps - 5 hold  - Supine 90/90 Sciatic Nerve Glide with Knee Flexion/Extension  - 1-2 x daily - 7 x weekly - 2 sets - 10 reps  - Seated Slump Nerve Glide  - 1-2 x daily - 7 x weekly - 1 sets - 10 reps  - Supine Gluteus Stretch  - 2-3 x daily - 7 x weekly - 3 sets - 30 hold  - Supine Bridge  - 1 x daily - 7 x weekly - 1-2 sets - 10 reps     Insurance:  A/CMS Eval/ Re-eval Auth #/ Referral # Total units  Start date  Expiration date Extension  Visit limitation?  PT only or  PT+OT? Co-Insurance   CMS 12/18/23 NA       10% co  OOP met                 POC Start Date POC Expiration Date Signed POC?   12/18/23 2/12/24 Pend      Date               Visits/Units:  Used               Authed:  Remaining                       Manuals 2/6 2/1 1/30 1/22 1/18 1/16    13 12 11 10 9 8   STM MFR B QL STM glut med MFR B QL STM glut med Scar tissue, L QL, LS paraspinals L   Left piriformis and glute origin release    PA Grade 1-2 L/S  Grade 1-2 L/S      Long [place] traction L LE  L LE      MET Left post innominate B hip ext L hip add      Neuro Re-Ed         SL open book str         Pallof press with trunk rot         TA+ add Hold 5, 2x10         TA +abd Hold 5, 2x10         TA with marches         Prone glute set         Bridge w/ abd stab   4x10      Sup sciatic nerve tensioner         Sup sciatic nerve tensioner         Seated slump         Clamshells         Seated slump nerve tensioner    1x10 B    1x10 B w/foot EV/INV 10x  "With foot EV and INV x10 ea    Seated sciatic tensioner  Supine sciatic tensionr 3x10 B  Supine sciatic tensionr 3x10 B     STS   20x      Squat lift mechanics         Fwd lunge    LLE on chair w/focus on end range hip flexion depth- 3x5 B LLE on chair w/focus on end range hip flexion depth -   3x5  LLE on chair w/focus on end range hip flexion depth -   3x5 (decreased left LBP with rotation following)   Biodex    Weight shifting static- AP & ML 2x10 ea     Ice skaters      On airex - initial training for even wt distribution +  2x10 5s B   Wobble Board    TAPS ML+AP 30x ea  TAPS ML+AP  30x ea   SLS on/off horse     Seated abd stab on/off horse 2x10 B Seated abd stab on/off horse 2x10 B   Ther Ex                           HEP Updated and reviewed        Education     Tennis ball release    LTR  2x10       Pelvic tilts  APT/PPT 15x5s       Stand gastroc str     Prostretch Hold 10, x10     Hook BOBBY str  3x20\"       Hook cross body glut str  10x10s LLE 10x10s LLE 10x10s LLE Hold 10, 1x10 left    Seated trunk rot                                                   "

## 2024-02-06 NOTE — PROGRESS NOTES
PT Re-Evaluation     Today's date: 2024  Patient name: Peter Nguyen  : 1990  MRN: [id number]  Referring provider: Farshad Contreras MD  Dx:   Encounter Diagnosis     ICD-10-CM    1. Status post lumbar discectomy  Z98.890       2. Bilateral low back pain, unspecified chronicity, unspecified whether sciatica present  M54.50                      Assessment  Assessment details: Peter Nguyen is a 33 y.o. male who has been seen in physical therapy for 13 visits secondary to the diagnosis of Status post lumbar discectomy  (primary encounter diagnosis), and Bilateral low back pain, unspecified chronicity, unspecified whether sciatica present and is making steady gains towards established goals. The patient has demonstrated decreased pain level, improved lower extremity strength, improved core stabilization, improved postural awareness, reduced gait deviations, improved spinal ROM, improved lower extremity ROM, improved flexibility, and improved balance. As a result, he has been able to increase functional activities such as return to work on light duty . He would benefit from continued PT services to address remaining impairments outlined in Progress Note and to maximize function.       Impairments: abnormal gait, abnormal or restricted ROM, activity intolerance, impaired physical strength, lacks appropriate home exercise program, pain with function, poor posture  and poor body mechanics    Symptom irritability: moderateUnderstanding of Dx/Px/POC: good   Prognosis: good    Goals  Short term goals to be accomplished in 2-4weeks:  STG 1: Pt will demo independence with postural management -met  STG 2: Pt will demo I with HEP to maximize progress between therapy sessions-met  STG 3: Pt will demo L/S AROM < or = min loss throughout to promote improved functional mobility and body mechanics-met  STG 4: Pt will demo 1/2 MMT grade core stabilizers to improve lumbar stability with functional  challenges-not met  STG 5: Pt will reports pain dec freq and intensity 50%-met  STG 6: Pt will deny sleep disturbance -partially met    Long term goals to be accomplished in 4-8 weeks: ONGOING  LTG 1: Pt will demo good body mech with >75% functional challenges to prevent reinjury  LTG 2: Pt will be able to return to standing activity for >1 hour free as per PLOF  LTG 3: Pt will demo Good strength core stabilizers to promote carryover with body mech and posture    Plan  Plan details: HEP development, stretching, strengthening, A/AA/PROM, joint mobilizations, posture education, body mechanics training for bending and lifting, STM/MI as needed to reduce muscle tension, balance and proprioceptive training, muscle reeducation, PLOC discussed and agreed upon with patient.      Patient would benefit from: skilled physical therapy  Planned modality interventions: cryotherapy and thermotherapy: hydrocollator packs  Planned therapy interventions: manual therapy, neuromuscular re-education, self care, therapeutic activities, therapeutic exercise, home exercise program, body mechanics training, patient education, postural training, strengthening and stretching  Frequency: 2x week  Duration in weeks: 8  Treatment plan discussed with: patient        Subjective Evaluation    History of Present Illness  Mechanism of injury: surgery  Mechanism of injury: Pt reports he feels like he's at 75% of his overall function. States he continues with discomfort in his low back and intermittent left foot numbness and tingling. Continued difficulties with getting in and out of car, prolonged sitting greater than 45 minutes, lifting, and performing higher level activities. At his recent post op follow up, he was instructed to avoid lifting greater than 35 pounds for another 2 weeks. Has reported improvements in sleep over this past week. Numbness in left foot increases with weight bearing.     Patient Goals  Patient goals for therapy: decreased  pain, independence with ADLs/IADLs and return to sport/leisure activities (ongoing)    Pain  Current pain ratin  At best pain ratin  At worst pain ratin  Location: low back and left foot  Quality: tight, throbbing and dull ache  Relieving factors: change in position  Aggravating factors: lifting, sitting and standing    Social Support  Steps to enter house: yes  Stairs in house: yes   Lives in: multiple-level home  Lives with: spouse    Employment status: working ([employer])  Hand dominance: right        Objective     Concurrent Complaints  Positive for disturbed sleep. Negative for bladder dysfunction, bowel dysfunction and saddle (S4) numbness    Postural Observations  Seated posture: fair  Standing posture: fair      Palpation   Left   Hypertonic in the quadratus lumborum.   Tenderness of the lumbar paraspinals and quadratus lumborum.     Right   Hypertonic in the quadratus lumborum.   Tenderness of the lumbar paraspinals and quadratus lumborum.     Additional Palpation Details  Moderate TTP at left piriformis and glute origin    Tenderness     Lumbar Spine  No tenderness in the spinous process.     Active Range of Motion     Lumbar   Flexion: 90 (%) degrees  with pain  Extension: 75 (%) degrees   Left lateral flexion: 90 (%) degrees     Right lateral flexion: 90 (%) degrees   Left rotation: 75 (%) degrees   Right rotation: 90 (%) degrees     Joint Play     Hypomobile: L4 and L5     Strength/Myotome Testing     Left Hip   Planes of Motion   Flexion: 5  External rotation: 5  Internal rotation: 5    Right Hip   Planes of Motion   Flexion: 5  External rotation: 4+  Internal rotation: 4    Left Knee   Flexion: 5  Extension: 5    Right Knee   Flexion: 5  Extension: 5    Left Ankle/Foot   Dorsiflexion: 5  Plantar flexion: 5    Right Ankle/Foot   Dorsiflexion: 5  Plantar flexion: 5    Muscle Activation   Patient unable to activate left transverse abdominals and right transverse abdominals.     Tests      Lumbar     Left   Negative passive SLR and slump test.     Right   Negative passive SLR and slump test.     Left Pelvic Girdle/Sacrum   Positive: active SLR test.     Right Pelvic Girdle/Sacrum   Positive: active SLR test.     Left Hip   Positive long sit.   Negative BOBBY and FADIR.     Right Hip   Positive long sit.   Negative BOBBY and FADIR.     Ambulation     Observational Gait     Additional Observational Gait Details  Patient ambulating with guarded pattern, reduced trunk rotation and decreased arm swing.    Functional Assessment      Squat    Left tibial anterior translation beyond toes and right tibial anterior translation beyond toes.     Single Leg Stance   Left: 30 (decreased stability) seconds  Right: 30 seconds    General Comments:      Hip Comments   WNL      Flowsheet Rows      Flowsheet Row Most Recent Value   PT/OT G-Codes    Current Score 69   Projected Score 60   FOTO information reviewed Yes                 Precautions: SP Discectomy    Access Code: [email or web address]  URL: [email or web address]  Date: 12/27/2023  Prepared by: Jorge Cash    Exercises  - Supine Lower Trunk Rotation  - 1-2 x daily - 7 x weekly - 2 sets - 10 reps  - Supine Posterior Pelvic Tilt  - 1-2 x daily - 7 x weekly - 2 sets - 10 reps - 5 hold  - Supine 90/90 Sciatic Nerve Glide with Knee Flexion/Extension  - 1-2 x daily - 7 x weekly - 2 sets - 10 reps  - Seated Slump Nerve Glide  - 1-2 x daily - 7 x weekly - 1 sets - 10 reps  - Supine Gluteus Stretch  - 2-3 x daily - 7 x weekly - 3 sets - 30 hold  - Supine Bridge  - 1 x daily - 7 x weekly - 1-2 sets - 10 reps     Insurance:  AMA/CMS Eval/ Re-eval Auth #/ Referral # Total units  Start date  Expiration date Extension  Visit limitation?  PT only or  PT+OT? Co-Insurance   CMS 12/18/23 NA       10% co  OOP met                 POC Start Date POC Expiration Date Signed POC?   12/18/23 2/12/24 Pend      Date               Visits/Units:  Used               Authed:   "Remaining                       Manuals 2/6 2/1 1/30 1/22 1/18 1/16    13 12 11 10 9 8   STM MFR B QL STM glut med MFR B QL STM glut med Scar tissue, L QL, LS paraspinals L   Left piriformis and glute origin release    PA Grade 1-2 L/S  Grade 1-2 L/S      Long [place] traction L LE  L LE      MET Left post innominate B hip ext L hip add      Neuro Re-Ed         SL open book str         Pallof press with trunk rot         TA+ add Hold 5, 2x10         TA +abd Hold 5, 2x10         TA with marches         Prone glute set         Bridge w/ abd stab   4x10      Sup sciatic nerve tensioner         Sup sciatic nerve tensioner         Seated slump         Clamshells         Seated slump nerve tensioner    1x10 B    1x10 B w/foot EV/INV 10x With foot EV and INV x10 ea    Seated sciatic tensioner  Supine sciatic tensionr 3x10 B  Supine sciatic tensionr 3x10 B     STS   20x      Squat lift mechanics         Fwd lunge    LLE on chair w/focus on end range hip flexion depth- 3x5 B LLE on chair w/focus on end range hip flexion depth -   3x5  LLE on chair w/focus on end range hip flexion depth -   3x5 (decreased left LBP with rotation following)   Biodex    Weight shifting static- AP & ML 2x10 ea     Ice skaters      On airex - initial training for even wt distribution +  2x10 5s B   Wobble Board    TAPS ML+AP 30x ea  TAPS ML+AP  30x ea   SLS on/off horse     Seated abd stab on/off horse 2x10 B Seated abd stab on/off horse 2x10 B   Ther Ex                           HEP Updated and reviewed        Education     Tennis ball release    LTR  2x10       Pelvic tilts  APT/PPT 15x5s       Stand gastroc str     Prostretch Hold 10, x10     Hook BOBBY str  3x20\"       Hook cross body glut str  10x10s LLE 10x10s LLE 10x10s LLE Hold 10, 1x10 left    Seated trunk rot                                   "

## 2024-02-08 ENCOUNTER — OFFICE VISIT (OUTPATIENT)
Dept: PHYSICAL THERAPY | Facility: CLINIC | Age: 34
End: 2024-02-08
Payer: COMMERCIAL

## 2024-02-08 DIAGNOSIS — Z98.890 STATUS POST LUMBAR DISCECTOMY: Primary | ICD-10-CM

## 2024-02-08 PROCEDURE — 97112 NEUROMUSCULAR REEDUCATION: CPT

## 2024-02-08 PROCEDURE — 97140 MANUAL THERAPY 1/> REGIONS: CPT

## 2024-02-08 NOTE — PROGRESS NOTES
Daily Note     Today's date: 2024  Patient name: Peter Nguyen  : 1990  MRN: [id number]  Referring provider: Farshad Contreras MD  Dx:   Encounter Diagnosis   Name Primary?    Status post lumbar discectomy Yes                  Subjective: Pt reports improvement in low back pain the morning following PT session. Numbness in left foot remains unchanged.      Objective: See treatment diary below      Assessment: Progressed pt's program per treatment diary and demonstrates good tolerance. Able to tolerate progression of core strength and stability exercises.given cueing for proper engagement of TA independent of breathing.      Plan: Continue with plan of care to decrease pain, improve mobility, strength, and function.          Precautions: SP Discectomy    Access Code: [email or web address]  URL: [email or web address]  Date: 2023  Prepared by: Jorge Cash    Exercises  - Supine Lower Trunk Rotation  - 1-2 x daily - 7 x weekly - 2 sets - 10 reps  - Supine Posterior Pelvic Tilt  - 1-2 x daily - 7 x weekly - 2 sets - 10 reps - 5 hold  - Supine 90/90 Sciatic Nerve Glide with Knee Flexion/Extension  - 1-2 x daily - 7 x weekly - 2 sets - 10 reps  - Seated Slump Nerve Glide  - 1-2 x daily - 7 x weekly - 1 sets - 10 reps  - Supine Gluteus Stretch  - 2-3 x daily - 7 x weekly - 3 sets - 30 hold  - Supine Bridge  - 1 x daily - 7 x weekly - 1-2 sets - 10 reps     Insurance:  AMA/CMS Eval/ Re-eval Auth #/ Referral # Total units  Start date  Expiration date Extension  Visit limitation?  PT only or  PT+OT? Co-Insurance   CMS 23 NA       10% co  OOP met                 POC Start Date POC Expiration Date Signed POC?   23 Pend      Date               Visits/Units:  Used               Authed:  Remaining                       Manuals     14 13 12 11 10   STM MFR B QL STM glut med MFR B QL STM glut med MFR B QL STM glut med Scar tissue, L QL, LS paraspinals L     PA  "Grade 1-2 L/S Grade 1-2 L/S  Grade 1-2 L/S    Long [place] traction L LE L LE  L LE    MET  Left post innominate B hip ext L hip add    Neuro Re-Ed        SL open book str        Shoulder ext with TA Hold 5, 2x10 yellow       TA+ add Hold 5, 2x10  Hold 5, 2x10       TA +abd Hold 5, 2x10  Hold 5, 2x10       TA with marches Hold 5, 2x10        Prone glute set        Bridge w/ abd stab    4x10    Sup sciatic nerve tensioner        Sup sciatic nerve tensioner        Seated slump        Clamshells        Seated slump nerve tensioner     1x10 B    1x10 B w/foot EV/INV 10x   Seated sciatic tensioner   Supine sciatic tensionr 3x10 B  Supine sciatic tensionr 3x10 B   STS    20x    Squat lift mechanics        Fwd lunge     LLE on chair w/focus on end range hip flexion depth- 3x5 B   Biodex     Weight shifting static- AP & ML 2x10 ea   Ice skaters        Wobble Board     TAPS ML+AP 30x ea   SLS on/off horse        Ther Ex                        HEP  Updated and reviewed      Education        LTR   2x10     Pelvic tilts   APT/PPT 15x5s     Stand gastroc str        Hook BOBBY str   3x20\"     Hook cross body glut str   10x10s LLE 10x10s LLE 10x10s LLE   Seated trunk rot                                      "

## 2024-02-13 ENCOUNTER — APPOINTMENT (OUTPATIENT)
Dept: PHYSICAL THERAPY | Facility: CLINIC | Age: 34
End: 2024-02-13
Payer: COMMERCIAL

## 2024-02-15 ENCOUNTER — OFFICE VISIT (OUTPATIENT)
Dept: PHYSICAL THERAPY | Facility: CLINIC | Age: 34
End: 2024-02-15
Payer: COMMERCIAL

## 2024-02-15 DIAGNOSIS — Z98.890 STATUS POST LUMBAR DISCECTOMY: Primary | ICD-10-CM

## 2024-02-15 DIAGNOSIS — M54.50 BILATERAL LOW BACK PAIN, UNSPECIFIED CHRONICITY, UNSPECIFIED WHETHER SCIATICA PRESENT: ICD-10-CM

## 2024-02-15 PROCEDURE — 97112 NEUROMUSCULAR REEDUCATION: CPT

## 2024-02-15 PROCEDURE — 97140 MANUAL THERAPY 1/> REGIONS: CPT

## 2024-02-15 NOTE — PROGRESS NOTES
Daily Note     Today's date: 2/15/2024  Patient name: Peter Nguyen  : 1990  MRN: [id number]  Referring provider: Farshad Contreras MD  Dx:   Encounter Diagnoses   Name Primary?    Status post lumbar discectomy Yes    Bilateral low back pain, unspecified chronicity, unspecified whether sciatica present                   Subjective: Pt reports 4/10 left calf tightness since yesterday. Improved discomfort waking up in the morning.       Objective: See treatment diary below      Assessment: Progressed pt's program per treatment diary and demonstrates good tolerance. Pt with decreased calf tightness rated 2/10 after glute muscle releases. Instructed to continue with tennis ball releases at home. Fatigued at end of session.       Plan: Continue with plan of care to decrease pain, improve mobility, strength, and function.          Precautions: SP Discectomy    Access Code: [email or web address]  URL: [email or web address]  Date: 2023  Prepared by: Jorge Cash    Exercises  - Supine Lower Trunk Rotation  - 1-2 x daily - 7 x weekly - 2 sets - 10 reps  - Supine Posterior Pelvic Tilt  - 1-2 x daily - 7 x weekly - 2 sets - 10 reps - 5 hold  - Supine 90/90 Sciatic Nerve Glide with Knee Flexion/Extension  - 1-2 x daily - 7 x weekly - 2 sets - 10 reps  - Seated Slump Nerve Glide  - 1-2 x daily - 7 x weekly - 1 sets - 10 reps  - Supine Gluteus Stretch  - 2-3 x daily - 7 x weekly - 3 sets - 30 hold  - Supine Bridge  - 1 x daily - 7 x weekly - 1-2 sets - 10 reps     Insurance:  AMA/CMS Eval/ Re-eval Auth #/ Referral # Total units  Start date  Expiration date Extension  Visit limitation?  PT only or  PT+OT? Co-Insurance   CMS 23 NA       10% co  OOP met                 POC Start Date POC Expiration Date Signed POC?   23 Pend      Date               Visits/Units:  Used               Authed:  Remaining                       Manuals 2/8 2/6 2/1 1/30 2/15    14 13 12 11 15   STM MFR B QL STM  "glut med MFR B QL STM glut med MFR B QL STM glut med Scar tissue, L QL, LS paraspinals L  MFR B QL STM glut med   PA Grade 1-2 L/S Grade 1-2 L/S  Grade 1-2 L/S Grade 1-2 L/S   Long [place] traction L LE L LE  L LE    MET  Left post innominate B hip ext L hip add    Neuro Re-Ed        Weighted carries     10 lb 2 laps ea side   Shoulder ext with TA Hold 5, 2x10 yellow    Hold 5, 2x10 yellow   TA+ add Hold 5, 2x10  Hold 5, 2x10    Hold 5, 2x10 alternating   TA +abd Hold 5, 2x10  Hold 5, 2x10       TA with marches Hold 5, 2x10        Prone glute set        Bridge w/ abd stab    4x10 2x10   Sup sciatic nerve tensioner        Sup sciatic nerve tensioner        Seated slump        Clamshells        Seated slump nerve tensioner        Seated sciatic tensioner   Supine sciatic tensionr 3x10 B     STS    20x    Squat lift mechanics        Fwd lunge        Wheelright        Ice skaters        Wobble Board        SLS on/off horse        Ther Ex                        HEP  Updated and reviewed      Education        LTR   2x10     Pelvic tilts   APT/PPT 15x5s     Stand gastroc str        Hook BOBBY str   3x20\"  Piriformis stretch hold 10 x5   Hook cross body glut str   10x10s LLE 10x10s LLE    Seated trunk rot                                         "

## 2024-02-20 ENCOUNTER — OFFICE VISIT (OUTPATIENT)
Dept: PHYSICAL THERAPY | Facility: CLINIC | Age: 34
End: 2024-02-20
Payer: COMMERCIAL

## 2024-02-20 DIAGNOSIS — Z98.890 STATUS POST LUMBAR DISCECTOMY: Primary | ICD-10-CM

## 2024-02-20 DIAGNOSIS — M54.50 BILATERAL LOW BACK PAIN, UNSPECIFIED CHRONICITY, UNSPECIFIED WHETHER SCIATICA PRESENT: ICD-10-CM

## 2024-02-20 PROCEDURE — 97112 NEUROMUSCULAR REEDUCATION: CPT

## 2024-02-20 PROCEDURE — 97140 MANUAL THERAPY 1/> REGIONS: CPT

## 2024-02-20 PROCEDURE — 97110 THERAPEUTIC EXERCISES: CPT

## 2024-02-20 NOTE — PROGRESS NOTES
Daily Note     Today's date: 2024  Patient name: Peter Nguyen  : 1990  MRN: [id number]  Referring provider: Farshad Contreras MD  Dx:   Encounter Diagnoses   Name Primary?    Status post lumbar discectomy Yes    Bilateral low back pain, unspecified chronicity, unspecified whether sciatica present                   Subjective: Pt reports 2/10 discomfort in low back. States that he has noticed decreased intensity of numbness/tingling in left foot.       Objective: See treatment diary below      Assessment: Progressed pt's program per treatment diary and demonstrates good tolerance. Pt demonstrating decreased spasms in QL and glute musculature. Able to tolerate progression of core stability exercises with no increase in LBP.       Plan: Continue with plan of care to decrease pain, improve mobility, strength, and function.          Precautions: SP Discectomy    Access Code: [email or web address]  URL: [email or web address]  Date: 2023  Prepared by: Jorge Cash    Exercises  - Supine Lower Trunk Rotation  - 1-2 x daily - 7 x weekly - 2 sets - 10 reps  - Supine Posterior Pelvic Tilt  - 1-2 x daily - 7 x weekly - 2 sets - 10 reps - 5 hold  - Supine 90/90 Sciatic Nerve Glide with Knee Flexion/Extension  - 1-2 x daily - 7 x weekly - 2 sets - 10 reps  - Seated Slump Nerve Glide  - 1-2 x daily - 7 x weekly - 1 sets - 10 reps  - Supine Gluteus Stretch  - 2-3 x daily - 7 x weekly - 3 sets - 30 hold  - Supine Bridge  - 1 x daily - 7 x weekly - 1-2 sets - 10 reps     Insurance:  AMA/CMS Eval/ Re-eval Auth #/ Referral # Total units  Start date  Expiration date Extension  Visit limitation?  PT only or  PT+OT? Co-Insurance   CMS 23 NA       10% co  OOP met                 POC Start Date POC Expiration Date Signed POC?   23 Pend      Date               Visits/Units:  Used               Authed:  Remaining                       Manuals 2/8 2/6 2/1 2/20 2/15    14 13 12 16 15   [facility] MFR B  "QL STM glut med MFR B QL STM glut med MFR B QL STM glut med MFR B QL STM glut med MFR B QL STM glut med   PA Grade 1-2 L/S Grade 1-2 L/S  Grade 1-2 L/S Grade 1-2 L/S   Long [place] traction L LE L LE      MET  Left post innominate B hip ext     Neuro Re-Ed        Weighted carries    15 lb 3 laps ea side 10 lb 2 laps ea side   Shoulder ext with TA Hold 5, 2x10 yellow   + march Hold 5, 2x10 yellow Hold 5, 2x10 yellow   TA+ add Hold 5, 2x10  Hold 5, 2x10   Hold 5, 2x10 alternating Hold 5, 2x10 alternating   TA +abd Hold 5, 2x10  Hold 5, 2x10       TA with marches Hold 5, 2x10        Prone glute set        Bridge w/ abd stab    On Pball x8 2x10   Pallof press    [place] 1.5 x4 ea    Seated sciatic tensioner   Supine sciatic tensionr 3x10 B     Ther Ex                        HEP  Updated and reviewed      Education        LTR   2x10     Pelvic tilts   APT/PPT 15x5s     Stand gastroc str        Hook BOBBY str   3x20\" Piriformis stretch hold 10 x10 Piriformis stretch hold 10 x5   Hook cross body glut str   10x10s LLE     Seated trunk rot                                            "

## 2024-02-22 ENCOUNTER — OFFICE VISIT (OUTPATIENT)
Dept: PHYSICAL THERAPY | Facility: CLINIC | Age: 34
End: 2024-02-22
Payer: COMMERCIAL

## 2024-02-22 DIAGNOSIS — M54.50 BILATERAL LOW BACK PAIN, UNSPECIFIED CHRONICITY, UNSPECIFIED WHETHER SCIATICA PRESENT: ICD-10-CM

## 2024-02-22 DIAGNOSIS — Z98.890 STATUS POST LUMBAR DISCECTOMY: Primary | ICD-10-CM

## 2024-02-22 PROCEDURE — 97110 THERAPEUTIC EXERCISES: CPT

## 2024-02-22 PROCEDURE — 97140 MANUAL THERAPY 1/> REGIONS: CPT

## 2024-02-22 PROCEDURE — 97112 NEUROMUSCULAR REEDUCATION: CPT

## 2024-02-22 NOTE — PROGRESS NOTES
Daily Note     Today's date: 2024  Patient name: Peter Nguyen  : 1990  MRN: [id number]  Referring provider: Farshad Contreras MD  Dx:   Encounter Diagnoses   Name Primary?    Status post lumbar discectomy Yes    Bilateral low back pain, unspecified chronicity, unspecified whether sciatica present                   Subjective: Pt reports tightness is back today. Minimal pain in the morning when waking up.       Objective: See treatment diary below      Assessment: Pt tolerated treatment well with no complaints of pain. Progressing well with core stabilization and engagement of TA throughout session. He would benefit from a progression of general LE strengthening.       Plan: Continue with plan of care to decrease pain, improve mobility, strength, and function.          Precautions: SP Discectomy    Access Code: [email or web address]  URL: [email or web address]  Date: 2023  Prepared by: Jorge Cash    Exercises  - Supine Lower Trunk Rotation  - 1-2 x daily - 7 x weekly - 2 sets - 10 reps  - Supine Posterior Pelvic Tilt  - 1-2 x daily - 7 x weekly - 2 sets - 10 reps - 5 hold  - Supine 90/90 Sciatic Nerve Glide with Knee Flexion/Extension  - 1-2 x daily - 7 x weekly - 2 sets - 10 reps  - Seated Slump Nerve Glide  - 1-2 x daily - 7 x weekly - 1 sets - 10 reps  - Supine Gluteus Stretch  - 2-3 x daily - 7 x weekly - 3 sets - 30 hold  - Supine Bridge  - 1 x daily - 7 x weekly - 1-2 sets - 10 reps     Insurance:  AMA/CMS Eval/ Re-eval Auth #/ Referral # Total units  Start date  Expiration date Extension  Visit limitation?  PT only or  PT+OT? Co-Insurance   CMS 23 NA       10% co  OOP met                 POC Start Date POC Expiration Date Signed POC?   23 Pend      Date               Visits/Units:  Used               Authed:  Remaining                       Manuals 2/8 2/6 2/22 2/20 2/15    14 13 17 16 15   STM MFR B QL STM glut med MFR B QL STM glut med MFR B QL STM glut med  "MFR B QL STM glut med MFR B QL STM glut med   PA Grade 1-2 L/S Grade 1-2 L/S Grade 1-2 L/S Grade 1-2 L/S Grade 1-2 L/S   Long [place] traction L LE L LE      MET  Left post innominate      Neuro Re-Ed        Weighted carries    15 lb 3 laps ea side 10 lb 2 laps ea side   Shoulder ext with TA Hold 5, 2x10 yellow   + march Hold 5, 2x10 yellow Hold 5, 2x10 yellow   TA+ add Hold 5, 2x10  Hold 5, 2x10  Hold 5, 2x10 alternating Hold 5, 2x10 alternating Hold 5, 2x10 alternating   TA +abd Hold 5, 2x10  Hold 5, 2x10       TA with marches Hold 5, 2x10   Hold 5, 2x10      Prone glute set        Bridge w/ abd stab   On Pball x17 On Pball x8 2x10   Pallof press    [place] 1.5 x4 ea    Seated sciatic tensioner   Supine sciatic tensionr 3x10 B     squats        Ther Ex                        HEP        Education  Tennis ball release      LTR   2x10     Pelvic tilts   APT/PPT 15x5s     Stand gastroc str        Hook BOBBY str  Piriformis stretch hold 10 x10 3x20\" Piriformis stretch hold 10 x10 Piriformis stretch hold 10 x5   Hook cross body glut str   10x10s LLE     Seated trunk rot                                               "

## 2024-02-23 ENCOUNTER — HOSPITAL ENCOUNTER (EMERGENCY)
Facility: HOSPITAL | Age: 34
Discharge: HOME | End: 2024-02-23
Attending: EMERGENCY MEDICINE
Payer: COMMERCIAL

## 2024-02-23 VITALS
TEMPERATURE: 97.6 F | DIASTOLIC BLOOD PRESSURE: 71 MMHG | BODY MASS INDEX: 34.87 KG/M2 | HEART RATE: 71 BPM | WEIGHT: 250 LBS | SYSTOLIC BLOOD PRESSURE: 116 MMHG | RESPIRATION RATE: 18 BRPM | OXYGEN SATURATION: 96 %

## 2024-02-23 DIAGNOSIS — R42 LIGHTHEADEDNESS: Primary | ICD-10-CM

## 2024-02-23 LAB
ANION GAP SERPL CALCULATED.3IONS-SCNC: 7 MMOL/L
ATRIAL RATE: 79 BPM
BUN SERPL-MCNC: 19 MG/DL (ref 5–25)
CALCIUM SERPL-MCNC: 8.8 MG/DL (ref 8.4–10.2)
CHLORIDE SERPL-SCNC: 103 MMOL/L (ref 96–108)
CO2 SERPL-SCNC: 27 MMOL/L (ref 21–32)
CREAT SERPL-MCNC: 0.84 MG/DL (ref 0.6–1.3)
ERYTHROCYTE [DISTWIDTH] IN BLOOD BY AUTOMATED COUNT: 12.4 % (ref 11.6–15.1)
GFR SERPL CREATININE-BSD FRML MDRD: 115 ML/MIN/1.73SQ M
GLUCOSE SERPL-MCNC: 87 MG/DL (ref 65–140)
HCT VFR BLD AUTO: 39.3 % (ref 36.5–49.3)
HGB BLD-MCNC: 13.7 G/DL (ref 12–17)
MCH RBC QN AUTO: 28.5 PG (ref 26.8–34.3)
MCHC RBC AUTO-ENTMCNC: 34.9 G/DL (ref 31.4–37.4)
MCV RBC AUTO: 82 FL (ref 82–98)
P AXIS: 67 DEGREES
PLATELET # BLD AUTO: 197 THOUSANDS/UL (ref 149–390)
PMV BLD AUTO: 10.7 FL (ref 8.9–12.7)
POTASSIUM SERPL-SCNC: 3.6 MMOL/L (ref 3.5–5.3)
PR INTERVAL: 170 MS
QRS AXIS: 36 DEGREES
QRSD INTERVAL: 102 MS
QT INTERVAL: 364 MS
QTC INTERVAL: 417 MS
RBC # BLD AUTO: 4.81 MILLION/UL (ref 3.88–5.62)
SODIUM SERPL-SCNC: 137 MMOL/L (ref 135–147)
T WAVE AXIS: 32 DEGREES
VENTRICULAR RATE: 79 BPM
WBC # BLD AUTO: 7.04 THOUSAND/UL (ref 4.31–10.16)

## 2024-02-23 PROCEDURE — 36415 COLL VENOUS BLD VENIPUNCTURE: CPT | Performed by: EMERGENCY MEDICINE

## 2024-02-23 PROCEDURE — 85027 COMPLETE CBC AUTOMATED: CPT | Performed by: EMERGENCY MEDICINE

## 2024-02-23 PROCEDURE — 99284 EMERGENCY DEPT VISIT MOD MDM: CPT | Performed by: EMERGENCY MEDICINE

## 2024-02-23 PROCEDURE — 80048 BASIC METABOLIC PNL TOTAL CA: CPT | Performed by: EMERGENCY MEDICINE

## 2024-02-23 PROCEDURE — 93005 ELECTROCARDIOGRAM TRACING: CPT

## 2024-02-23 PROCEDURE — 99285 EMERGENCY DEPT VISIT HI MDM: CPT

## 2024-02-23 NOTE — DISCHARGE INSTRUCTIONS
Your labs and EKG were otherwise unremarkable.  Please follow-up with your primary care doctor.      They can continue any outpatient further testing or discuss further medications.     Return to the ER for Chest pain.

## 2024-02-23 NOTE — Clinical Note
Peter Nguyen was seen and treated in our emergency department on 2/23/2024.                Diagnosis:     Peter  may return to work on return date.    He may return on this date: 02/24/2024         If you have any questions or concerns, please don't hesitate to call.      Mehdi Mir MD    ______________________________           _______________          _______________  Hospital Representative                              Date                                Time

## 2024-02-23 NOTE — ED PROVIDER NOTES
History  Chief Complaint   Patient presents with    Dizziness     Dizzy when going to bed this evening, has been on and off dizzy for the past week, usually associated with a bowel movement or a meal    Shortness of Breath     Woke up this am with shortness of breath, also reports history of anxiety, did take a xanax at 22:00     33-year-old male past medical history significant for panic attacks presenting to ED today with lightheadedness.  Patient states that this been going on for the last 6 months.  He thinks that may be related secondary to abdominal however today he came in because he was having shortness of breath that woke him up out of sleep.  He was unsure if this was his anxiety or not.  No nausea or vomiting.  No chest pain.        Prior to Admission Medications   Prescriptions Last Dose Informant Patient Reported? Taking?   ALPRAZolam (XANAX) 0.25 mg tablet   No No   Sig: Take 1 tablet (0.25 mg total) by mouth 2 (two) times a day as needed for anxiety   Zubsolv 2.9-0.71 MG SUBL  Self Yes No   Sig: in the morning   albuterol (PROVENTIL HFA,VENTOLIN HFA) 90 mcg/act inhaler  Self No No   Sig: Inhale 2 puffs every 4 (four) hours as needed for wheezing   cyclobenzaprine (FLEXERIL) 5 mg tablet  Self Yes No   Sig: Take 5 mg by mouth   fluticasone (FLONASE) 50 mcg/act nasal spray  Self Yes No   Si spray into each nostril daily   lidocaine (LIDODERM) 5 %  Self Yes No   Sig: Apply 1 patch topically daily      Facility-Administered Medications: None       Past Medical History:   Diagnosis Date    Asthma     Carpal tunnel syndrome     Right - Last Assessed: 2016     Exercise-induced asthma     Last Assessed: 2014       Past Surgical History:   Procedure Laterality Date    BACK SURGERY      HIP SURGERY         Family History   Problem Relation Age of Onset    Other Mother         Lymphedema     Substance Abuse Family     No Known Problems Father     No Known Problems Brother     Mental  illness Neg Hx      I have reviewed and agree with the history as documented.    E-Cigarette/Vaping    E-Cigarette Use Former User      E-Cigarette/Vaping Substances    Nicotine No     THC No     CBD No     Flavoring Yes     Other No     Unknown No      Social History     Tobacco Use    Smoking status: Former    Smokeless tobacco: Former    Tobacco comments:     vaping daily since stopped smoking 3 yrs ago   Vaping Use    Vaping status: Former    Substances: Flavoring   Substance Use Topics    Alcohol use: No    Drug use: No       Review of Systems   Constitutional:  Negative for chills and fever.   HENT:  Negative for hearing loss.    Eyes:  Negative for visual disturbance.   Respiratory:  Positive for shortness of breath.    Cardiovascular:  Negative for chest pain.   Gastrointestinal:  Negative for abdominal pain, constipation, diarrhea, nausea and vomiting.   Genitourinary:  Negative for difficulty urinating.   Musculoskeletal:  Negative for myalgias.   Skin:  Negative for rash.   Neurological:  Positive for numbness.   Psychiatric/Behavioral:  Negative for agitation.    All other systems reviewed and are negative.      Physical Exam  Physical Exam  Vitals and nursing note reviewed.   Constitutional:       General: He is not in acute distress.     Appearance: Normal appearance. He is not ill-appearing.   HENT:      Head: Normocephalic and atraumatic.      Right Ear: External ear normal.      Left Ear: External ear normal.      Nose: Nose normal. No congestion.      Mouth/Throat:      Mouth: Mucous membranes are moist.      Pharynx: Oropharynx is clear. No oropharyngeal exudate.   Eyes:      General:         Right eye: No discharge.         Left eye: No discharge.      Extraocular Movements: Extraocular movements intact.      Conjunctiva/sclera: Conjunctivae normal.      Pupils: Pupils are equal, round, and reactive to light.   Cardiovascular:      Rate and Rhythm: Normal rate and regular rhythm.      Pulses:  Normal pulses.      Heart sounds: Normal heart sounds. No murmur heard.  Pulmonary:      Effort: Pulmonary effort is normal. No tachypnea or respiratory distress.      Breath sounds: Normal breath sounds. No stridor. No wheezing.   Abdominal:      General: Abdomen is flat. Bowel sounds are normal. There is no distension.      Palpations: Abdomen is soft.      Tenderness: There is no abdominal tenderness.   Musculoskeletal:         General: No swelling. Normal range of motion.      Cervical back: Normal range of motion. No rigidity.   Skin:     General: Skin is warm and dry.      Capillary Refill: Capillary refill takes less than 2 seconds.   Neurological:      General: No focal deficit present.      Mental Status: He is alert and oriented to person, place, and time. Mental status is at baseline.      Cranial Nerves: No cranial nerve deficit.      Motor: No weakness.      Gait: Gait normal.   Psychiatric:         Mood and Affect: Mood is anxious.         Behavior: Behavior normal.         Vital Signs  ED Triage Vitals   Temperature Pulse Respirations Blood Pressure SpO2   02/23/24 0053 02/23/24 0053 02/23/24 0053 02/23/24 0053 02/23/24 0053   97.6 °F (36.4 °C) 88 20 133/90 100 %      Temp Source Heart Rate Source Patient Position - Orthostatic VS BP Location FiO2 (%)   02/23/24 0053 02/23/24 0053 02/23/24 0130 02/23/24 0053 --   Oral Monitor Sitting Right arm       Pain Score       --                  Vitals:    02/23/24 0053 02/23/24 0115 02/23/24 0130 02/23/24 0145   BP: 133/90  116/71    Pulse: 88 67 69 71   Patient Position - Orthostatic VS:   Sitting          Visual Acuity      ED Medications  Medications - No data to display    Diagnostic Studies  Results Reviewed       Procedure Component Value Units Date/Time    Basic metabolic panel [[id number]] Collected: 02/23/24 0106    Lab Status: Final result Specimen: Blood from Arm, Left Updated: 02/23/24 0131     Sodium 137 mmol/L      Potassium 3.6 mmol/L       Chloride 103 mmol/L      CO2 27 mmol/L      ANION GAP 7 mmol/L      BUN 19 mg/dL      Creatinine 0.84 mg/dL      Glucose 87 mg/dL      Calcium 8.8 mg/dL      eGFR 115 ml/min/1.73sq m     Narrative:      National Kidney Disease Foundation guidelines for Chronic Kidney Disease (CKD):     Stage 1 with normal or high GFR (GFR > 90 mL/min/1.73 square meters)    Stage 2 Mild CKD (GFR = 60-89 mL/min/1.73 square meters)    Stage 3A Moderate CKD (GFR = 45-59 mL/min/1.73 square meters)    Stage 3B Moderate CKD (GFR = 30-44 mL/min/1.73 square meters)    Stage 4 Severe CKD (GFR = 15-29 mL/min/1.73 square meters)    Stage 5 End Stage CKD (GFR <15 mL/min/1.73 square meters)  Note: GFR calculation is accurate only with a steady state creatinine    CBC and Platelet [[id number]]  (Normal) Collected: 02/23/24 0106    Lab Status: Final result Specimen: Blood from Arm, Left Updated: 02/23/24 0111     WBC 7.04 Thousand/uL      RBC 4.81 Million/uL      Hemoglobin 13.7 g/dL      Hematocrit 39.3 %      MCV 82 fL      MCH 28.5 pg      MCHC 34.9 g/dL      RDW 12.4 %      Platelets 197 Thousands/uL      MPV 10.7 fL                    No orders to display              Procedures  ECG 12 Lead Documentation Only    Date/Time: 2/23/2024 1:02 AM    Performed by: Mehdi Mir MD  Authorized by: Mehdi Mir MD    Indications / Diagnosis:  Dizzy  ECG reviewed by me, the ED Provider: yes    Patient location:  ED  Previous ECG:     Previous ECG:  Unavailable    Comparison to cardiac monitor: No    Interpretation:     Interpretation: normal    Rate:     ECG rate assessment: normal    Rhythm:     Rhythm: sinus rhythm    Ectopy:     Ectopy: none    QRS:     QRS axis:  Normal    QRS intervals:  Normal  Conduction:     Conduction: normal    ST segments:     ST segments:  Normal  T waves:     T waves: normal             ED Course  ED Course as of 02/23/24 0206   Fri Feb 23, 2024   0131 Basic metabolic panel  WNL   0131 CBC and Platelet  WNL                                SBIRT 22yo+      Flowsheet Row Most Recent Value   Initial Alcohol Screen: US AUDIT-C     1. How often do you have a drink containing alcohol? 0 Filed at: 02/23/2024 0054   2. How many drinks containing alcohol do you have on a typical day you are drinking?  0 Filed at: 02/23/2024 0054   3a. Male UNDER 65: How often do you have five or more drinks on one occasion? 0 Filed at: 02/23/2024 0054   Audit-C Score 0 Filed at: 02/23/2024 0054   HESHAM: How many times in the past year have you...    Used an illegal drug or used a prescription medication for non-medical reasons? Never Filed at: 02/23/2024 0054                      Medical Decision Making  33-year-old male presenting to ED today with lightheadedness. I will check a BMP for electrolyte abnormalities, will check CBC to evaluate for acute blood loss anemia.  EKG to evaluate for arrhythmias.    Labs and EKG were otherwise unremarkable.    Had long discussion with patient about that this could be possibly worsening of his panic disorder.  I encouraged him to follow-up with his primary care doctor to discuss other maintenance therapy such as SSRI could be something that they consider starting.    Amount and/or Complexity of Data Reviewed  Labs: ordered. Decision-making details documented in ED Course.             Disposition  Final diagnoses:   Lightheadedness     Time reflects when diagnosis was documented in both MDM as applicable and the Disposition within this note       Time User Action Codes Description Comment    2/23/2024  1:31 AM Mehdi Mir Add [R42] Lightheadedness           ED Disposition       ED Disposition   Discharge    Condition   Stable    Date/Time   Fri Feb 23, 2024 0131    Comment   Peter Nguyen discharge to home/self care.                   Follow-up Information       Follow up With Specialties Details Why Contact Info Additional Information    Bhavesh Arroyo MD Family Medicine Schedule an appointment  as soon as possible for a visit in 2 days  [address] [facility] [address]  [phone]       [facility] Emergency Department Emergency Medicine Go to  If symptoms worsen, As needed [address] [facility] [id number] [phone] [facility] Emergency Department, [address], [place], [address]            Discharge Medication List as of 2/23/2024  1:43 AM        CONTINUE these medications which have NOT CHANGED    Details   albuterol (PROVENTIL HFA,VENTOLIN HFA) 90 mcg/act inhaler Inhale 2 puffs every 4 (four) hours as needed for wheezing, Starting Wed 3/8/2023, Normal      ALPRAZolam (XANAX) 0.25 mg tablet Take 1 tablet (0.25 mg total) by mouth 2 (two) times a day as needed for anxiety, Starting Wed 1/24/2024, Normal      cyclobenzaprine (FLEXERIL) 5 mg tablet Take 5 mg by mouth, Starting Sat 11/25/2023, Until Thu 11/30/2023 at 2359, Historical Med      fluticasone (FLONASE) 50 mcg/act nasal spray 1 spray into each nostril daily, Starting Thu 5/7/2015, Historical Med      lidocaine (LIDODERM) 5 % Apply 1 patch topically daily, Starting Sat 11/25/2023, Until Thu 11/30/2023, Historical Med      Zubsolv 2.9-0.71 MG SUBL in the morning, Starting Mon 10/3/2022, Historical Med             No discharge procedures on file.    PDMP Review         Value Time User    PDMP Reviewed  Yes 11/20/2023 10:58 AM Bhavesh Arroyo MD            ED Provider  Electronically Signed by             Mehdi Mir MD  02/23/24 5742

## 2024-02-26 LAB
ATRIAL RATE: 79 BPM
P AXIS: 67 DEGREES
PR INTERVAL: 170 MS
QRS AXIS: 36 DEGREES
QRSD INTERVAL: 102 MS
QT INTERVAL: 364 MS
QTC INTERVAL: 417 MS
T WAVE AXIS: 32 DEGREES
VENTRICULAR RATE: 79 BPM

## 2024-02-26 PROCEDURE — 93010 ELECTROCARDIOGRAM REPORT: CPT | Performed by: INTERNAL MEDICINE

## 2024-02-27 ENCOUNTER — OFFICE VISIT (OUTPATIENT)
Dept: PHYSICAL THERAPY | Facility: CLINIC | Age: 34
End: 2024-02-27
Payer: COMMERCIAL

## 2024-02-27 DIAGNOSIS — Z98.890 STATUS POST LUMBAR DISCECTOMY: Primary | ICD-10-CM

## 2024-02-27 DIAGNOSIS — M54.50 BILATERAL LOW BACK PAIN, UNSPECIFIED CHRONICITY, UNSPECIFIED WHETHER SCIATICA PRESENT: ICD-10-CM

## 2024-02-27 PROCEDURE — 97112 NEUROMUSCULAR REEDUCATION: CPT

## 2024-02-27 PROCEDURE — 97140 MANUAL THERAPY 1/> REGIONS: CPT

## 2024-02-27 NOTE — PROGRESS NOTES
Daily Note     Today's date: 2024  Patient name: Peter Nguyen  : 1990  MRN: [id number]  Referring provider: Farshad Contreras MD  Dx:   Encounter Diagnoses   Name Primary?    Status post lumbar discectomy Yes    Bilateral low back pain, unspecified chronicity, unspecified whether sciatica present                   Subjective: Pt reports 5/10 foot pain today. States that he lifted a handrail with his co-workers on a slippery surface.        Objective: See treatment diary below      Assessment: Pt tolerated treatment well with minimal complaints of pain. Pt with continued left ball of foot pain in weight bearing positions that is relieved upon picking foot up. Unable to change symptoms with lumbar re-testing or nerve tension testing signifying potential peripheral involvement.       Plan: Continue with plan of care to decrease pain, improve mobility, strength, and function. Assess foot pain next session           Precautions: SP Discectomy    Access Code: [email or web address]  URL: [email or web address]  Date: 2023  Prepared by: Jorge Cash    Exercises  - Supine Lower Trunk Rotation  - 1-2 x daily - 7 x weekly - 2 sets - 10 reps  - Supine Posterior Pelvic Tilt  - 1-2 x daily - 7 x weekly - 2 sets - 10 reps - 5 hold  - Supine 90/90 Sciatic Nerve Glide with Knee Flexion/Extension  - 1-2 x daily - 7 x weekly - 2 sets - 10 reps  - Seated Slump Nerve Glide  - 1-2 x daily - 7 x weekly - 1 sets - 10 reps  - Supine Gluteus Stretch  - 2-3 x daily - 7 x weekly - 3 sets - 30 hold  - Supine Bridge  - 1 x daily - 7 x weekly - 1-2 sets - 10 reps     Insurance:  AMA/CMS Eval/ Re-eval Auth #/ Referral # Total units  Start date  Expiration date Extension  Visit limitation?  PT only or  PT+OT? Co-Insurance   CMS 23 NA       10% co  OOP met                 POC Start Date POC Expiration Date Signed POC?   23 Pend      Date               Visits/Units:  Used               Authed:   "Remaining                       Manuals  2/27 2/22 2/20 2/15     18 17 16 15   STM MFR B QL STM glut med MFR B QL STM glut med MFR B QL STM glut med MFR B QL STM glut med MFR B QL STM glut med   PA Grade 1-2 L/S Grade 1-2 L/S Grade 1-2 L/S Grade 1-2 L/S Grade 1-2 L/S   Long [place] traction L LE       MET        Neuro Re-Ed        Weighted carries    15 lb 3 laps ea side 10 lb 2 laps ea side   Shoulder ext with TA Hold 5, 2x10 yellow Hold 5, 2x10 yellow  + march Hold 5, 2x10 yellow Hold 5, 2x10 yellow   TA+ add Hold 5, 2x10  Hold 5, 2x10 alternating Hold 5, 2x10 alternating Hold 5, 2x10 alternating Hold 5, 2x10 alternating   TA +abd Hold 5, 2x10        TA with marches Hold 5, 2x10   Hold 5, 2x10      Prone glute set        Bridge w/ abd stab  On Pball x15 On Pball x17 On Pball x8 2x10   Pallof press  [place] 2.0 x4 ea  [place] 1.5 x4 ea    Seated sciatic tensioner   Supine sciatic tensionr 3x10 B     squats        Ther Ex                        HEP        Education        LTR   2x10     Pelvic tilts   APT/PPT 15x5s     Stand gastroc str        Hook BOBBY str  Piriformis stretch hold 10 x10 3x20\" Piriformis stretch hold 10 x10 Piriformis stretch hold 10 x5   Hook cross body glut str   10x10s LLE     Seated trunk rot                                                  "

## 2024-02-29 ENCOUNTER — OFFICE VISIT (OUTPATIENT)
Dept: PHYSICAL THERAPY | Facility: CLINIC | Age: 34
End: 2024-02-29
Payer: COMMERCIAL

## 2024-02-29 DIAGNOSIS — M54.50 BILATERAL LOW BACK PAIN, UNSPECIFIED CHRONICITY, UNSPECIFIED WHETHER SCIATICA PRESENT: ICD-10-CM

## 2024-02-29 DIAGNOSIS — Z98.890 STATUS POST LUMBAR DISCECTOMY: Primary | ICD-10-CM

## 2024-02-29 PROCEDURE — 97140 MANUAL THERAPY 1/> REGIONS: CPT

## 2024-02-29 PROCEDURE — 97112 NEUROMUSCULAR REEDUCATION: CPT

## 2024-02-29 NOTE — PROGRESS NOTES
Daily Note     Today's date: 2024  Patient name: Peter Nguyen  : 1990  MRN: [id number]  Referring provider: Farshad Contreras MD  Dx:   Encounter Diagnoses   Name Primary?    Status post lumbar discectomy Yes    Bilateral low back pain, unspecified chronicity, unspecified whether sciatica present                   Subjective: Pt reports increased pain and tightness in low back today. States he was getting into his truck with limited space and twisted awkwardly. Rated 4/10 with significant left foot pain.        Objective: See treatment diary below      Assessment: Pt tolerated treatment well with minimal complaints of pain. Assessed left foot and presents with TTP at intermetatarsal space and reproduction of symptoms across ball of foot. Recommended pt consult with podiatrist for further evaluation.       Plan: Continue with plan of care to decrease pain, improve mobility, strength, and function.          Precautions: SP Discectomy    Access Code: [email or web address]  URL: [email or web address]  Date: 2023  Prepared by: Jorge Cash    Exercises  - Supine Lower Trunk Rotation  - 1-2 x daily - 7 x weekly - 2 sets - 10 reps  - Supine Posterior Pelvic Tilt  - 1-2 x daily - 7 x weekly - 2 sets - 10 reps - 5 hold  - Supine 90/90 Sciatic Nerve Glide with Knee Flexion/Extension  - 1-2 x daily - 7 x weekly - 2 sets - 10 reps  - Seated Slump Nerve Glide  - 1-2 x daily - 7 x weekly - 1 sets - 10 reps  - Supine Gluteus Stretch  - 2-3 x daily - 7 x weekly - 3 sets - 30 hold  - Supine Bridge  - 1 x daily - 7 x weekly - 1-2 sets - 10 reps     Insurance:  AMA/CMS Eval/ Re-eval Auth #/ Referral # Total units  Start date  Expiration date Extension  Visit limitation?  PT only or  PT+OT? Co-Insurance   CMS 23 NA       10% co  OOP met                 POC Start Date POC Expiration Date Signed POC?   23 Pend      Date               Visits/Units:  Used               Authed:  Remaining     "                   Manuals 2/29 2/27 2/22 2/20 2/15    19 18 17 16 15   STM MFR B QL STM glut med MFR B QL STM glut med MFR B QL STM glut med MFR B QL STM glut med MFR B QL STM glut med   PA Grade 1-2 L/S Grade 1-2 L/S Grade 1-2 L/S Grade 1-2 L/S Grade 1-2 L/S   Long [place] traction L LE       MET Post left innominate        Neuro Re-Ed        Weighted carries    15 lb 3 laps ea side 10 lb 2 laps ea side   Shoulder ext with TA  Hold 5, 2x10 yellow  + march Hold 5, 2x10 yellow Hold 5, 2x10 yellow   TA+ add Hold 5, 2x10 alternating  Hold 5, 2x10 alternating Hold 5, 2x10 alternating Hold 5, 2x10 alternating Hold 5, 2x10 alternating   TA +abd        TA with marches   Hold 5, 2x10      Prone glute set        Bridge w/ abd stab 2x10 On Pball x15 On Pball x17 On Pball x8 2x10   Pallof press  [place] 2.0 x4 ea  [place] 1.5 x4 ea    Seated sciatic tensioner   Supine sciatic tensionr 3x10 B     squats        Ther Ex                        HEP        Education        LTR 2x10  2x10     Pelvic tilts   APT/PPT 15x5s     Stand gastroc str        Hook BOBBY str Piriformis stretch hold 10 x10 Piriformis stretch hold 10 x10 3x20\" Piriformis stretch hold 10 x10 Piriformis stretch hold 10 x5   Hook cross body glut str 10x10s LLE  10x10s LLE     Seated trunk rot                                                     "

## 2024-03-05 ENCOUNTER — EVALUATION (OUTPATIENT)
Dept: PHYSICAL THERAPY | Facility: CLINIC | Age: 34
End: 2024-03-05
Payer: COMMERCIAL

## 2024-03-05 DIAGNOSIS — Z98.890 STATUS POST LUMBAR DISCECTOMY: Primary | ICD-10-CM

## 2024-03-05 DIAGNOSIS — M54.50 BILATERAL LOW BACK PAIN, UNSPECIFIED CHRONICITY, UNSPECIFIED WHETHER SCIATICA PRESENT: ICD-10-CM

## 2024-03-05 PROCEDURE — 97112 NEUROMUSCULAR REEDUCATION: CPT

## 2024-03-05 PROCEDURE — 97140 MANUAL THERAPY 1/> REGIONS: CPT

## 2024-03-05 PROCEDURE — 97110 THERAPEUTIC EXERCISES: CPT

## 2024-03-05 NOTE — LETTER
2024    Farshad Contreras MD  [address] [place] [id number]    Patient: Peter Nguyen   YOB: 1990   Date of Visit: 3/5/2024     Encounter Diagnosis     ICD-10-CM    1. Status post lumbar discectomy  Z98.890       2. Bilateral low back pain, unspecified chronicity, unspecified whether sciatica present  M54.50           Dear Dr. Contreras:    Thank you for your recent referral of Peter Nguyen. Please review the attached evaluation summary from Peter's recent visit.     Please verify that you agree with the plan of care by signing the attached order.     If you have any questions or concerns, please do not hesitate to call.     I sincerely appreciate the opportunity to share in the care of one of your patients and hope to have another opportunity to work with you in the near future.       Sincerely,    Yessica Lu, PT      Referring Provider:      I certify that I have read the below Plan of Care and certify the need for these services furnished under this plan of treatment while under my care.                    Farshad Contreras MD  [address] [place], [place] [id number]  Via Fax: [phone]          PT Re-Evaluation     Today's date: 3/5/2024  Patient name: Peter Nguyen  : 1990  MRN: [id number]  Referring provider: Farshad Contreras MD  Dx:   Encounter Diagnosis     ICD-10-CM    1. Status post lumbar discectomy  Z98.890       2. Bilateral low back pain, unspecified chronicity, unspecified whether sciatica present  M54.50                      Assessment  Assessment details: Peter Nguyen is a 33 y.o. male who has been seen in physical therapy for 20 visits secondary to the diagnosis of Status post lumbar discectomy  (primary encounter diagnosis) and Bilateral low back pain, unspecified chronicity, unspecified whether sciatica present and is making steady gains towards established goals. The patient has  demonstrated decreased pain level, improved lower extremity strength, improved core stabilization, improved postural awareness, reduced gait deviations, improved spinal ROM, improved lower extremity ROM, improved flexibility, and improved balance. As a result, he has been able to increase functional activities such as performing work duties and lifting. He would benefit from continued PT services to address remaining impairments outlined in Progress Note and to maximize function and endurance. It was recommended to patient to get evaluated by podiatrist for ongoing significant left foot pain.      Impairments: abnormal gait, abnormal muscle tone, abnormal or restricted ROM, activity intolerance, impaired physical strength, lacks appropriate home exercise program, pain with function, weight-bearing intolerance, poor posture  and poor body mechanics    Symptom irritability: lowUnderstanding of Dx/Px/POC: good   Prognosis: good    Goals  Short term goals to be accomplished in 2-4weeks:  STG 1: Pt will demo independence with postural management -met  STG 2: Pt will demo I with HEP to maximize progress between therapy sessions-met  STG 3: Pt will demo L/S AROM < or = min loss throughout to promote improved functional mobility and body mechanics-met  STG 4: Pt will demo 1/2 MMT grade core stabilizers to improve lumbar stability with functional challenges- met  STG 5: Pt will reports pain dec freq and intensity 50%-met  STG 6: Pt will deny sleep disturbance -partially met    Long term goals to be accomplished in 4-8 weeks: ONGOING  LTG 1: Pt will demo good body mech with >75% functional challenges to prevent reinjury-met  LTG 2: Pt will be able to return to standing activity for >1 hour free as per PLOF- partially met  LTG 3: Pt will demo Good strength core stabilizers to promote carryover with body mech and posture-ongoing    Plan  Plan details: HEP development, stretching, strengthening, A/AA/PROM, joint mobilizations,  posture education, body mechanics training for bending and lifting, STM/MI as needed to reduce muscle tension, balance and proprioceptive training, muscle reeducation, PLOC discussed and agreed upon with patient.      Patient would benefit from: skilled physical therapy  Planned modality interventions: cryotherapy and thermotherapy: hydrocollator packs  Planned therapy interventions: manual therapy, neuromuscular re-education, self care, therapeutic activities, therapeutic exercise, home exercise program, body mechanics training, patient education, postural training, strengthening and stretching  Frequency: 2x week  Duration in weeks: 4  Treatment plan discussed with: patient        Subjective Evaluation    History of Present Illness  Mechanism of injury: surgery  Mechanism of injury: Pt reports he feels like he's at 85% of his overall function. States that his low back pain has improved over this past month. Left foot pain and tingling continues to be significant and limiting function. Reports continues difficulties sitting greater than 1 hour, being on feet for more than 4-5 hours. States that he is able to do most lifting but is very aware of lifting mechanics. Pain increases when lifting awkward equipment.     Patient Goals  Patient goals for therapy: decreased pain, independence with ADLs/IADLs and return to sport/leisure activities (ongoing)    Pain  Current pain ratin  At best pain ratin  At worst pain ratin  Location: low back and left foot  Quality: tight, throbbing and dull ache  Relieving factors: change in position  Aggravating factors: lifting, sitting and standing    Social Support  Steps to enter house: yes  Stairs in house: yes   Lives in: multiple-level home  Lives with: spouse    Employment status: working ([employer])  Hand dominance: right      Objective     Concurrent Complaints  Negative for disturbed sleep, bladder dysfunction, bowel dysfunction and saddle (S4)  numbness    Postural Observations  Seated posture: good  Standing posture: good      Palpation   Left   No palpable tenderness to the lumbar paraspinals and quadratus lumborum.     Right   No palpable tenderness to the lumbar paraspinals and quadratus lumborum.     Additional Palpation Details  Minimal TTP at left piriformis and glute origin    Tenderness     Lumbar Spine  No tenderness in the spinous process.     Active Range of Motion     Lumbar   Flexion: 90 (%) degrees   Extension: 75 (%) degrees   Left lateral flexion: 100 (%) degrees     Right lateral flexion: 100 (%) degrees   Left rotation: 100 (%) degrees   Right rotation: 100 (%) degrees     Joint Play     Hypomobile: L4 and L5     Strength/Myotome Testing     Left Hip   Planes of Motion   Flexion: 5  External rotation: 5  Internal rotation: 5    Right Hip   Planes of Motion   Flexion: 5  External rotation: 4+  Internal rotation: 4+    Left Knee   Flexion: 5  Extension: 5    Right Knee   Flexion: 5  Extension: 5    Left Ankle/Foot   Dorsiflexion: 5  Plantar flexion: 5    Right Ankle/Foot   Dorsiflexion: 5  Plantar flexion: 5    Muscle Activation   Patient unable to activate left transverse abdominals and right transverse abdominals.     Tests     Lumbar     Left   Negative passive SLR and slump test.     Right   Negative passive SLR and slump test.     Left Pelvic Girdle/Sacrum   Negative: active SLR test.     Right Pelvic Girdle/Sacrum   Negative: active SLR test.     Left Hip   Negative BOBBY, FADIR and long sit.     Right Hip   Negative BOBBY, FADIR and long sit.     Ambulation     Observational Gait     Additional Observational Gait Details  Patient ambulating with guarded pattern, reduced trunk rotation and decreased arm swing.    Functional Assessment      Squat    Left within functional limits and right within functional limits.     Single Leg Stance   Left: 30 seconds  Right: 30 seconds    General Comments:      Hip Comments   WNL                "  Precautions: SP Discectomy    Access Code: [email or web address]     Insurance:  A/CMS Eval/ Re-eval Auth #/ Referral # Total units  Start date  Expiration date Extension  Visit limitation?  PT only or  PT+OT? Co-Insurance   CMS 12/18/23 NA       10% co  OOP met                 POC Start Date POC Expiration Date Signed POC?   12/18/23 2/12/24 Pend      Date               Visits/Units:  Used               Authed:  Remaining                       Manuals 2/29 2/27 2/22  3/5    19 18 17  20   STM MFR B QL STM glut med MFR B QL STM glut med MFR B QL STM glut med MFR B QL STM glut med MFR B QL STM glut med   PA Grade 1-2 L/S Grade 1-2 L/S Grade 1-2 L/S Grade 1-2 L/S Grade 3 L/S and TS   Long Axis traction L LE       MET Post left innominate        Neuro Re-Ed        Weighted carries    15 lb 3 laps ea side    Shoulder ext with TA  Hold 5, 2x10 yellow  + march Hold 5, 2x10 yellow + march Hold 5, 2x10 yellow   TA+ add Hold 5, 2x10 alternating  Hold 5, 2x10 alternating Hold 5, 2x10 alternating Hold 5, 2x10 alternating    SLS     tested   TA with marches   Hold 5, 2x10      Prone glute set        Bridge w/ abd stab 2x10 On Pball x15 On Pball x17 On Pball x8    Pallof press  [place] 2.0 x4 ea  Dipti 1.5 x4 ea    Seated sciatic tensioner   Supine sciatic tensionr 3x10 B     squats     TRX 3x10   Ther Ex        TS rotation     Bilateral Hold 5, 2x10            HEP        Education        LTR 2x10  2x10     Pelvic tilts   APT/PPT 15x5s     Stand gastroc str        Hook BOBBY str Piriformis stretch hold 10 x10 Piriformis stretch hold 10 x10 3x20\" Piriformis stretch hold 10 x10    Hook cross body glut str 10x10s LLE  10x10s LLE  10x10s LLE   Seated trunk rot                                                  "

## 2024-03-05 NOTE — PROGRESS NOTES
PT Re-Evaluation     Today's date: 3/5/2024  Patient name: Peter Nguyen  : 1990  MRN: [id number]  Referring provider: Farshad Contreras MD  Dx:   Encounter Diagnosis     ICD-10-CM    1. Status post lumbar discectomy  Z98.890       2. Bilateral low back pain, unspecified chronicity, unspecified whether sciatica present  M54.50                      Assessment  Assessment details: Peter Nguyen is a 33 y.o. male who has been seen in physical therapy for 20 visits secondary to the diagnosis of Status post lumbar discectomy  (primary encounter diagnosis) and Bilateral low back pain, unspecified chronicity, unspecified whether sciatica present and is making steady gains towards established goals. The patient has demonstrated decreased pain level, improved lower extremity strength, improved core stabilization, improved postural awareness, reduced gait deviations, improved spinal ROM, improved lower extremity ROM, improved flexibility, and improved balance. As a result, he has been able to increase functional activities such as performing work duties and lifting. He would benefit from continued PT services to address remaining impairments outlined in Progress Note and to maximize function and endurance. It was recommended to patient to get evaluated by podiatrist for ongoing significant left foot pain.      Impairments: abnormal gait, abnormal muscle tone, abnormal or restricted ROM, activity intolerance, impaired physical strength, lacks appropriate home exercise program, pain with function, weight-bearing intolerance, poor posture  and poor body mechanics    Symptom irritability: lowUnderstanding of Dx/Px/POC: good   Prognosis: good    Goals  Short term goals to be accomplished in 2-4weeks:  STG 1: Pt will demo independence with postural management -met  STG 2: Pt will demo I with HEP to maximize progress between therapy sessions-met  STG 3: Pt will demo L/S AROM < or = min loss throughout to  promote improved functional mobility and body mechanics-met  STG 4: Pt will demo 1/2 MMT grade core stabilizers to improve lumbar stability with functional challenges- met  STG 5: Pt will reports pain dec freq and intensity 50%-met  STG 6: Pt will deny sleep disturbance -partially met    Long term goals to be accomplished in 4-8 weeks: ONGOING  LTG 1: Pt will demo good body mech with >75% functional challenges to prevent reinjury-met  LTG 2: Pt will be able to return to standing activity for >1 hour free as per PLOF- partially met  LTG 3: Pt will demo Good strength core stabilizers to promote carryover with body mech and posture-ongoing    Plan  Plan details: HEP development, stretching, strengthening, A/AA/PROM, joint mobilizations, posture education, body mechanics training for bending and lifting, STM/MI as needed to reduce muscle tension, balance and proprioceptive training, muscle reeducation, PLOC discussed and agreed upon with patient.      Patient would benefit from: skilled physical therapy  Planned modality interventions: cryotherapy and thermotherapy: hydrocollator packs  Planned therapy interventions: manual therapy, neuromuscular re-education, self care, therapeutic activities, therapeutic exercise, home exercise program, body mechanics training, patient education, postural training, strengthening and stretching  Frequency: 2x week  Duration in weeks: 4  Treatment plan discussed with: patient        Subjective Evaluation    History of Present Illness  Mechanism of injury: surgery  Mechanism of injury: Pt reports he feels like he's at 85% of his overall function. States that his low back pain has improved over this past month. Left foot pain and tingling continues to be significant and limiting function. Reports continues difficulties sitting greater than 1 hour, being on feet for more than 4-5 hours. States that he is able to do most lifting but is very aware of lifting mechanics. Pain increases when  lifting awkward equipment.     Patient Goals  Patient goals for therapy: decreased pain, independence with ADLs/IADLs and return to sport/leisure activities (ongoing)    Pain  Current pain ratin  At best pain ratin  At worst pain ratin  Location: low back and left foot  Quality: tight, throbbing and dull ache  Relieving factors: change in position  Aggravating factors: lifting, sitting and standing    Social Support  Steps to enter house: yes  Stairs in house: yes   Lives in: multiple-level home  Lives with: spouse    Employment status: working ([employer])  Hand dominance: right      Objective     Concurrent Complaints  Negative for disturbed sleep, bladder dysfunction, bowel dysfunction and saddle (S4) numbness    Postural Observations  Seated posture: good  Standing posture: good      Palpation   Left   No palpable tenderness to the lumbar paraspinals and quadratus lumborum.     Right   No palpable tenderness to the lumbar paraspinals and quadratus lumborum.     Additional Palpation Details  Minimal TTP at left piriformis and glute origin    Tenderness     Lumbar Spine  No tenderness in the spinous process.     Active Range of Motion     Lumbar   Flexion: 90 (%) degrees   Extension: 75 (%) degrees   Left lateral flexion: 100 (%) degrees     Right lateral flexion: 100 (%) degrees   Left rotation: 100 (%) degrees   Right rotation: 100 (%) degrees     Joint Play     Hypomobile: L4 and L5     Strength/Myotome Testing     Left Hip   Planes of Motion   Flexion: 5  External rotation: 5  Internal rotation: 5    Right Hip   Planes of Motion   Flexion: 5  External rotation: 4+  Internal rotation: 4+    Left Knee   Flexion: 5  Extension: 5    Right Knee   Flexion: 5  Extension: 5    Left Ankle/Foot   Dorsiflexion: 5  Plantar flexion: 5    Right Ankle/Foot   Dorsiflexion: 5  Plantar flexion: 5    Muscle Activation   Patient unable to activate left transverse abdominals and right transverse abdominals.      Tests     Lumbar     Left   Negative passive SLR and slump test.     Right   Negative passive SLR and slump test.     Left Pelvic Girdle/Sacrum   Negative: active SLR test.     Right Pelvic Girdle/Sacrum   Negative: active SLR test.     Left Hip   Negative BOBBY, FADIR and long sit.     Right Hip   Negative BOBBY, FADIR and long sit.     Ambulation     Observational Gait     Additional Observational Gait Details  Patient ambulating with guarded pattern, reduced trunk rotation and decreased arm swing.    Functional Assessment      Squat    Left within functional limits and right within functional limits.     Single Leg Stance   Left: 30 seconds  Right: 30 seconds    General Comments:      Hip Comments   WNL                 Precautions: SP Discectomy    Access Code: [email or web address]     Insurance:  [place]/CMS Eval/ Re-eval Auth #/ Referral # Total units  Start date  Expiration date Extension  Visit limitation?  PT only or  PT+OT? Co-Insurance   CMS 12/18/23 NA       10% co  OOP met                 POC Start Date POC Expiration Date Signed POC?   12/18/23 2/12/24 Pend      Date               Visits/Units:  Used               Authed:  Remaining                       Manuals 2/29 2/27 2/22  3/5    19 18 17  20   STM MFR B QL STM glut med MFR B QL STM glut med MFR B QL STM glut med MFR B QL STM glut med MFR B QL STM glut med   PA Grade 1-2 L/S Grade 1-2 L/S Grade 1-2 L/S Grade 1-2 L/S Grade 3 L/S and TS   Long Axis traction L LE       MET Post left innominate        Neuro Re-Ed        Weighted carries    15 lb 3 laps ea side    Shoulder ext with TA  Hold 5, 2x10 yellow  + march Hold 5, 2x10 yellow + march Hold 5, 2x10 yellow   TA+ add Hold 5, 2x10 alternating  Hold 5, 2x10 alternating Hold 5, 2x10 alternating Hold 5, 2x10 alternating    SLS     tested   TA with marches   Hold 5, 2x10      Prone glute set        Bridge w/ abd stab 2x10 On Pball x15 On Pball x17 On Pball x8    Pallof press  [place] 2.0 x4 ea  Dipti 1.5 x4 ea   "  Seated sciatic tensioner   Supine sciatic tensionr 3x10 B     squats     TRX 3x10   Ther Ex        TS rotation     Bilateral Hold 5, 2x10            HEP        Education        LTR 2x10  2x10     Pelvic tilts   APT/PPT 15x5s     Stand gastroc str        Hook BOBBY str Piriformis stretch hold 10 x10 Piriformis stretch hold 10 x10 3x20\" Piriformis stretch hold 10 x10    Hook cross body glut str 10x10s LLE  10x10s LLE  10x10s LLE   Seated trunk rot                                  "

## 2024-03-07 ENCOUNTER — APPOINTMENT (OUTPATIENT)
Dept: PHYSICAL THERAPY | Facility: CLINIC | Age: 34
End: 2024-03-07
Payer: COMMERCIAL

## 2024-03-12 ENCOUNTER — OFFICE VISIT (OUTPATIENT)
Dept: PHYSICAL THERAPY | Facility: CLINIC | Age: 34
End: 2024-03-12
Payer: COMMERCIAL

## 2024-03-12 DIAGNOSIS — Z98.890 STATUS POST LUMBAR DISCECTOMY: Primary | ICD-10-CM

## 2024-03-12 DIAGNOSIS — M54.50 BILATERAL LOW BACK PAIN, UNSPECIFIED CHRONICITY, UNSPECIFIED WHETHER SCIATICA PRESENT: ICD-10-CM

## 2024-03-12 PROCEDURE — 97140 MANUAL THERAPY 1/> REGIONS: CPT

## 2024-03-12 PROCEDURE — 97112 NEUROMUSCULAR REEDUCATION: CPT

## 2024-03-12 PROCEDURE — 97110 THERAPEUTIC EXERCISES: CPT

## 2024-03-12 NOTE — PROGRESS NOTES
Daily Note     Today's date: 3/12/2024  Patient name: Peter Nguyen  : 1990  MRN: [id number]  Referring provider: Farshad Contreras MD  Dx:   Encounter Diagnoses   Name Primary?    Status post lumbar discectomy Yes    Bilateral low back pain, unspecified chronicity, unspecified whether sciatica present                   Subjective: Pt reports 8/10 left foot pain today. No c/o issues in the back.       Objective: See treatment diary below      Assessment: Progressed pt's program per treatment diary and demonstrates good tolerance. Able to tolerate a progression of strength and stability exercises with no c/o pain.        Plan: Continue with plan of care to decrease pain, improve mobility, strength, and function.          Precautions: SP Discectomy    Access Code: [email or web address]     Insurance:  [place]/CMS Eval/ Re-eval Auth #/ Referral # Total units  Start date  Expiration date Extension  Visit limitation?  PT only or  PT+OT? Co-Insurance   CMS 23 NA       10% co  OOP met                 POC Start Date POC Expiration Date Signed POC?   23 Pend      Date               Visits/Units:  Used               Authed:  Remaining                       Manuals 2/29 2/27  3/12 3/5    19 18  21 20   STM MFR B QL STM glut med MFR B QL STM glut med MFR B QL STM glut med MFR B QL STM glut med MFR B QL STM glut med   PA Grade 1-2 L/S Grade 1-2 L/S Grade 1-2 L/S Grade 1-2 L/S Grade 3 L/S and TS   Long Axis traction L LE       MET Post left innominate        Neuro Re-Ed        Weighted carries        Shoulder ext with TA  Hold 5, 2x10 yellow   + march Hold 5, 2x10 yellow   TA+ add Hold 5, 2x10 alternating  Hold 5, 2x10 alternating Hold 5, 2x10 alternating Hold 5, 2x10 alternating    SLS    [place] x20 ea tested   TA with marches   Hold 5, 2x10  Dead bug x20    Prone glute set        Bridge w/ abd stab 2x10 On Pball x15 On Pball x17     Pallof press  [place] 2.0 x4 ea      Seated sciatic tensioner   Supine  "sciatic tensionr 3x10 B     lunges    Post x10 ea    Half kneel row    5.0 2x10 ea    squats     TRX 3x10   Ther Ex        TS rotation     Bilateral Hold 5, 2x10            HEP        Education        LTR 2x10  2x10     Pelvic tilts   APT/PPT 15x5s     Stand gastroc str        Hook BOBBY str Piriformis stretch hold 10 x10 Piriformis stretch hold 10 x10 3x20\" Piriformis stretch hold 10 x10    Hook cross body glut str 10x10s LLE  10x10s LLE  10x10s LLE   Seated trunk rot                                     "

## 2024-03-14 ENCOUNTER — APPOINTMENT (OUTPATIENT)
Dept: PHYSICAL THERAPY | Facility: CLINIC | Age: 34
End: 2024-03-14
Payer: COMMERCIAL

## 2024-03-18 ENCOUNTER — OFFICE VISIT (OUTPATIENT)
Age: 34
End: 2024-03-18
Payer: COMMERCIAL

## 2024-03-18 VITALS — RESPIRATION RATE: 17 BRPM | HEIGHT: 71 IN | BODY MASS INDEX: 35 KG/M2 | WEIGHT: 250 LBS

## 2024-03-18 DIAGNOSIS — M21.41 ACQUIRED FLAT FOOT, RIGHT: ICD-10-CM

## 2024-03-18 DIAGNOSIS — M54.16 RADICULOPATHY OF LUMBAR REGION: ICD-10-CM

## 2024-03-18 DIAGNOSIS — M21.961 ACQUIRED DEFORMITY OF BOTH FEET: Primary | ICD-10-CM

## 2024-03-18 DIAGNOSIS — M77.42 METATARSALGIA OF BOTH FEET: ICD-10-CM

## 2024-03-18 DIAGNOSIS — M21.42 ACQUIRED FLAT FOOT, LEFT: ICD-10-CM

## 2024-03-18 DIAGNOSIS — M77.41 METATARSALGIA OF BOTH FEET: ICD-10-CM

## 2024-03-18 DIAGNOSIS — B35.9 DERMATOPHYTOSIS: ICD-10-CM

## 2024-03-18 DIAGNOSIS — M21.962 ACQUIRED DEFORMITY OF BOTH FEET: Primary | ICD-10-CM

## 2024-03-18 PROCEDURE — 99203 OFFICE O/P NEW LOW 30 MIN: CPT | Performed by: PODIATRIST

## 2024-03-18 RX ORDER — GABAPENTIN 100 MG/1
100 CAPSULE ORAL
Qty: 30 CAPSULE | Refills: 1 | Status: SHIPPED | OUTPATIENT
Start: 2024-03-18 | End: 2024-05-17

## 2024-03-18 RX ORDER — KETOCONAZOLE 20 MG/G
CREAM TOPICAL DAILY
Qty: 60 G | Refills: 1 | Status: SHIPPED | OUTPATIENT
Start: 2024-03-18 | End: 2024-04-17

## 2024-03-18 NOTE — PROGRESS NOTES
Assessment/Plan: Metatarsalgia secondary to acquired deformity of foot bilateral.  Radiculopathy.  Acquired pes planus.  Pain upon ambulation.  Dermatophytosis.    Plan.  Chart reviewed.  PCP notes reviewed.  Prior radiographs reviewed.  Patient seen at chair side.  Patient evaluated.  Has been advised of his condition.  At this time we will try empiric course of gabapentin.  Patient will benefit from orthotic foot control.  These have been ordered.  In addition orthotics will control deformity and ease pain.  Patient be started on topical antifungal.  Aftercare instruction given.  Return for follow-up.         Diagnoses and all orders for this visit:    Acquired deformity of both feet  -     Device Prior Authorization; Future    Acquired flat foot, left  -     Device Prior Authorization; Future    Acquired flat foot, right  -     Device Prior Authorization; Future    Metatarsalgia of both feet    Radiculopathy of lumbar region  -     gabapentin (NEURONTIN) 100 mg capsule; Take 1 capsule (100 mg total) by mouth daily at bedtime    Dermatophytosis  -     ketoconazole (NIZORAL) 2 % cream; Apply topically daily          Subjective: Patient has pain and numbness of his feet.  Patient has history of low back pain that required surgery.  He still gets pain and numbness status post surgery.  He is also concerned about dry scaly skin of his feet.    Allergies   Allergen Reactions    Molds & Smuts Itching         Current Outpatient Medications:     gabapentin (NEURONTIN) 100 mg capsule, Take 1 capsule (100 mg total) by mouth daily at bedtime, Disp: 30 capsule, Rfl: 1    ketoconazole (NIZORAL) 2 % cream, Apply topically daily, Disp: 60 g, Rfl: 1    albuterol (PROVENTIL HFA,VENTOLIN HFA) 90 mcg/act inhaler, Inhale 2 puffs every 4 (four) hours as needed for wheezing, Disp: 8.5 g, Rfl: 1    ALPRAZolam (XANAX) 0.25 mg tablet, Take 1 tablet (0.25 mg total) by mouth 2 (two) times a day as needed for anxiety, Disp: 60 tablet, Rfl:  0    cyclobenzaprine (FLEXERIL) 5 mg tablet, Take 5 mg by mouth, Disp: , Rfl:     fluticasone (FLONASE) 50 mcg/act nasal spray, 1 spray into each nostril daily, Disp: , Rfl:     lidocaine (LIDODERM) 5 %, Apply 1 patch topically daily, Disp: , Rfl:     Zubsolv 2.9-0.71 MG SUBL, in the morning, Disp: , Rfl:     Patient Active Problem List   Diagnosis    Anxiety    Mild intermittent asthma without complication    Panic disorder without agoraphobia    Chronic midline low back pain with left-sided sciatica    Intervertebral disc disorders with radiculopathy, lumbar region          Patient ID: Peter gNuyen is a 33 y.o. male.    HPI    The following portions of the patient's history were reviewed and updated as appropriate:     family history includes No Known Problems in his brother and father; Other in his mother; Substance Abuse in his family.      reports that he has quit smoking. He has quit using smokeless tobacco. He reports that he does not drink alcohol and does not use drugs.    Vitals:    03/18/24 1136   Resp: 17       Review of Systems      Objective:  Patient's shoes and socks removed.   Foot Exam    General  General Appearance: appears stated age and healthy   Orientation: alert and oriented to person, place, and time   Affect: appropriate   Gait: antalgic       Right Foot/Ankle     Inspection and Palpation  Swelling: none   Arch: pes planus  Hallux limitus: yes  Skin Exam: dry skin and tinea;     Neurovascular  Dorsalis pedis: 3+  Posterior tibial: 3+  Superficial peroneal nerve sensation: diminished  Deep peroneal nerve sensation: diminished  Sural nerve sensation: diminished      Left Foot/Ankle      Inspection and Palpation  Swelling: none   Arch: pes planus  Hallux limitus: yes  Skin Exam: dry skin and tinea;     Neurovascular  Dorsalis pedis: 3+  Posterior tibial: 3+  Superficial peroneal nerve sensation: diminished  Deep peroneal nerve sensation: diminished  Sural nerve sensation:  diminished      Physical Exam  Vitals and nursing note reviewed.   Constitutional:       Appearance: Normal appearance.   Cardiovascular:      Rate and Rhythm: Normal rate and regular rhythm.      Pulses:           Dorsalis pedis pulses are 3+ on the right side and 3+ on the left side.        Posterior tibial pulses are 3+ on the right side and 3+ on the left side.   Feet:      Right foot:      Skin integrity: Dry skin present.      Left foot:      Skin integrity: Dry skin present.   Skin:     General: Skin is warm and dry.      Comments: Patient has moccasin tinea pedis/dermatophytosis bilateral.   Neurological:      Mental Status: He is alert.   Psychiatric:         Mood and Affect: Mood normal.         Behavior: Behavior normal.         Thought Content: Thought content normal.         Judgment: Judgment normal.

## 2024-03-19 ENCOUNTER — OFFICE VISIT (OUTPATIENT)
Dept: PHYSICAL THERAPY | Facility: CLINIC | Age: 34
End: 2024-03-19
Payer: COMMERCIAL

## 2024-03-19 ENCOUNTER — OFFICE VISIT (OUTPATIENT)
Dept: FAMILY MEDICINE CLINIC | Facility: CLINIC | Age: 34
End: 2024-03-19
Payer: COMMERCIAL

## 2024-03-19 ENCOUNTER — TELEPHONE (OUTPATIENT)
Age: 34
End: 2024-03-19

## 2024-03-19 VITALS
TEMPERATURE: 97.7 F | DIASTOLIC BLOOD PRESSURE: 90 MMHG | SYSTOLIC BLOOD PRESSURE: 118 MMHG | HEIGHT: 71 IN | OXYGEN SATURATION: 98 % | WEIGHT: 263.6 LBS | RESPIRATION RATE: 18 BRPM | HEART RATE: 72 BPM | BODY MASS INDEX: 36.9 KG/M2

## 2024-03-19 DIAGNOSIS — M25.512 ACUTE PAIN OF LEFT SHOULDER: Primary | ICD-10-CM

## 2024-03-19 PROCEDURE — 97161 PT EVAL LOW COMPLEX 20 MIN: CPT

## 2024-03-19 PROCEDURE — 96372 THER/PROPH/DIAG INJ SC/IM: CPT

## 2024-03-19 PROCEDURE — 99213 OFFICE O/P EST LOW 20 MIN: CPT | Performed by: STUDENT IN AN ORGANIZED HEALTH CARE EDUCATION/TRAINING PROGRAM

## 2024-03-19 RX ORDER — DEXAMETHASONE SODIUM PHOSPHATE 4 MG/ML
4 INJECTION, SOLUTION INTRA-ARTICULAR; INTRALESIONAL; INTRAMUSCULAR; INTRAVENOUS; SOFT TISSUE ONCE
Status: COMPLETED | OUTPATIENT
Start: 2024-03-19 | End: 2024-03-19

## 2024-03-19 RX ORDER — KETOROLAC TROMETHAMINE 30 MG/ML
30 INJECTION, SOLUTION INTRAMUSCULAR; INTRAVENOUS ONCE
Status: COMPLETED | OUTPATIENT
Start: 2024-03-19 | End: 2024-03-19

## 2024-03-19 RX ORDER — CYCLOBENZAPRINE HCL 10 MG
10 TABLET ORAL
Qty: 30 TABLET | Refills: 1 | Status: SHIPPED | OUTPATIENT
Start: 2024-03-19

## 2024-03-19 RX ADMIN — KETOROLAC TROMETHAMINE 30 MG: 30 INJECTION, SOLUTION INTRAMUSCULAR; INTRAVENOUS at 14:24

## 2024-03-19 RX ADMIN — DEXAMETHASONE SODIUM PHOSPHATE 4 MG: 4 INJECTION, SOLUTION INTRA-ARTICULAR; INTRALESIONAL; INTRAMUSCULAR; INTRAVENOUS; SOFT TISSUE at 14:23

## 2024-03-19 NOTE — PROGRESS NOTES
Name: Peter Nguyen      : 1990      MRN: [id number]  Encounter Provider: Gris Huntley MD  Encounter Date: 3/19/2024   Encounter department: [facility]    Assessment & Plan     1. Acute pain of left shoulder  -     cyclobenzaprine (FLEXERIL) 10 mg tablet; Take 1 tablet (10 mg total) by mouth daily at bedtime  -     XR shoulder 2+ vw left; Future; Expected date: 2024  -     ketorolac (TORADOL) injection 30 mg  -     dexamethasone (DECADRON) injection 4 mg     -Recommend limit heavy lifting   -Flexeril and Advil as needed for pain  -Apply heat to affected area  -Imaging pending  -Encourage physical therapy, will follow up after imaging results are received    Subjective      HPI    Patient reports left shoulder pain that started three weeks ago. 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 He notes about three weeks ago he was in the shower and felt a sharp pain in his shoulder. He notes his shoulder is sore at night and feels better with aleve. Pain improves throughout the day. He had back surgery and has some residual numbness in his left foot.     Review of Systems   Constitutional:  Negative for chills and fever.   HENT:  Negative for ear pain and sore throat.    Eyes:  Negative for pain and visual disturbance.   Respiratory:  Negative for cough and shortness of breath.    Cardiovascular:  Negative for chest pain and palpitations.   Gastrointestinal:  Negative for abdominal pain and vomiting.   Genitourinary:  Negative for dysuria and hematuria.   Musculoskeletal:  Positive for myalgias. Negative for arthralgias and back pain.   Skin:  Negative for color change and rash.   Neurological:  Positive for numbness. Negative for seizures and syncope.   All other systems reviewed and are negative.      Current Outpatient Medications on File Prior to Visit   Medication Sig   • albuterol (PROVENTIL HFA,VENTOLIN HFA) 90 mcg/act inhaler Inhale 2 puffs every 4 (four)  "hours as needed for wheezing   • ALPRAZolam (XANAX) 0.25 mg tablet Take 1 tablet (0.25 mg total) by mouth 2 (two) times a day as needed for anxiety   • fluticasone (FLONASE) 50 mcg/act nasal spray 1 spray into each nostril daily   • gabapentin (NEURONTIN) 100 mg capsule Take 1 capsule (100 mg total) by mouth daily at bedtime   • ketoconazole (NIZORAL) 2 % cream Apply topically daily   • Zubsolv 2.9-0.71 MG SUBL in the morning   • [DISCONTINUED] cyclobenzaprine (FLEXERIL) 5 mg tablet Take 5 mg by mouth   • [DISCONTINUED] lidocaine (LIDODERM) 5 % Apply 1 patch topically daily       Objective     /90   Pulse 72   Temp 97.7 °F (36.5 °C) (Temporal)   Resp 18   Ht 5' 11\" (1.803 m)   Wt 120 kg (263 lb 9.6 oz)   SpO2 98%   BMI 36.76 kg/m²     Physical Exam  Constitutional:       Appearance: Normal appearance.   HENT:      Head: Normocephalic and atraumatic.   Cardiovascular:      Rate and Rhythm: Normal rate and regular rhythm.      Pulses: Normal pulses.      Heart sounds: Normal heart sounds.   Pulmonary:      Effort: Pulmonary effort is normal.      Breath sounds: Normal breath sounds.   Musculoskeletal:      Right shoulder: Normal.      Left shoulder: Tenderness present. No swelling, deformity or crepitus. Normal range of motion. Normal strength.   Neurological:      General: No focal deficit present.      Mental Status: He is alert and oriented to person, place, and time.   Psychiatric:         Mood and Affect: Mood normal.         Behavior: Behavior normal.         Thought Content: Thought content normal.         Judgment: Judgment normal.       Gris Huntley MD    "

## 2024-03-19 NOTE — PROGRESS NOTES
PT Evaluation     Today's date: 3/19/2024  Patient name: Peter Nguyen  : 1990  MRN: [id number]  Referring provider: Self  Dx:   Encounter Diagnosis     ICD-10-CM    1. Acute pain of left shoulder  M25.512                      Assessment  Assessment details: Peter Nguyen is a 33 y.o. male who presents to physical therapy via direct  access for evaluation of left shoulder pain.  Pt presents with decreased UE range of motion, decreased UE strength, impaired function, poor posture, and poor body mechanics. The pt presents with functional limitations of ADLs, self-care, and reaching. Pt would benefit from physical therapy services to address these limitations and maximize function. Pt was instructed and educated on home exercise program and good sitting posture today and demonstrates understanding.   Impairments: abnormal muscle tone, abnormal or restricted ROM, abnormal movement, activity intolerance, impaired physical strength, lacks appropriate home exercise program, pain with function and scapular dyskinesis    Symptom irritability: moderateUnderstanding of Dx/Px/POC: good   Prognosis: good    Goals  Short Term Goals (2 weeks)  Pt will be independent in basic Home Exercise Program  Pt will demonstrate improved postural awareness with no cueing required from PT  Pt will demonstrate improved left shoulder to WNL    Long Term Goals (4 weeks)  Pt will be independent in comprehensive Home Exercise Program  Pt will be able to perform all ADLs with pain no more than 2/10  Pt will demonstrate improved FOTO status score by 10 points       Plan  Patient would benefit from: skilled PT  Referral necessary: No  Planned modality interventions: cryotherapy and thermotherapy: hydrocollator packs  Planned therapy interventions: ADL retraining, body mechanics training, functional ROM exercises, home exercise program, graded exercise, joint mobilization, manual therapy, massage, neuromuscular re-education,  patient education, postural training, strengthening, stretching, therapeutic activities, therapeutic exercise and therapeutic training  Frequency: 2x week  Duration in weeks: 6  Treatment plan discussed with: patient        Subjective Evaluation    History of Present Illness  Mechanism of injury: Pt reports he was reaching behind his back about 3 weeks ago and felt a sharp pain in left shoulder. Has been experiencing shoulder pain since. Pt reports difficulties and pain reaching behind his back, donning safety vest, and reaching in awkward directions. Pain is on and off during the morning and soreness throughout the day. Reports significant pain throughout night and starts to feel it as soon as he lays down. Does not feel limited with lifting. Pt had a consult with PCP today and was given Toradol injection for pain and script for Xray.           Recurrent probem    Patient Goals  Patient goals for therapy: decreased pain and independence with ADLs/IADLs  Patient goal: reach pain free  Pain  Current pain rating: 3  At best pain ratin  At worst pain rating: 10  Location: left anterior shoulder  Quality: dull ache, sharp and tight  Relieving factors: medications  Exacerbated by: reaching behind.  Progression: worsening    Social Support    Employment status: working  Hand dominance: right      Diagnostic Tests  No diagnostic tests performed  Treatments  Previous treatment: injection treatment      Objective     Postural Observations  Seated posture: poor  Standing posture: fair  Correction of posture: makes symptoms better    Additional Postural Observation Details  Forward head, rounded shoulders    Palpation   Left   Hypertonic in the levator scapulae, pectoralis minor and upper trapezius.   Tenderness of the pectoralis minor.   Trigger point to pectoralis minor.     Right   Hypertonic in the pectoralis minor.     Cervical/Thoracic Screen   Cervical range of motion within normal limits  Thoracic range of motion  within normal limits with the following exceptions: Into extension and rotation    Neurological Testing     Sensation     Shoulder   Left Shoulder   Intact: light touch    Right Shoulder   Intact: Light touch    Active Range of Motion   Left Shoulder   Flexion: 155 degrees with pain  Extension: with pain  Abduction: 175 degrees   External rotation 45°: 55 degrees with pain  Internal rotation 45°: 60 degrees with pain    Right Shoulder   Normal active range of motion  Flexion: 180 degrees   Abduction: 180 degrees   External rotation 45°: 80 degrees   Internal rotation 45°: 65 degrees     Scapular Mobility   Left Shoulder   Scapular mobility: fair  Scapular Mobility with Shoulder to 90° FF   Upward rotation: inadequate    Joint Play   Left Shoulder  Joints within functional limits are the 1st rib. Hypomobile in the thoracic spine.    Right Shoulder  Joints within functional limits are the 1st rib.     Strength/Myotome Testing     Left Shoulder     Planes of Motion   Flexion: 4+   Extension: 4   Abduction: 4+   External rotation at 0°: 5   Internal rotation at 0°: 5     Right Shoulder     Planes of Motion   Flexion: 5   Extension: 5   Abduction: 5   External rotation at 0°: 5   Internal rotation at 0°: 5     Additional Strength Details  Pain with resisted left abduction    Tests     Left Shoulder   Positive Hawkin's.   Negative drop arm, empty can and painful arc.       Flowsheet Rows      Flowsheet Row Most Recent Value   PT/OT G-Codes    Current Score 71   Projected Score 80   FOTO information reviewed Yes            Pt was given initial Home Exercise Program today and demonstrates understanding. Pec stretch at door way 10 sec x10        Precautions standard       Manuals 3/19       STM Pec minor release                               Neuro Re-Ed         Scap retraction  x10                                                       Ther Ex        Pec stretch Door Hold 10, 1x10                                                                 Ther Activity                        Gait Training                        Modalities                              Insurance:  AMA/CMS Eval/ Re-eval Auth #/ Referral # Total units or visits Start date  Expiration date KX? Visit limitation?  PT only or  PT+OT? Co-Insurance   CMS   2    30 PCY                   POC Start Date POC Expiration Date Signed POC?   3/19/24 4/19/24        Date 3/19              Visits/Units:  Used 28              Authed:  Remaining  2

## 2024-03-19 NOTE — TELEPHONE ENCOUNTER
Caller: Peter Nguyen    Doctor: Patt    Reason for call: Is returning a call from the office.  The call was regarding his orthotics.  Can someone please reach back out to him?   Thank  you.    Call back#: [phone]

## 2024-03-21 ENCOUNTER — APPOINTMENT (OUTPATIENT)
Dept: PHYSICAL THERAPY | Facility: CLINIC | Age: 34
End: 2024-03-21
Payer: COMMERCIAL

## 2024-03-22 ENCOUNTER — OFFICE VISIT (OUTPATIENT)
Dept: URGENT CARE | Facility: CLINIC | Age: 34
End: 2024-03-22
Payer: COMMERCIAL

## 2024-03-22 ENCOUNTER — APPOINTMENT (OUTPATIENT)
Dept: RADIOLOGY | Facility: CLINIC | Age: 34
End: 2024-03-22
Payer: COMMERCIAL

## 2024-03-22 VITALS
OXYGEN SATURATION: 97 % | SYSTOLIC BLOOD PRESSURE: 130 MMHG | TEMPERATURE: 97.5 F | DIASTOLIC BLOOD PRESSURE: 80 MMHG | HEIGHT: 71 IN | BODY MASS INDEX: 37.1 KG/M2 | WEIGHT: 265 LBS | HEART RATE: 80 BPM | RESPIRATION RATE: 18 BRPM

## 2024-03-22 DIAGNOSIS — M25.512 ACUTE PAIN OF LEFT SHOULDER: ICD-10-CM

## 2024-03-22 DIAGNOSIS — R51.9 ACUTE NONINTRACTABLE HEADACHE, UNSPECIFIED HEADACHE TYPE: Primary | ICD-10-CM

## 2024-03-22 DIAGNOSIS — R41.89 BRAIN FOG: ICD-10-CM

## 2024-03-22 PROCEDURE — 99213 OFFICE O/P EST LOW 20 MIN: CPT | Performed by: PHYSICIAN ASSISTANT

## 2024-03-22 PROCEDURE — 73030 X-RAY EXAM OF SHOULDER: CPT

## 2024-03-22 NOTE — PATIENT INSTRUCTIONS
Headache: We discussed next steps would be neurology follow up for further work up. His exam today is reassuring. Vitals are reassuring. ED records from 2/23/24 were reviewed. He had a full physical 6/2023. We also discussed starting cognitive behavioral therapy as soon as possible for the underlying anxiety and panic as well. There is no obvious emergent problem at this time. We had an in depth discussion on natural ways to alleviate his anxiety as well. He expresses understanding. We discussed ED precautions.

## 2024-03-22 NOTE — PROGRESS NOTES
[facility]        NAME: Peter Nguyen is a 33 y.o. male  : 1990    MRN: [id number]  DATE: 2024  TIME: 11:46 AM    Assessment and Plan   Acute nonintractable headache, unspecified headache type [R51.9]  1. Acute nonintractable headache, unspecified headache type  Ambulatory Referral to Neurology      2. Brain fog  Ambulatory Referral to Neurology            Patient Instructions   Headache: We discussed next steps would be neurology follow up for further work up. His exam today is reassuring. Vitals are reassuring. ED records from 24 were reviewed. He had a full physical 2023. We also discussed starting cognitive behavioral therapy as soon as possible for the underlying anxiety and panic as well. There is no obvious emergent problem at this time. We had an in depth discussion on natural ways to alleviate his anxiety as well. He expresses understanding. We discussed ED precautions.     Follow up with PCP in 3-5 days.  Proceed to  ER if symptoms worsen.    If tests have been performed at [facility], our office will contact you with results if changes need to be made to the care plan discussed with you at the visit.  You can review your full results on [facility].    Chief Complaint     Chief Complaint   Patient presents with    Cold Like Symptoms     Pt here issues x2 months feeling  headache, brain fog, dizziness  at times. Pt states he was seen  in the ER   3 weeks , at [place]  in ur[place] was told it was  most likely a panic attack.          History of Present Illness       The patient is a 33-year-old male with a hx of anxiety, asthma who presents today for a 2 month hx of ongoing intermittent headaches, positional dizziness, brain fog. He states that he will get 3-4 episodes per month. The episodes typically last the entire day but the dizziness is 1-2 seconds at a time. He states that three years back he began having panic attacks and anxiety.  He states that the  anxiety has no specific trigger and does not appear to be related to these episodes. He states that during an episode three weeks ago he went to the ED and had normal EKG, CMP/CBC and was told he had worsening panic disorder and to follow up with PCP. The patient is s/p lumbar discectomy and is undergoing PT currently. He is also being worked up for L shoulder pain and on 3/19/24 had toradol IM and decadron injection into the shoulder joint. He takes 0.25mg of xanax before bed. He has no syncope. No chest pain, dyspnea, chest tightness. No weakness, numbness or tingling of his extremities. No visual changes. No nausea, vomiting. No change in gait. He has been clean/sober for 12 years and goes to weekly meetings.       Review of Systems   Review of Systems   Constitutional:  Negative for activity change, appetite change, chills, diaphoresis, fatigue and fever.   HENT:  Negative for congestion, ear discharge, ear pain, facial swelling, hearing loss, postnasal drip, rhinorrhea, sinus pressure, sinus pain, sore throat, tinnitus, trouble swallowing and voice change.    Eyes:  Negative for visual disturbance.   Respiratory:  Negative for apnea, cough, chest tightness, shortness of breath, wheezing and stridor.    Cardiovascular:  Negative for chest pain, palpitations and leg swelling.   Gastrointestinal:  Negative for abdominal distention, abdominal pain, nausea and vomiting.   Genitourinary:  Negative for decreased urine volume.   Musculoskeletal:  Negative for arthralgias, joint swelling, myalgias, neck pain and neck stiffness.   Skin:  Negative for rash.   Allergic/Immunologic: Negative for immunocompromised state.   Neurological:  Positive for dizziness and headaches. Negative for tremors, seizures, syncope, speech difficulty, weakness, light-headedness and numbness.   Hematological:  Negative for adenopathy.   Psychiatric/Behavioral:  Negative for agitation, behavioral problems, confusion, decreased concentration,  "sleep disturbance and suicidal ideas. The patient is nervous/anxious.          Current Medications       Current Outpatient Medications:     albuterol (PROVENTIL HFA,VENTOLIN HFA) 90 mcg/act inhaler, Inhale 2 puffs every 4 (four) hours as needed for wheezing, Disp: 8.5 g, Rfl: 1    ALPRAZolam (XANAX) 0.25 mg tablet, Take 1 tablet (0.25 mg total) by mouth 2 (two) times a day as needed for anxiety, Disp: 60 tablet, Rfl: 0    cyclobenzaprine (FLEXERIL) 10 mg tablet, Take 1 tablet (10 mg total) by mouth daily at bedtime, Disp: 30 tablet, Rfl: 1    fluticasone (FLONASE) 50 mcg/act nasal spray, 1 spray into each nostril daily, Disp: , Rfl:     ketoconazole (NIZORAL) 2 % cream, Apply topically daily, Disp: 60 g, Rfl: 1    Zubsolv 2.9-0.71 MG SUBL, in the morning, Disp: , Rfl:     gabapentin (NEURONTIN) 100 mg capsule, Take 1 capsule (100 mg total) by mouth daily at bedtime (Patient not taking: Reported on 3/22/2024), Disp: 30 capsule, Rfl: 1    Current Allergies     Allergies as of 03/22/2024 - Reviewed 03/22/2024   Allergen Reaction Noted    Molds & smuts Itching 03/03/2015            The following portions of the patient's history were reviewed and updated as appropriate: allergies, current medications, past family history, past medical history, past social history, past surgical history and problem list.     Past Medical History:   Diagnosis Date    Asthma     Carpal tunnel syndrome     Right - Last Assessed: April 4, 2016     Exercise-induced asthma     Last Assessed: September 16, 2014       Past Surgical History:   Procedure Laterality Date    BACK SURGERY      HIP SURGERY         Family History   Problem Relation Age of Onset    Other Mother         Lymphedema     Substance Abuse Family     No Known Problems Father     No Known Problems Brother     Mental illness Neg Hx          Medications have been verified.        Objective   /80   Pulse 80   Temp 97.5 °F (36.4 °C)   Resp 18   Ht 5' 11\" (1.803 m)   Wt " 120 kg (265 lb)   SpO2 97%   BMI 36.96 kg/m²   No LMP for male patient.       Physical Exam     Physical Exam  Vitals and nursing note reviewed.   Constitutional:       General: He is not in acute distress.     Appearance: He is well-developed. He is not diaphoretic.   HENT:      Head: Normocephalic and atraumatic.      Right Ear: Hearing, tympanic membrane, ear canal and external ear normal.      Left Ear: Hearing, tympanic membrane, ear canal and external ear normal.      Nose: Nose normal. No mucosal edema or rhinorrhea.      Right Sinus: No maxillary sinus tenderness or frontal sinus tenderness.      Left Sinus: No maxillary sinus tenderness or frontal sinus tenderness.      Mouth/Throat:      Pharynx: Uvula midline. No oropharyngeal exudate, posterior oropharyngeal erythema or uvula swelling.      Tonsils: No tonsillar abscesses.   Eyes:      General: Vision grossly intact. Gaze aligned appropriately. No visual field deficit.     Extraocular Movements:      Right eye: Nystagmus present.      Left eye: Nystagmus (bilteral horizontal nystagmus) present.      Conjunctiva/sclera: Conjunctivae normal.      Pupils: Pupils are equal, round, and reactive to light. Pupils are equal.      Right eye: Pupil is round, reactive and not sluggish.      Left eye: Pupil is round, reactive and not sluggish.   Cardiovascular:      Rate and Rhythm: Normal rate and regular rhythm. No extrasystoles are present.     Pulses: Normal pulses.      Heart sounds: Normal heart sounds, S1 normal and S2 normal. Heart sounds not distant. No murmur heard.     No friction rub. No gallop. No S3 or S4 sounds.   Pulmonary:      Effort: No tachypnea, bradypnea, accessory muscle usage or respiratory distress.      Breath sounds: No decreased breath sounds, wheezing, rhonchi or rales.   Musculoskeletal:      Cervical back: Normal range of motion and neck supple.      Right lower leg: No edema.      Left lower leg: No edema.   Lymphadenopathy:       Cervical: No cervical adenopathy.   Neurological:      General: No focal deficit present.      Mental Status: He is alert and oriented to person, place, and time.      Cranial Nerves: Cranial nerves 2-12 are intact. No cranial nerve deficit or facial asymmetry.      Sensory: Sensation is intact. No sensory deficit.      Motor: Motor function is intact. No weakness or pronator drift.      Coordination: Coordination is intact. Romberg sign negative.      Gait: Gait is intact. Gait normal.      Deep Tendon Reflexes: Reflexes are normal and symmetric.   Psychiatric:         Behavior: Behavior normal.

## 2024-04-03 ENCOUNTER — OFFICE VISIT (OUTPATIENT)
Dept: FAMILY MEDICINE CLINIC | Facility: CLINIC | Age: 34
End: 2024-04-03
Payer: COMMERCIAL

## 2024-04-03 VITALS
SYSTOLIC BLOOD PRESSURE: 132 MMHG | BODY MASS INDEX: 36.26 KG/M2 | TEMPERATURE: 97.2 F | HEIGHT: 71 IN | HEART RATE: 81 BPM | RESPIRATION RATE: 14 BRPM | WEIGHT: 259 LBS | DIASTOLIC BLOOD PRESSURE: 90 MMHG | OXYGEN SATURATION: 99 %

## 2024-04-03 DIAGNOSIS — M54.12 CERVICAL RADICULOPATHY: Primary | ICD-10-CM

## 2024-04-03 DIAGNOSIS — F41.9 ANXIETY: ICD-10-CM

## 2024-04-03 PROCEDURE — 99213 OFFICE O/P EST LOW 20 MIN: CPT | Performed by: FAMILY MEDICINE

## 2024-04-03 RX ORDER — PREDNISONE 20 MG/1
TABLET ORAL
Qty: 14 TABLET | Refills: 0 | Status: SHIPPED | OUTPATIENT
Start: 2024-04-03

## 2024-04-04 RX ORDER — ALPRAZOLAM 0.25 MG/1
0.25 TABLET ORAL 2 TIMES DAILY PRN
Qty: 60 TABLET | Refills: 0 | Status: SHIPPED | OUTPATIENT
Start: 2024-04-04

## 2024-04-09 ENCOUNTER — OFFICE VISIT (OUTPATIENT)
Dept: PHYSICAL THERAPY | Facility: CLINIC | Age: 34
End: 2024-04-09
Payer: COMMERCIAL

## 2024-04-09 DIAGNOSIS — M25.512 ACUTE PAIN OF LEFT SHOULDER: Primary | ICD-10-CM

## 2024-04-09 PROCEDURE — 97112 NEUROMUSCULAR REEDUCATION: CPT

## 2024-04-09 PROCEDURE — 97110 THERAPEUTIC EXERCISES: CPT

## 2024-04-09 PROCEDURE — 97140 MANUAL THERAPY 1/> REGIONS: CPT

## 2024-04-09 NOTE — PATIENT INSTRUCTIONS
PLENTY OF FLUIDS - HYDRATION  REST  AVOID LIFTING OF PUSHING    MEDICATION AS DIRECTED    MONITOR SYMPTOMS    CALL NEXT WEEK WITH UPDATE

## 2024-04-09 NOTE — PROGRESS NOTES
Name: Peter Nguyen      : 1990      MRN: [id number]  Encounter Provider: Bhavesh Arroyo MD  Encounter Date: 4/3/2024   Encounter department: [facility]    Assessment & Plan     1. Cervical radiculopathy  -     predniSONE 20 mg tablet; 2 PO QD X 4 DAYS, THEN 1 PO QD X 4 DAYS, THEN 1/2 PO QD X 4 DAYS  -     XR spine cervical complete 4 or 5 vw non injury; Future; Expected date: 2024        Depression Screening and Follow-up Plan: Patient was screened for depression during today's encounter. They screened negative with a PHQ-2 score of 0.        Subjective      HPI  Review of Systems   Constitutional:  Negative for chills, fatigue and fever.   HENT:  Negative for sore throat.    Eyes:  Negative for discharge.   Respiratory:  Negative for cough and chest tightness.    Cardiovascular:  Negative for chest pain and palpitations.   Gastrointestinal:  Negative for abdominal pain, diarrhea, nausea and vomiting.   Musculoskeletal:  Positive for arthralgias, neck pain and neck stiffness. Negative for gait problem.   Neurological:  Positive for dizziness. Negative for weakness and headaches.   Hematological:  Negative for adenopathy.   Psychiatric/Behavioral:  The patient is not nervous/anxious.        Current Outpatient Medications on File Prior to Visit   Medication Sig   • albuterol (PROVENTIL HFA,VENTOLIN HFA) 90 mcg/act inhaler Inhale 2 puffs every 4 (four) hours as needed for wheezing   • fluticasone (FLONASE) 50 mcg/act nasal spray 1 spray into each nostril daily   • ketoconazole (NIZORAL) 2 % cream Apply topically daily   • Zubsolv 2.9-0.71 MG SUBL in the morning   • [DISCONTINUED] cyclobenzaprine (FLEXERIL) 10 mg tablet Take 1 tablet (10 mg total) by mouth daily at bedtime (Patient not taking: Reported on 4/3/2024)   • [DISCONTINUED] gabapentin (NEURONTIN) 100 mg capsule Take 1 capsule (100 mg total) by mouth daily at bedtime (Patient not taking: Reported on  "3/22/2024)       Objective     /90 (BP Location: Left arm, Patient Position: Sitting, Cuff Size: Large)   Pulse 81   Temp (!) 97.2 °F (36.2 °C) (Temporal)   Resp 14   Ht 5' 11\" (1.803 m)   Wt 117 kg (259 lb)   SpO2 99%   BMI 36.12 kg/m²     Physical Exam  Vitals reviewed.   Constitutional:       General: He is not in acute distress.     Appearance: Normal appearance. He is well-developed. He is not ill-appearing.   HENT:      Head: Normocephalic and atraumatic.   Eyes:      General:         Right eye: No discharge.         Left eye: No discharge.      Conjunctiva/sclera: Conjunctivae normal.      Pupils: Pupils are equal, round, and reactive to light.   Neck:      Thyroid: No thyromegaly.      Vascular: No JVD.   Cardiovascular:      Rate and Rhythm: Normal rate and regular rhythm.      Heart sounds: Normal heart sounds. No murmur heard.  Pulmonary:      Effort: Pulmonary effort is normal.      Breath sounds: Normal breath sounds. No wheezing or rales.   Abdominal:      General: Bowel sounds are normal.      Palpations: Abdomen is soft. There is no mass.      Tenderness: There is no abdominal tenderness. There is no guarding or rebound.   Musculoskeletal:         General: Tenderness present. No deformity.      Cervical back: Neck supple.      Comments: MILD CERVICAL AND L SUPRASCAPULAR / SHOULDER DISCOMFORT WITH PALPATION AND MOVEMENT    NO ALTERATION OF SENSATION NOTED   Lymphadenopathy:      Cervical: No cervical adenopathy.   Skin:     General: Skin is warm and dry.      Findings: No erythema or rash.   Neurological:      General: No focal deficit present.      Mental Status: He is alert and oriented to person, place, and time.      Cranial Nerves: No cranial nerve deficit.      Sensory: No sensory deficit.      Motor: No weakness.      Coordination: Coordination normal.      Gait: Gait normal.      Deep Tendon Reflexes: Reflexes normal.   Psychiatric:         Mood and Affect: Mood normal.         " Behavior: Behavior normal.         Thought Content: Thought content normal.         Judgment: Judgment normal.       Bhavesh Arroyo MD

## 2024-04-09 NOTE — PROGRESS NOTES
Daily Note     Today's date: 2024  Patient name: Peter Nguyen  : 1990  MRN: [id number]  Referring provider: No ref. provider found  Dx:   Encounter Diagnosis   Name Primary?    Acute pain of left shoulder Yes                  Subjective: Pt reports that his shoulder pain has improved but has been experiencing increased left scapular pain that increases when rounding shoulders. Pt had a consult with PCP and was given a script to initiate PT.       Objective: See treatment diary below      Assessment: Pt presents with IR'ed shoulders and forward head posture at rest. Correction of posture minimizes symptoms in medial border of scapula. Instructed in postural correction and initial HEP provided: [id number]      Plan: Re-evaluation next visit for addition of cervical spine in POC.         Precautions standard       Manuals 3/19 4/9      STM Pec minor release Pec minor release                              Neuro Re-Ed         Scap retraction  x10 Hold 5, 2x10       Chin tuck  Hold 5, 2x10                                               Ther Ex        Pec stretch Door Hold 10, 1x10  Door Hold 10, 1x10                                                               Ther Activity                        Gait Training                        Modalities                              Insurance:  AMA/CMS Eval/ Re-eval Auth #/ Referral # Total units or visits Start date  Expiration date KX? Visit limitation?  PT only or  PT+OT? Co-Insurance   CMS   2    30 PCY                   POC Start Date POC Expiration Date Signed POC?   3/19/24 4/19/24        Date               Visits/Units:  Used 1              Authed:  Remaining  29

## 2024-04-11 ENCOUNTER — EVALUATION (OUTPATIENT)
Dept: PHYSICAL THERAPY | Facility: CLINIC | Age: 34
End: 2024-04-11
Payer: COMMERCIAL

## 2024-04-11 DIAGNOSIS — M54.12 CERVICAL RADICULOPATHY: Primary | ICD-10-CM

## 2024-04-11 DIAGNOSIS — M25.512 ACUTE PAIN OF LEFT SHOULDER: ICD-10-CM

## 2024-04-11 PROCEDURE — 97140 MANUAL THERAPY 1/> REGIONS: CPT

## 2024-04-11 PROCEDURE — 97164 PT RE-EVAL EST PLAN CARE: CPT

## 2024-04-11 NOTE — PROGRESS NOTES
PT Re-Evaluation     Today's date: 2024  Patient name: Peter Nguyen  : 1990  MRN: [id number]  Referring provider: Self  Dx:   Encounter Diagnosis     ICD-10-CM    1. Cervical radiculopathy  M54.12       2. Acute pain of left shoulder  M25.512                        Assessment  Assessment details: Peter Nguyen is a 33 y.o. male who has been seen in physical therapy for 2 visits secondary to the diagnosis of Acute pain of left shoulder  (primary encounter diagnosis) and is making steady gains towards established goals. The patient presents with a new script for evaluation of cervical spine for radiculopathy. Pt presents with poor posture, mobility limitations in shoulder and thoracic spine, left UE weakness, impaired function, and decreased activity tolerance. .He would benefit from continued PT services to address new onset of symptoms for return to PLOF.   Impairments: abnormal muscle tone, abnormal or restricted ROM, abnormal movement, activity intolerance, impaired physical strength, lacks appropriate home exercise program, pain with function and scapular dyskinesis    Symptom irritability: moderateBarriers to therapy:     Understanding of Dx/Px/POC: good   Prognosis: good    Goals  Short Term Goals (2-4 weeks)  Pt will be independent in basic Home Exercise Program-met  Pt will demonstrate improved postural awareness with no cueing required from PT -ongoing  Pt will demonstrate improved left shoulder to WNL-ongoing    Long Term Goals (4-6 weeks)  Pt will be independent in comprehensive Home Exercise Program  Pt will be able to perform all ADLs with pain no more than 2/10  Pt will demonstrate improved FOTO status score by 10 points  Pt will reports a decrease in radicular symptoms of 50%       Plan  Patient would benefit from: skilled PT  Referral necessary: No  Planned modality interventions: cryotherapy and thermotherapy: hydrocollator packs  Planned therapy interventions: ADL  "retraining, body mechanics training, functional ROM exercises, home exercise program, graded exercise, joint mobilization, manual therapy, massage, neuromuscular re-education, patient education, postural training, strengthening, stretching, therapeutic activities, therapeutic exercise and therapeutic training  Frequency: 2x week  Duration in weeks: 6  Treatment plan discussed with: patient        Subjective Evaluation    History of Present Illness  Mechanism of injury: Pt reports his left anterior shoulder pain has resolved but has now been experiencing increasing left shoulder blade numbness and \"itching\" the past couple of weeks. Pt states that his discomfort will increase when sitting with back unsupported or hunched forward. Pt presents with PT script for cervical radiculopathy. Denies any symptoms down the arm or localized neck pain.           Recurrent probem    Patient Goals  Patient goals for therapy: decreased pain and independence with ADLs/IADLs  Patient goal: reach pain free (met)  Pain  Current pain ratin  At best pain ratin  At worst pain ratin  Location: left shoulder blade  Quality: dull ache and burning  Relieving factors: change in position    Social Support    Employment status: working  Hand dominance: right      Diagnostic Tests  No diagnostic tests performed  Treatments  Previous treatment: injection treatment      Objective     Concurrent Complaints  Positive for dizziness and headaches. Negative for night pain and disturbed sleep    Postural Observations  Seated posture: poor  Standing posture: fair  Correction of posture: makes symptoms better    Additional Postural Observation Details  Forward head, rounded shoulders    Palpation   Left   Hypertonic in the levator scapulae, pectoralis minor, suboccipitals and upper trapezius.   Tenderness of the pectoralis minor and suboccipitals.   Trigger point to pectoralis minor.     Right   Hypertonic in the pectoralis minor.     Additional " Palpation Details  Moderate left pec minor TTP    Cervical/Thoracic Screen   Cervical range of motion within normal limits  Thoracic range of motion within normal limits with the following exceptions: Into extension and rotation    Neurological Testing     Sensation     Shoulder   Left Shoulder   Intact: light touch    Right Shoulder   Intact: Light touch    Active Range of Motion   Cervical/Thoracic Spine       Cervical    Subcranial retraction:   Restriction level: minimal  Flexion:  WFL  Extension:  WFL  Left lateral flexion:  WFL  Right lateral flexion:  WFL  Left rotation:  WFL  Right rotation:  WFL    Thoracic    Left rotation:  Restriction level: moderate  Right rotation:  Restriction level: moderate  Left Shoulder   Flexion: 180 degrees   Abduction: 180 degrees   External rotation 45°: 60 degrees with pain  Internal rotation 45°: 65 degrees     Right Shoulder   Normal active range of motion  Flexion: 180 degrees   Abduction: 180 degrees   External rotation 45°: 80 degrees   Internal rotation 45°: 65 degrees     Scapular Mobility   Left Shoulder   Scapular mobility: fair  Scapular Mobility with Shoulder to 90° FF   Upward rotation: inadequate    Joint Play   Left Shoulder  Joints within functional limits are the 1st rib. Hypomobile in the thoracic spine.    Right Shoulder  Joints within functional limits are the 1st rib.     Hypomobile: C5, C6 and C7   Mechanical Assessment    Cervical    Seated Flexion:  repeated movements  Pain location: no change  Seated Extension: repeated movements  Pain location: no change    Thoracic      Lumbar      Strength/Myotome Testing     Left Shoulder     Planes of Motion   Flexion: 4+   Extension: 4   Abduction: 4+   External rotation at 0°: 5   Internal rotation at 0°: 5     Right Shoulder     Planes of Motion   Flexion: 5   Extension: 5   Abduction: 5   External rotation at 0°: 5   Internal rotation at 0°: 5     Tests     Left Shoulder   Negative drop arm, empty can,  Hawkin's and painful arc.   Neuro Exam:     Headaches   Patient reports headaches: Yes.   Graphical documentation:              [employer] HEP [id number]  Precautions standard       Manuals 3/19 4/9 4/11     STM Pec minor release Pec minor release Pec minor release     PROM   Left shoulder ER     mobs   Left GHJ post gr III             Neuro Re-Ed         Scap retraction  x10 Hold 5, 2x10  Hold 5, 2x10      Chin tuck  Hold 5, 2x10                                               Ther Ex        Pec stretch Door Hold 10, 1x10  Door Hold 10, 1x10       Cane ER   Hold 5, 2x10                                                      Ther Activity                        Gait Training                        Modalities                              Insurance:  AMA/CMS Eval/ Re-eval Auth #/ Referral # Total units or visits Start date  Expiration date KX? Visit limitation?  PT only or  PT+OT? Co-Insurance   CMS   2    30 PCY                   POC Start Date POC Expiration Date Signed POC?   3/19/24 4/19/24        Date 4/9 4/11             Visits/Units:  Used 1 2             Authed:  Remaining  29 28

## 2024-04-16 ENCOUNTER — APPOINTMENT (OUTPATIENT)
Dept: PHYSICAL THERAPY | Facility: CLINIC | Age: 34
End: 2024-04-16
Payer: COMMERCIAL

## 2024-04-16 DIAGNOSIS — Z13.29 SCREENING FOR HYPOTHYROIDISM: ICD-10-CM

## 2024-04-16 DIAGNOSIS — Z13.6 SCREENING FOR HYPERTENSION: ICD-10-CM

## 2024-04-16 DIAGNOSIS — Z13.220 SCREENING FOR HYPERLIPIDEMIA: ICD-10-CM

## 2024-04-16 DIAGNOSIS — Z00.00 ROUTINE GENERAL MEDICAL EXAMINATION AT A HEALTH CARE FACILITY: Primary | ICD-10-CM

## 2024-04-17 ENCOUNTER — OFFICE VISIT (OUTPATIENT)
Age: 34
End: 2024-04-17
Payer: COMMERCIAL

## 2024-04-17 DIAGNOSIS — M21.961 ACQUIRED DEFORMITY OF BOTH FEET: Primary | ICD-10-CM

## 2024-04-17 DIAGNOSIS — M21.962 ACQUIRED DEFORMITY OF BOTH FEET: Primary | ICD-10-CM

## 2024-04-17 DIAGNOSIS — M21.41 ACQUIRED FLAT FOOT, RIGHT: ICD-10-CM

## 2024-04-17 DIAGNOSIS — M77.41 METATARSALGIA OF BOTH FEET: ICD-10-CM

## 2024-04-17 DIAGNOSIS — M77.42 METATARSALGIA OF BOTH FEET: ICD-10-CM

## 2024-04-17 DIAGNOSIS — M21.42 ACQUIRED FLAT FOOT, LEFT: ICD-10-CM

## 2024-04-17 PROCEDURE — L3000 FT INSERT UCB BERKELEY SHELL: HCPCS | Performed by: PODIATRIST

## 2024-04-17 NOTE — PROGRESS NOTES
Assessment/Plan:  Metatarsalgia/pain secondary to acquired deformity of foot.  Acquired pes planus bilateral.    Plan.  Chart reviewed.  Today potation presents for orthotic pickup.  Patient will use these daily to control deformity and ease pain.  Aftercare instruction given       Diagnoses and all orders for this visit:    Acquired deformity of both feet    Acquired flat foot, left    Acquired flat foot, right    Metatarsalgia of both feet          Subjective: Patient has pain in his feet with ambulation.    Allergies   Allergen Reactions    Molds & Smuts Itching         Current Outpatient Medications:     albuterol (PROVENTIL HFA,VENTOLIN HFA) 90 mcg/act inhaler, Inhale 2 puffs every 4 (four) hours as needed for wheezing, Disp: 8.5 g, Rfl: 1    ALPRAZolam (XANAX) 0.25 mg tablet, Take 1 tablet (0.25 mg total) by mouth 2 (two) times a day as needed for anxiety, Disp: 60 tablet, Rfl: 0    fluticasone (FLONASE) 50 mcg/act nasal spray, 1 spray into each nostril daily, Disp: , Rfl:     ketoconazole (NIZORAL) 2 % cream, Apply topically daily, Disp: 60 g, Rfl: 1    predniSONE 20 mg tablet, 2 PO QD X 4 DAYS, THEN 1 PO QD X 4 DAYS, THEN 1/2 PO QD X 4 DAYS, Disp: 14 tablet, Rfl: 0    Zubsolv 2.9-0.71 MG SUBL, in the morning, Disp: , Rfl:     Patient Active Problem List   Diagnosis    Anxiety    Mild intermittent asthma without complication    Panic disorder without agoraphobia    Chronic midline low back pain with left-sided sciatica    Intervertebral disc disorders with radiculopathy, lumbar region          Patient ID: Peter Nguyen is a 33 y.o. male.    HPI    The following portions of the patient's history were reviewed and updated as appropriate:     family history includes No Known Problems in his brother and father; Other in his mother; Substance Abuse in his family.      reports that he quit smoking about 11 years ago. His smoking use included cigarettes. He started smoking about 15 years ago. He has a 4  pack-year smoking history. He has been exposed to tobacco smoke. He has quit using smokeless tobacco. He reports that he does not drink alcohol and does not use drugs.    There were no vitals filed for this visit.    Review of Systems      Objective:  Patient's shoes and socks removed.   Foot ExamPhysical Exam      General  General Appearance: appears stated age and healthy   Orientation: alert and oriented to person, place, and time   Affect: appropriate   Gait: antalgic         Right Foot/Ankle      Inspection and Palpation  Swelling: none   Arch: pes planus  Hallux limitus: yes  Skin Exam: dry skin and tinea;      Neurovascular  Dorsalis pedis: 3+  Posterior tibial: 3+  Superficial peroneal nerve sensation: diminished  Deep peroneal nerve sensation: diminished  Sural nerve sensation: diminished        Left Foot/Ankle       Inspection and Palpation  Swelling: none   Arch: pes planus  Hallux limitus: yes  Skin Exam: dry skin and tinea;      Neurovascular  Dorsalis pedis: 3+  Posterior tibial: 3+  Superficial peroneal nerve sensation: diminished  Deep peroneal nerve sensation: diminished  Sural nerve sensation: diminished        Physical Exam  Vitals and nursing note reviewed.   Constitutional:       Appearance: Normal appearance.   Cardiovascular:      Rate and Rhythm: Normal rate and regular rhythm.      Pulses:           Dorsalis pedis pulses are 3+ on the right side and 3+ on the left side.        Posterior tibial pulses are 3+ on the right side and 3+ on the left side.   Feet:      Right foot:      Skin integrity: Dry skin present.      Left foot:      Skin integrity: Dry skin present.   Skin:     General: Skin is warm and dry.      Comments: Patient has moccasin tinea pedis/dermatophytosis bilateral.   Neurological:      Mental Status: He is alert.   Psychiatric:         Mood and Affect: Mood normal.         Behavior: Behavior normal.         Thought Content: Thought content normal.         Judgment: Judgment  normal.

## 2024-04-18 ENCOUNTER — OFFICE VISIT (OUTPATIENT)
Dept: PHYSICAL THERAPY | Facility: CLINIC | Age: 34
End: 2024-04-18
Payer: COMMERCIAL

## 2024-04-18 DIAGNOSIS — M54.12 CERVICAL RADICULOPATHY: Primary | ICD-10-CM

## 2024-04-18 DIAGNOSIS — M25.512 ACUTE PAIN OF LEFT SHOULDER: ICD-10-CM

## 2024-04-18 PROCEDURE — 97140 MANUAL THERAPY 1/> REGIONS: CPT

## 2024-04-18 PROCEDURE — 97110 THERAPEUTIC EXERCISES: CPT

## 2024-04-18 NOTE — PROGRESS NOTES
June 26, 2017      Óscar Chapa  [address] [id number]        Dear Óscar,    We were unable to reach you by phone or leave a voicemail.  We are writing you with results and further instructions regarding your recent halter monitor testing.      Your halter monitor test(s) from your last visit have returned.  It does show a slight decrease in your pulse.     ** I would like you to decrease your atenolol by half.  So, instead of taking 50 mg daily you should take 1/2 tablet or 25 mg daily.      I am hoping this will help you feel better.  If you have any further questions, please feel free to call.     Resulted Orders   HOLTER MONITOR    Narrative    Monitor placed by Tyesha Blas on 6/14/2017.  The patient was instructed and understands monitor use and event transmission procedure.    Does the patient have a pacemaker?  No    Holter Monitor  serial #:     Time to be monitored: 48 hr       Impression    48 Hour Holter Monitor Report    Provider requesting Holter monitor:  Stephanie    Primary care provider:  Prateek Brown MD    Indication:  Afib      Interpretation:  The patient's baseline rhythm is normal sinus rhythm with an average heart rate of 65 beats per minute (BPM). The minimum heart rate is 54 BPM and the maximum heart rate is 96 BPM. The patient was in tachycardia for 0% and in bradycardia for 27% if the monitoring period. Patient's maximal heart R-R interval is 1.5 seconds.      There are no obvious ST-T segment changes noted.    The patient had 378 ectopic ventricular beats, accounting for 0.2% of the total QRS complexes. There were no runs of ectopic beats observed.    The patient had 241 ectopic supraventricular beats, accounting for 0.1% of the total QRS complexes. The longest run of supraventricular beats was 6 beats.    Patient documented symptoms of fatigue with standing which corresponded to normal sinus rhythm and sinus bradycardia; there was one transmission that  Daily Note     Today's date: 2024  Patient name: Peter Nguyen  : 1990  MRN: [id number]  Referring provider: Bhavesh Arroyo MD  Dx:   Encounter Diagnoses   Name Primary?    Cervical radiculopathy Yes    Acute pain of left shoulder                   Subjective: Pt reports his symptoms are the same las last week. Numbness increases with unsupported seated positions and washing hands      Objective: See treatment diary below      Assessment: Pt tolerated treatment well with no complaints of pain. Pt presents with posterior rib at level T7 with TTP at paraspinals in area. Symptoms improved with MET and mobilization of ribs and thoracis spine. Added self MET to HEP.       Plan: Continue with plan of care to decrease pain, improve mobility, strength, and function.          Precautions standard       Manuals 3/19 4/9 4/18     STM Pec minor release Pec minor release Pec minor release, TS parspinals left     MET   Post rib T7     mobs   TS PA gr III             Neuro Re-Ed         Scap retraction  x10 Hold 5, 2x10       Chin tuck  Hold 5, 2x10                                               Ther Ex        Pec stretch Door Hold 10, 1x10  Door Hold 10, 1x10       Self MET   x5     TS rotation   Hold 5, 2x10                                              Ther Activity                        Gait Training                        Modalities                              Insurance:  AMA/CMS Eval/ Re-eval Auth #/ Referral # Total units or visits Start date  Expiration date KX? Visit limitation?  PT only or  PT+OT? Co-Insurance   CMS   2    30 PCY                   POC Start Date POC Expiration Date Signed POC?   3/19/24 4/19/24        Date             Visits/Units:  Used 1 2 3            Authed:  Remaining  29 28 27                               demonstrated sinus rhythm with isolated PVC.    Impression:  1. The patient's baseline rhythm is normal sinus rhythm with average heart rate of 65 bpm. The patient spent 27% of the monitoring period in bradycardia.    2. No sustained arrhythmias were noted during the monitoring period.     3. The patient reported symptoms of fatigue with standing which corresponded to normal sinus rhythm and sinus bradycardia; there was one transmission that demonstrated sinus rhythm with isolated PVC.             Sincerely,           Dr. Prateek Brown   [facility] - [address] [address]

## 2024-04-23 ENCOUNTER — OFFICE VISIT (OUTPATIENT)
Dept: PHYSICAL THERAPY | Facility: CLINIC | Age: 34
End: 2024-04-23
Payer: COMMERCIAL

## 2024-04-23 DIAGNOSIS — M25.512 ACUTE PAIN OF LEFT SHOULDER: ICD-10-CM

## 2024-04-23 DIAGNOSIS — M54.12 CERVICAL RADICULOPATHY: Primary | ICD-10-CM

## 2024-04-23 PROCEDURE — 97110 THERAPEUTIC EXERCISES: CPT

## 2024-04-23 PROCEDURE — 97140 MANUAL THERAPY 1/> REGIONS: CPT

## 2024-04-23 NOTE — PROGRESS NOTES
Daily Note     Today's date: 2024  Patient name: Peter Nguyen  : 1990  MRN: [id number]  Referring provider: Bhavesh Arroyo MD  Dx:   Encounter Diagnoses   Name Primary?    Cervical radiculopathy Yes    Acute pain of left shoulder                   Subjective: Pt reports he has noticed a slight decrease in symptoms in mid back since last week.       Objective: See treatment diary below      Assessment: Pt tolerated treatment well with no complaints of pain. Pt presents with continued post rib and significant TTP of thoracic paraspinal. Symptoms improved 50% by end of session. Added TS extn over foam roll for HEP.      Plan: Continue with plan of care to decrease pain, improve mobility, strength, and function.          Precautions standard       Manuals 3/19 4/9 4/18 4/23    STM Pec minor release Pec minor release Pec minor release, TS parspinals left TS parspinals left    MET   Post rib T7 Post rib T7    mobs   TS PA gr III TS PA gr III            Neuro Re-Ed         Scap retraction  x10 Hold 5, 2x10   Hold 5, 2x10    Chin tuck  Hold 5, 2x10                                               Ther Ex        Pec stretch Door Hold 10, 1x10  Door Hold 10, 1x10       Self MET   x5 x5    TS rotation   Hold 5, 2x10      TS extension    Hold 5, 2x10                                     Ther Activity                        Gait Training                        Modalities                              Insurance:  AMA/CMS Eval/ Re-eval Auth #/ Referral # Total units or visits Start date  Expiration date KX? Visit limitation?  PT only or  PT+OT? Co-Insurance   CMS   2    30 PCY                   POC Start Date POC Expiration Date Signed POC?   3/19/24 4/19/24        Date            Visits/Units:  Used 1 2 3 4           Authed:  Remaining  29 28 27 26                              
no

## 2024-04-25 ENCOUNTER — OFFICE VISIT (OUTPATIENT)
Dept: PHYSICAL THERAPY | Facility: CLINIC | Age: 34
End: 2024-04-25
Payer: COMMERCIAL

## 2024-04-25 DIAGNOSIS — M54.12 CERVICAL RADICULOPATHY: Primary | ICD-10-CM

## 2024-04-25 DIAGNOSIS — M25.512 ACUTE PAIN OF LEFT SHOULDER: ICD-10-CM

## 2024-04-25 PROCEDURE — 97110 THERAPEUTIC EXERCISES: CPT

## 2024-04-25 PROCEDURE — 97140 MANUAL THERAPY 1/> REGIONS: CPT

## 2024-04-25 NOTE — PROGRESS NOTES
Daily Note     Today's date: 2024  Patient name: Peter Nguyen  : 1990  MRN: [id number]  Referring provider: Bhavesh Arroyo MD  Dx:   Encounter Diagnoses   Name Primary?    Cervical radiculopathy Yes    Acute pain of left shoulder                   Subjective: Pt reports he had an increase in symptoms that started yesterday and are now constant. Rated 5/10.       Objective: See treatment diary below      Assessment: Pt tolerated treatment well with minimal complaints of pain. Pt continues with left sided posterior rib and significant TTP along thoracic paraspinals. Cervical spine repeated motions tested and no significant change in symptoms reported.        Plan: Continue with plan of care to decrease pain, improve mobility, strength, and function.          Precautions standard       Manuals 3/19 4/9 4/18 4/23 4/25   STM Pec minor release Pec minor release Pec minor release, TS parspinals left TS parspinals left TS parspinals left   MET   Post rib T7 Post rib T7 Post rib T7   mobs   TS PA gr III TS PA gr III TS PA gr III           Neuro Re-Ed         Scap retraction  x10 Hold 5, 2x10   Hold 5, 2x10    Chin tuck  Hold 5, 2x10                                               Ther Ex        Pec stretch Door Hold 10, 1x10  Door Hold 10, 1x10       Self MET   x5 x5    TS rotation   Hold 5, 2x10   Hold 5, 2x10    TS extension    Hold 5, 2x10     Repeated motions     Flex/ext/retraction                           Ther Activity                        Gait Training                        Modalities                              Insurance:  AMA/CMS Eval/ Re-eval Auth #/ Referral # Total units or visits Start date  Expiration date KX? Visit limitation?  PT only or  PT+OT? Co-Insurance   CMS   2    30 PCY                   POC Start Date POC Expiration Date Signed POC?   3/19/24 4/19/24        Date           Visits/Units:  Used 1 2 3 4 5          Authed:  Remaining  29 28 27 26 25

## 2024-04-29 ENCOUNTER — OFFICE VISIT (OUTPATIENT)
Age: 34
End: 2024-04-29
Payer: COMMERCIAL

## 2024-04-29 VITALS — BODY MASS INDEX: 35.98 KG/M2 | WEIGHT: 257 LBS | RESPIRATION RATE: 16 BRPM | HEIGHT: 71 IN

## 2024-04-29 DIAGNOSIS — A69.23 LYME ARTHRITIS (HCC): ICD-10-CM

## 2024-04-29 DIAGNOSIS — M21.962 ACQUIRED DEFORMITY OF LEFT FOOT: ICD-10-CM

## 2024-04-29 DIAGNOSIS — B35.9 DERMATOPHYTOSIS: ICD-10-CM

## 2024-04-29 DIAGNOSIS — M21.42 ACQUIRED FLAT FOOT, LEFT: ICD-10-CM

## 2024-04-29 DIAGNOSIS — M25.572 ARTHRALGIA OF LEFT FOOT: Primary | ICD-10-CM

## 2024-04-29 DIAGNOSIS — M21.41 ACQUIRED FLAT FOOT, RIGHT: ICD-10-CM

## 2024-04-29 DIAGNOSIS — M54.16 RADICULOPATHY OF LUMBAR REGION: ICD-10-CM

## 2024-04-29 PROCEDURE — 99213 OFFICE O/P EST LOW 20 MIN: CPT | Performed by: PODIATRIST

## 2024-04-29 PROCEDURE — 20600 DRAIN/INJ JOINT/BURSA W/O US: CPT | Performed by: PODIATRIST

## 2024-04-29 RX ORDER — MELOXICAM 15 MG/1
15 TABLET ORAL DAILY
Qty: 30 TABLET | Refills: 0 | Status: SHIPPED | OUTPATIENT
Start: 2024-04-29 | End: 2024-05-29

## 2024-04-29 NOTE — PROGRESS NOTES
"Assessment/Plan: Radiculopathy.  Dovray tinea pedis.  Acquired deformity foot bilateral.  Acquired pes planus.  Early hallux rigidus deformity right.  Arthralgia second MPJ.  Rule out Lyme.  Pain upon ambulation.  Acquired pes planus.    Plan.  Chart reviewed.  Primary care notes reviewed.  Patient examined.  Patient advised on condition.  At this time we will perform arthrocentesis second left MPJ.  This was done without pain or complication.  Patient will restart gabapentin.  In addition he will be placed on Mobic.  Blood work ordered as well inflammatory arthropathy.  Patient will also use orthotics as directed.  Return for follow-up.    Small joint arthrocentesis: L second MTP  Universal Protocol:  Consent: Verbal consent obtained.  Risks and benefits: risks, benefits and alternatives were discussed  Consent given by: patient  Time out: Immediately prior to procedure a \"time out\" was called to verify the correct patient, procedure, equipment, support staff and site/side marked as required.  Timeout called at: 4/29/2024 3:28 PM.  Patient understanding: patient states understanding of the procedure being performed  Patient identity confirmed: verbally with patient  Supporting Documentation  Indications: pain and joint swelling   Procedure Details  Location: second toe - L second MTP  Needle size: 25 G  Ultrasound guidance: no  Approach: dorsal  Medications administered: 10 mg triamcinolone acetonide 10 mg/mL    Patient tolerance: patient tolerated the procedure well with no immediate complications  Dressing:  Sterile dressing applied                 Diagnoses and all orders for this visit:    Arthralgia of left foot  -     Lyme Total AB W Reflex to IGM/IGG; Future  -     Rheumatoid factor quant (Reflex Only- Do Not Order); Future  -     MEÑO Screen w/ Reflex to Titer/Pattern; Future  -     Lyme Total AB W Reflex to IGM/IGG  -     meloxicam (MOBIC) 15 mg tablet; Take 1 tablet (15 mg total) by mouth daily    Lyme " arthritis (HCC)  -     Lyme Total AB W Reflex to IGM/IGG; Future  -     Rheumatoid factor quant (Reflex Only- Do Not Order); Future  -     MEÑO Screen w/ Reflex to Titer/Pattern; Future  -     Lyme Total AB W Reflex to IGM/IGG  -     meloxicam (MOBIC) 15 mg tablet; Take 1 tablet (15 mg total) by mouth daily    Radiculopathy of lumbar region  -     meloxicam (MOBIC) 15 mg tablet; Take 1 tablet (15 mg total) by mouth daily    Dermatophytosis  -     meloxicam (MOBIC) 15 mg tablet; Take 1 tablet (15 mg total) by mouth daily    Acquired flat foot, left  -     meloxicam (MOBIC) 15 mg tablet; Take 1 tablet (15 mg total) by mouth daily    Acquired flat foot, right  -     meloxicam (MOBIC) 15 mg tablet; Take 1 tablet (15 mg total) by mouth daily    Acquired deformity of left foot  -     meloxicam (MOBIC) 15 mg tablet; Take 1 tablet (15 mg total) by mouth daily    Other orders  -     Small joint arthrocentesis          Subjective: Patient is doing better but he still has generalized foot and leg pain.  He suffers from chronic low back pain.  He was unable to take gabapentin at this time.    Allergies   Allergen Reactions    Molds & Smuts Itching         Current Outpatient Medications:     meloxicam (MOBIC) 15 mg tablet, Take 1 tablet (15 mg total) by mouth daily, Disp: 30 tablet, Rfl: 0    albuterol (PROVENTIL HFA,VENTOLIN HFA) 90 mcg/act inhaler, Inhale 2 puffs every 4 (four) hours as needed for wheezing, Disp: 8.5 g, Rfl: 1    ALPRAZolam (XANAX) 0.25 mg tablet, Take 1 tablet (0.25 mg total) by mouth 2 (two) times a day as needed for anxiety, Disp: 60 tablet, Rfl: 0    fluticasone (FLONASE) 50 mcg/act nasal spray, 1 spray into each nostril daily, Disp: , Rfl:     ketoconazole (NIZORAL) 2 % cream, Apply topically daily, Disp: 60 g, Rfl: 1    predniSONE 20 mg tablet, 2 PO QD X 4 DAYS, THEN 1 PO QD X 4 DAYS, THEN 1/2 PO QD X 4 DAYS, Disp: 14 tablet, Rfl: 0    Zubsolv 2.9-0.71 MG SUBL, in the morning, Disp: , Rfl:     Patient  Active Problem List   Diagnosis    Anxiety    Mild intermittent asthma without complication    Panic disorder without agoraphobia    Chronic midline low back pain with left-sided sciatica    Intervertebral disc disorders with radiculopathy, lumbar region          Patient ID: Peter Nguyen is a 33 y.o. male.    HPI    The following portions of the patient's history were reviewed and updated as appropriate:     family history includes No Known Problems in his brother and father; Other in his mother; Substance Abuse in his family.      reports that he quit smoking about 11 years ago. His smoking use included cigarettes. He started smoking about 15 years ago. He has a 4 pack-year smoking history. He has been exposed to tobacco smoke. He has quit using smokeless tobacco. He reports that he does not drink alcohol and does not use drugs.    Vitals:    04/29/24 1512   Resp: 16       Review of Systems      Objective:  Patient's shoes and socks removed.   Foot ExamPhysical Exam        General  General Appearance: appears stated age and healthy   Orientation: alert and oriented to person, place, and time   Affect: appropriate   Gait: antalgic         Right Foot/Ankle      Inspection and Palpation  Swelling: none   Arch: pes planus  Hallux limitus: yes  Skin Exam: dry skin and tinea;      Neurovascular  Dorsalis pedis: 3+  Posterior tibial: 3+  Superficial peroneal nerve sensation: diminished  Deep peroneal nerve sensation: diminished  Sural nerve sensation: diminished        Left Foot/Ankle       Inspection and Palpation  Swelling: none   Arch: pes planus  Hallux limitus: yes  Skin Exam: dry skin and tinea;      Neurovascular  Dorsalis pedis: 3+  Posterior tibial: 3+  Superficial peroneal nerve sensation: diminished  Deep peroneal nerve sensation: diminished  Sural nerve sensation: diminished        Physical Exam  Vitals and nursing note reviewed.   Constitutional:       Appearance: Normal appearance.   Cardiovascular:       Rate and Rhythm: Normal rate and regular rhythm.      Pulses:           Dorsalis pedis pulses are 3+ on the right side and 3+ on the left side.        Posterior tibial pulses are 3+ on the right side and 3+ on the left side.   Feet:      Right foot:      Skin integrity: Dry skin present.      Left foot:      Skin integrity: Dry skin present.   Skin:     General: Skin is warm and dry.      Comments: Patient has moccasin tinea pedis/dermatophytosis bilateral.   Neurological:      Mental Status: He is alert.   Psychiatric:         Mood and Affect: Mood normal.         Behavior: Behavior normal.         Thought Content: Thought content normal.         Judgment: Judgment normal.

## 2024-04-30 ENCOUNTER — OFFICE VISIT (OUTPATIENT)
Dept: PHYSICAL THERAPY | Facility: CLINIC | Age: 34
End: 2024-04-30
Payer: COMMERCIAL

## 2024-04-30 DIAGNOSIS — M54.12 CERVICAL RADICULOPATHY: Primary | ICD-10-CM

## 2024-04-30 PROCEDURE — 97140 MANUAL THERAPY 1/> REGIONS: CPT

## 2024-04-30 NOTE — PROGRESS NOTES
Daily Note     Today's date: 2024  Patient name: Peter Nguyen  : 1990  MRN: [id number]  Referring provider: Bhavesh Arroyo MD  Dx:   Encounter Diagnoses   Name Primary?    Cervical radiculopathy Yes    Acute pain of left shoulder                   Subjective: Pt reports symptoms have remained the same this week since decreasing from flare up last week.       Objective: See treatment diary below      Assessment: Cervical testing in seated revealed a positive distraction and compression test. Segmental tightness present at  C5-7 with joint play. Responded well to left sided PA mobilization and experienced significant reduction in symptoms.       Plan: Continue with plan of care to decrease pain, improve mobility, strength, and function.          Precautions standard       Manuals    STM TS parspinals left Pec minor release Pec minor release, TS parspinals left TS parspinals left TS parspinals left   MET   Post rib T7 Post rib T7 Post rib T7   mobs TS PA gr III, CS PA lower cervical gr III  TS PA gr III TS PA gr III TS PA gr III           Neuro Re-Ed         Scap retraction  x10 Hold 5, 2x10   Hold 5, 2x10    Chin tuck Hold 5, 2x10  Hold 5, 2x10                                               Ther Ex        Pec stretch  Door Hold 10, 1x10       Self MET   x5 x5    TS rotation   Hold 5, 2x10   Hold 5, 2x10    TS extension    Hold 5, 2x10     Repeated motions     Flex/ext/retraction                           Ther Activity                        Gait Training                        Modalities                              Insurance:  AMA/CMS Eval/ Re-eval Auth #/ Referral # Total units or visits Start date  Expiration date KX? Visit limitation?  PT only or  PT+OT? Co-Insurance   CMS   2    30 PCY                   POC Start Date POC Expiration Date Signed POC?   3/19/24 4/19/24        Date          Visits/Units:  Used 1 2 3 4 5 6         Authed:   Remaining  29 28 27 26 25 24

## 2024-05-02 ENCOUNTER — OFFICE VISIT (OUTPATIENT)
Dept: PHYSICAL THERAPY | Facility: CLINIC | Age: 34
End: 2024-05-02
Payer: COMMERCIAL

## 2024-05-02 DIAGNOSIS — M54.12 CERVICAL RADICULOPATHY: Primary | ICD-10-CM

## 2024-05-02 PROCEDURE — 97110 THERAPEUTIC EXERCISES: CPT

## 2024-05-02 PROCEDURE — 97140 MANUAL THERAPY 1/> REGIONS: CPT

## 2024-05-14 ENCOUNTER — OFFICE VISIT (OUTPATIENT)
Dept: PHYSICAL THERAPY | Facility: CLINIC | Age: 34
End: 2024-05-14
Payer: COMMERCIAL

## 2024-05-14 DIAGNOSIS — M54.12 CERVICAL RADICULOPATHY: Primary | ICD-10-CM

## 2024-05-14 DIAGNOSIS — M54.16 LUMBAR RADICULOPATHY: ICD-10-CM

## 2024-05-14 DIAGNOSIS — Z98.890 STATUS POST LUMBAR DISCECTOMY: ICD-10-CM

## 2024-05-14 PROCEDURE — 97110 THERAPEUTIC EXERCISES: CPT

## 2024-05-14 PROCEDURE — 97140 MANUAL THERAPY 1/> REGIONS: CPT

## 2024-05-14 NOTE — PROGRESS NOTES
PT Re-Evaluation     Today's date: 2024  Patient name: Peter Nguyen  : 1990  MRN: [id number]  Referring provider: Self  Dx:   Encounter Diagnosis     ICD-10-CM    1. Cervical radiculopathy  M54.12       2. Lumbar radiculopathy  M54.16       3. Status post lumbar discectomy  Z98.890                        Assessment  Assessment details: Peter Nguyen is a 33 y.o. male who has been seen in physical therapy for 7 visits secondary to the diagnosis of Cervical radiculopathy  (primary encounter diagnosis) and is making slow but steady gains towards established goals. The patient has unfortunately experienced a flare up of low back and LE symptoms and would like to resume treatment of that at this time. Lumbar spine evaluated today and presents with limitations in ROM, flexibility, and strength. He would benefit from continued PT services to address impairments outlined in Progress Note and to maximize function/PLOF.     Impairments: abnormal muscle tone, abnormal or restricted ROM, abnormal movement, activity intolerance, impaired physical strength, lacks appropriate home exercise program, pain with function and scapular dyskinesis    Symptom irritability: moderateBarriers to therapy:     Understanding of Dx/Px/POC: good   Prognosis: good    Goals  Short Term Goals (2-4 weeks)  Pt will be independent in basic Home Exercise Program-met  Pt will demonstrate improved postural awareness with no cueing required from PT -ongoing    Long Term Goals (4-6 weeks) ONGOING  Pt will be independent in comprehensive Home Exercise Program -ongoing  Pt will be able to perform all ADLs with pain no more than 2/10-not met  Pt will demonstrate improved FOTO status score by 10 points  Pt will reports a decrease in radicular symptoms of 50%-not met  Pt will be able to stand for 1 hour with no increase in pain           Plan  Patient would benefit from: skilled PT  Referral necessary: No  Planned modality  interventions: cryotherapy and thermotherapy: hydrocollator packs  Planned therapy interventions: ADL retraining, body mechanics training, functional ROM exercises, home exercise program, graded exercise, joint mobilization, manual therapy, massage, neuromuscular re-education, patient education, postural training, strengthening, stretching, therapeutic activities, therapeutic exercise and therapeutic training  Frequency: 2x week  Duration in weeks: 6  Treatment plan discussed with: patient        Subjective Evaluation    History of Present Illness  Mechanism of injury: Pt reports the symptoms in his left shoulder blade remain the same. Continues with an increase in symptoms when sitting with back unsupported or hunched forward. Rates numbness 2/10. States that he has had a significant flare up of low back pain/stiffness and left foot pain/numbness for the past week. Denies any known mechanism of injury but reports he helped his friend push a trailer up a hill and then 2 days later woke up with significant pain. Reports foot pain is the worst is has ever been. Low back and calf feel tight. LBP rated 5/10 and foot pain is 7/10.           Recurrent probem    Patient Goals  Patient goals for therapy: decreased pain and independence with ADLs/IADLs  Patient goal: reach pain free (met)  Pain  Current pain ratin  At best pain ratin  At worst pain ratin  Location: low back  Quality: dull ache and burning  Relieving factors: change in position    Social Support    Employment status: working  Hand dominance: right      Diagnostic Tests  No diagnostic tests performed  Treatments  Previous treatment: injection treatment      Objective     Concurrent Complaints  Positive for headaches. Negative for night pain, disturbed sleep and dizziness    Postural Observations  Seated posture: poor  Standing posture: fair  Correction of posture: makes symptoms better    Additional Postural Observation Details  Forward head,  rounded shoulders    Palpation   Left   Hypertonic in the levator scapulae, pectoralis minor, suboccipitals and upper trapezius.   Tenderness of the pectoralis minor and suboccipitals.     Right   Hypertonic in the pectoralis minor.   Tenderness of the quadratus lumborum.     Additional Palpation Details  Moderate left pec minor TTP  Left piriformis moderate TTP    Cervical/Thoracic Screen   Cervical range of motion within normal limits  Thoracic range of motion within normal limits with the following exceptions: Into extension and rotation    Neurological Testing     Sensation     Shoulder   Left Shoulder   Intact: light touch    Right Shoulder   Intact: Light touch    Active Range of Motion   Cervical/Thoracic Spine       Cervical    Subcranial retraction:   Restriction level: minimal  Flexion:  WFL  Extension:  WFL  Left lateral flexion:  WFL  Right lateral flexion:  WFL  Left rotation:  WFL  Right rotation:  WFL    Thoracic    Left rotation:  Restriction level: minimal  Right rotation:  Restriction level: minimal  Left Shoulder   Flexion: 180 degrees   Abduction: 180 degrees   External rotation 45°: 60 degrees with pain  Internal rotation 45°: 65 degrees     Right Shoulder   Normal active range of motion  Flexion: 180 degrees   Abduction: 180 degrees   External rotation 45°: 80 degrees   Internal rotation 45°: 65 degrees     Lumbar   Flexion:  with pain Restriction level: moderate  Extension:  Restriction level: minimal  Left rotation:  Restriction level: minimal  Right rotation:  Restriction level: minimal    Scapular Mobility   Left Shoulder   Scapular mobility: fair  Scapular Mobility with Shoulder to 90° FF   Upward rotation: inadequate    Joint Play   Left Shoulder  Joints within functional limits are the 1st rib. Hypomobile in the thoracic spine.    Right Shoulder  Joints within functional limits are the 1st rib.     Hypomobile: C5, C6, C7, L4, L5 and S1   Mechanical Assessment    Cervical    Seated  Flexion:  repeated movements  Pain location: no change  Seated Extension: repeated movements  Pain location: no change    Thoracic      Lumbar    Standing flexion: repeated movements   Pain location:peripheralized  Lying extension: repeated movements  Pain level: decreased    Strength/Myotome Testing     Left Shoulder     Planes of Motion   Flexion: 5   Extension: 4   Abduction: 4+   External rotation at 0°: 5   Internal rotation at 0°: 5     Right Shoulder     Planes of Motion   Flexion: 5   Extension: 5   Abduction: 5   External rotation at 0°: 5   Internal rotation at 0°: 5     Tests     Left Shoulder   Negative drop arm, empty can, Hawkin's and painful arc.     Lumbar     Left   Positive passive SLR.     Left Hip   Positive BOBBY and long sit.   Neuro Exam:     Headaches   Patient reports headaches: Yes.   Graphical documentation:              Precautions standard       Manuals 4/30 5/2 5/14 4/23 4/25   STM TS parspinals left TS parspinals left Left piriformis and QL TS parspinals left TS parspinals left   MET   Left post innominate Post rib T7 Post rib T7   mobs TS PA gr III, CS PA lower cervical gr III TS PA gr III, CS PA lower cervical gr III LS PA gr III TS PA gr III TS PA gr III           Neuro Re-Ed         Scap retraction  x10    Hold 5, 2x10    Chin tuck Hold 5, 2x10  Hold 5, 2x10                                               Ther Ex        Pec stretch        Self MET   Right glute, left hip flex x10 x5    TS rotation     Hold 5, 2x10    TS extension    Hold 5, 2x10     Repeated motions   Prone press up 2x10  Flex/ext/retraction   Self PA mob with extension  2x20      Figure 4   Right Hold 10, 1x10              Ther Activity                        Gait Training                        Modalities                              Insurance:  AMA/CMS Eval/ Re-eval Auth #/ Referral # Total units or visits Start date  Expiration date KX? Visit limitation?  PT only or  PT+OT? Co-Insurance   CMS   2    30 PCY                    POC Start Date POC Expiration Date Signed POC?   3/19/24 4/19/24        Date 4/9 4/11 4/18 4/23 4/25 4/30 5/2 5/14       Visits/Units:  Used 1 2 3 4 5 6 7 8       Authed:  Remaining  29 28 27 26 25 24 23 22

## 2024-05-15 ENCOUNTER — APPOINTMENT (OUTPATIENT)
Dept: PHYSICAL THERAPY | Facility: CLINIC | Age: 34
End: 2024-05-15
Payer: COMMERCIAL

## 2024-05-16 ENCOUNTER — OFFICE VISIT (OUTPATIENT)
Dept: PHYSICAL THERAPY | Facility: CLINIC | Age: 34
End: 2024-05-16
Payer: COMMERCIAL

## 2024-05-16 DIAGNOSIS — M54.12 CERVICAL RADICULOPATHY: Primary | ICD-10-CM

## 2024-05-16 DIAGNOSIS — M54.16 LUMBAR RADICULOPATHY: ICD-10-CM

## 2024-05-16 PROCEDURE — 97110 THERAPEUTIC EXERCISES: CPT

## 2024-05-16 PROCEDURE — 97140 MANUAL THERAPY 1/> REGIONS: CPT

## 2024-05-16 NOTE — PROGRESS NOTES
Daily Note     Today's date: 2024  Patient name: Peter Nguyen  : 1990  MRN: [id number]  Referring provider: Bhavesh Arroyo MD  Dx:   Encounter Diagnoses   Name Primary?    Cervical radiculopathy Yes    Lumbar radiculopathy                   Subjective: Pt reports continued tightness and soreness in low back today rated 3/10 and pain in left foot rated 5/10.      Objective: See treatment diary below      Assessment: Pt tolerated treatment well with minimal complaints of pain. Pt presents with continued TTP a bilateral Qls and glue med/piriformis. Symptoms in LBP resolved after treatment but symptoms in foot persist. Pt has a follow up with back surgeon next week.      Plan: Continue with plan of care to decrease pain, improve mobility, strength, and function.          Precautions standard       Manuals    STM TS parspinals left TS parspinals left Left piriformis and QL BL piriformis and QL TS parspinals left   MET   Left post innominate Left post innominate Post rib T7   mobs TS PA gr III, CS PA lower cervical gr III TS PA gr III, CS PA lower cervical gr III LS PA gr III LS PA gr III TS PA gr III           Neuro Re-Ed         Scap retraction  x10        Chin tuck Hold 5, 2x10  Hold 5, 2x10       TA with add    Foam alternating 2x10                                    Ther Ex        Pec stretch        Self MET   Right glute, left hip flex x10     TS rotation     Hold 5, 2x10    TS extension        Repeated motions   Prone press up 2x10 JOAQUIN 2 min Flex/ext/retraction   Self PA mob with extension  2x20      Figure 4   Right Hold 10, 1x10  Left Hold 10, 1x10             Ther Activity                        Gait Training                        Modalities                              Insurance:  AMA/CMS Eval/ Re-eval Auth #/ Referral # Total units or visits Start date  Expiration date KX? Visit limitation?  PT only or  PT+OT? Co-Insurance   CMS   2    30 PCY                    POC Start Date POC Expiration Date Signed POC?   3/19/24 4/19/24        Date 4/9 4/11 4/18 4/23 4/25 4/30 5/2 5/14 5/16      Visits/Units:  Used 1 2 3 4 5 6 7 8 8      Authed:  Remaining  29 28 27 26 25 24 23 22 21

## 2024-05-21 ENCOUNTER — APPOINTMENT (OUTPATIENT)
Dept: PHYSICAL THERAPY | Facility: CLINIC | Age: 34
End: 2024-05-21
Payer: COMMERCIAL

## 2024-05-23 ENCOUNTER — OFFICE VISIT (OUTPATIENT)
Dept: PHYSICAL THERAPY | Facility: CLINIC | Age: 34
End: 2024-05-23
Payer: COMMERCIAL

## 2024-05-23 DIAGNOSIS — Z98.890 STATUS POST LUMBAR DISCECTOMY: ICD-10-CM

## 2024-05-23 DIAGNOSIS — M54.12 CERVICAL RADICULOPATHY: Primary | ICD-10-CM

## 2024-05-23 DIAGNOSIS — M25.512 ACUTE PAIN OF LEFT SHOULDER: ICD-10-CM

## 2024-05-23 DIAGNOSIS — M54.16 LUMBAR RADICULOPATHY: ICD-10-CM

## 2024-05-23 PROCEDURE — 97140 MANUAL THERAPY 1/> REGIONS: CPT | Performed by: PHYSICAL THERAPIST

## 2024-05-23 PROCEDURE — 97110 THERAPEUTIC EXERCISES: CPT | Performed by: PHYSICAL THERAPIST

## 2024-05-23 NOTE — PROGRESS NOTES
Daily Note     Today's date: 2024  Patient name: Peter Nguyen  : 1990  MRN: [id number]  Referring provider: Bhavesh Arroyo MD  Dx:   Encounter Diagnosis     ICD-10-CM    1. Cervical radiculopathy  M54.12       2. Lumbar radiculopathy  M54.16       3. Status post lumbar discectomy  Z98.890       4. Acute pain of left shoulder  M25.512                      Subjective: Patient reports continued numbness in the foot of increased intensity since the incident involving pushing a jet ski.  He reports his back tightness has resolved to this point, though he continues to have pain.      Objective: See treatment diary below      Assessment: Tolerated treatment well. Patient foot numbness symptoms significantly improved to minimal levels post manual intervention to piriformis and unilateral L sided PA glide.  Patient demonstrated fatigue post treatment, exhibited good technique with therapeutic exercises, and would benefit from continued PT      Plan: Continue per plan of care.  Progress treatment as tolerated.  Progress challenge as appropriate with irritability.     Precautions standard       Manuals    STM TS parspinals left TS parspinals left Left piriformis and QL BL piriformis and QL L piriformis w/ hip ER/IR   MET   Left post innominate Left post innominate    mobs TS PA gr III, CS PA lower cervical gr III TS PA gr III, CS PA lower cervical gr III LS PA gr III LS PA gr III LS PA gr III & unilateral L PA gr III           Neuro Re-Ed         Scap retraction  x10        Chin tuck Hold 5, 2x10  Hold 5, 2x10       TA with add    Foam alternating 2x10                                    Ther Ex        Pec stretch        Self MET   Right glute, left hip flex x10     TS rotation        TS extension        Repeated motions   Prone press up 2x10 JOAQUIN 2 min JOAQUIN 2 min   Self PA mob with extension  2x20      Figure 4   Right Hold 10, 1x10  Left Hold 10, 1x10  HEP education            Ther Activity                        Gait Training                        Modalities                              Insurance:  AMA/CMS Eval/ Re-eval Auth #/ Referral # Total units or visits Start date  Expiration date KX? Visit limitation?  PT only or  PT+OT? Co-Insurance   CMS   2    30 PCY                   POC Start Date POC Expiration Date Signed POC?   3/19/24 4/19/24        Date 4/9 4/11 4/18 4/23 4/25 4/30 5/2 5/14 5/16 5/34     Visits/Units:  Used 1 2 3 4 5 6 7 8 8 9     Authed:  Remaining  29 28 27 26 25 24 23 22 21 20

## 2024-05-28 ENCOUNTER — OFFICE VISIT (OUTPATIENT)
Dept: PHYSICAL THERAPY | Facility: CLINIC | Age: 34
End: 2024-05-28
Payer: COMMERCIAL

## 2024-05-28 DIAGNOSIS — M54.12 CERVICAL RADICULOPATHY: Primary | ICD-10-CM

## 2024-05-28 DIAGNOSIS — M54.16 LUMBAR RADICULOPATHY: ICD-10-CM

## 2024-05-28 DIAGNOSIS — Z98.890 STATUS POST LUMBAR DISCECTOMY: ICD-10-CM

## 2024-05-28 PROCEDURE — 97140 MANUAL THERAPY 1/> REGIONS: CPT

## 2024-05-28 NOTE — PROGRESS NOTES
Daily Note     Today's date: 2024  Patient name: Peter Nguyen  : 1990  MRN: [id number]  Referring provider: Bhavesh Arroyo MD  Dx:   Encounter Diagnoses   Name Primary?    Cervical radiculopathy Yes    Lumbar radiculopathy     Status post lumbar discectomy                   Subjective: Pt reports his back has been improving. Minimal tightness. States that his foot pain persists. Has noticed improved symptoms when wearing Crocs. Pain rated 5/10.      Objective: See treatment diary below      Assessment: Pt tolerated treatment well with minimal complaints of pain. No change in symptoms in foot reported with treatment in low back suggesting potential peripheral involvement in foot.       Plan: Continue with plan of care and progress as tolerated.          Precautions standard       Manuals    STM L piriformis w/ hip ER/IR TS parspinals left Left piriformis and QL BL piriformis and QL L piriformis w/ hip ER/IR   MET   Left post innominate Left post innominate    mobs LS PA gr III & unilateral L PA gr III TS PA gr III, CS PA lower cervical gr III LS PA gr III LS PA gr III LS PA gr III & unilateral L PA gr III           Neuro Re-Ed         Scap retraction          Chin tuck  Hold 5, 2x10       TA with add Foam alternating 2x10   Foam alternating 2x10                                    Ther Ex        Pec stretch        Self MET   Right glute, left hip flex x10     TS rotation        TS extension        Repeated motions JOAQUIN 2 min  Prone press up 2x10 JOAQUIN 2 min JOAQUIN 2 min   Self PA mob with extension  2x20      Figure 4   Right Hold 10, 1x10  Left Hold 10, 1x10  HEP education           Ther Activity                        Gait Training                        Modalities                              Insurance:  AMA/CMS Eval/ Re-eval Auth #/ Referral # Total units or visits Start date  Expiration date KX? Visit limitation?  PT only or  PT+OT? Co-Insurance   CMS   2    30 PCY                    POC Start Date POC Expiration Date Signed POC?   3/19/24 4/19/24        Date 4/9 4/11 4/18 4/23 4/25 4/30 5/2 5/14 5/16 5/34 5/28    Visits/Units:  Used 1 2 3 4 5 6 7 8 8 9 10    Authed:  Remaining  29 28 27 26 25 24 23 22 21 20 19

## 2024-05-30 ENCOUNTER — OFFICE VISIT (OUTPATIENT)
Dept: PHYSICAL THERAPY | Facility: CLINIC | Age: 34
End: 2024-05-30
Payer: COMMERCIAL

## 2024-05-30 DIAGNOSIS — Z98.890 STATUS POST LUMBAR DISCECTOMY: ICD-10-CM

## 2024-05-30 DIAGNOSIS — M54.16 LUMBAR RADICULOPATHY: Primary | ICD-10-CM

## 2024-05-30 PROCEDURE — 97110 THERAPEUTIC EXERCISES: CPT

## 2024-05-30 PROCEDURE — 97140 MANUAL THERAPY 1/> REGIONS: CPT

## 2024-05-30 NOTE — PROGRESS NOTES
Daily Note     Today's date: 2024  Patient name: Peter Nguyen  : 1990  MRN: [id number]  Referring provider: Bhavesh Arroyo MD  Dx:   Encounter Diagnoses   Name Primary?    Lumbar radiculopathy Yes    Status post lumbar discectomy                   Subjective: Pt reports continued 5/10 foot pain, back feels tight. Pt had a consult with neurosurgeon and will be undergoing further imaging.       Objective: See treatment diary below      Assessment: Pt tolerated treatment well with minimal complaints of pain. Repeated motion testing in all directions does not improve symptoms in left foot. Further testing revealed +mod bakari testing and significant TTP at left iliopsoas. Pt reports improved symptoms after iliopsoas TP release.       Plan: Continue with plan of care to decrease pain, improve mobility, strength, and function.          Precautions standard       Manuals    STM L piriformis w/ hip ER/IR Left piriformis and QL, left iliopsoas release Left piriformis and QL BL piriformis and QL L piriformis w/ hip ER/IR   MET  Left post innominate Left post innominate Left post innominate    mobs LS PA gr III & unilateral L PA gr III LS PA gr III & unilateral L PA gr III LS PA gr III LS PA gr III LS PA gr III & unilateral L PA gr III           Neuro Re-Ed         Scap retraction          Chin tuck        TA with add Foam alternating 2x10 Foam alternating 2x10  Foam alternating 2x10                                    Ther Ex        Pec stretch        Self MET   Right glute, left hip flex x10     TS rotation        TS extension        Repeated motions JOAQUIN 2 min Testing all directions x20 ea Prone press up 2x10 OJAQUIN 2 min JOAQUIN 2 min   Self PA mob with extension        Figure 4  Left Hold 10, 1x10  Right Hold 10, 1x10  Left Hold 10, 1x10  HEP education           Ther Activity                        Gait Training                        Modalities                               Insurance:  AMA/CMS Eval/ Re-eval Auth #/ Referral # Total units or visits Start date  Expiration date KX? Visit limitation?  PT only or  PT+OT? Co-Insurance   CMS   2    30 PCY                   POC Start Date POC Expiration Date Signed POC?   3/19/24 4/19/24        Date 4/9 4/11 4/18 4/23 4/25 4/30 5/2 5/14 5/16 5/34 5/28 5/30   Visits/Units:  Used 1 2 3 4 5 6 7 8 8 9 10 11   Authed:  Remaining  29 28 27 26 25 24 23 22 21 20 19 18

## 2024-06-03 ENCOUNTER — TELEPHONE (OUTPATIENT)
Age: 34
End: 2024-06-03

## 2024-06-03 ENCOUNTER — APPOINTMENT (OUTPATIENT)
Dept: RADIOLOGY | Facility: CLINIC | Age: 34
End: 2024-06-03
Payer: COMMERCIAL

## 2024-06-03 DIAGNOSIS — M54.12 CERVICAL RADICULOPATHY: ICD-10-CM

## 2024-06-03 PROCEDURE — 72050 X-RAY EXAM NECK SPINE 4/5VWS: CPT

## 2024-06-03 NOTE — TELEPHONE ENCOUNTER
Spoke with patient and he is just going to go to a care now and have his blood work done. Orders are already in the system

## 2024-06-03 NOTE — TELEPHONE ENCOUNTER
Caller: Patient     Doctor: Patt     Reason for call: Patient asking for order script for blood work and asking if he needs x-ray done     Please advise     Call back#: [phone]

## 2024-06-04 ENCOUNTER — OFFICE VISIT (OUTPATIENT)
Dept: PHYSICAL THERAPY | Facility: CLINIC | Age: 34
End: 2024-06-04
Payer: COMMERCIAL

## 2024-06-04 DIAGNOSIS — Z98.890 STATUS POST LUMBAR DISCECTOMY: Primary | ICD-10-CM

## 2024-06-04 DIAGNOSIS — M54.16 LUMBAR RADICULOPATHY: ICD-10-CM

## 2024-06-04 PROCEDURE — 97110 THERAPEUTIC EXERCISES: CPT

## 2024-06-04 PROCEDURE — 97140 MANUAL THERAPY 1/> REGIONS: CPT

## 2024-06-04 NOTE — PROGRESS NOTES
Daily Note     Today's date: 2024  Patient name: Peter Nguyen  : 1990  MRN: [id number]  Referring provider: Bhavesh Arroyo MD  Dx:   Encounter Diagnoses   Name Primary?    Lumbar radiculopathy     Status post lumbar discectomy Yes                  Subjective: Pt reports continued 5/10 pain in left foot.       Objective: See treatment diary below      Assessment: Pt tolerated treatment well with minimal complaints of pain. Pt with increased TTP present in left low back, glute, and hip flexor today. +Mod Tor test on left, added standing hip flexor stretch to HEP.       Plan: Continue with plan of care to decrease pain, improve mobility, strength, and function.          Precautions standard       Manuals    STM L piriformis w/ hip ER/IR Left piriformis and QL, left iliopsoas release Left piriformis and QL, left iliopsoas release BL piriformis and QL L piriformis w/ hip ER/IR   MET  Left post innominate  Left post innominate    mobs LS PA gr III & unilateral L PA gr III LS PA gr III & unilateral L PA gr III LS PA gr III LS PA gr III LS PA gr III & unilateral L PA gr III           Neuro Re-Ed         Scap retraction          Chin tuck        TA with add Foam alternating 2x10 Foam alternating 2x10  Foam alternating 2x10                                    Ther Ex        Glute stretch   30 sec x3     Mod tor   30 sec x3     Standing hip flexor stretch   30 sec x3     TS extension        Repeated motions JOAQUIN 2 min Testing all directions x20 ea Prone press up 2x10 JOAQUIN 2 min JOAQUIN 2 min   Self PA mob with extension        Figure 4  Left Hold 10, 1x10  Right Hold 10, 1x10  Left Hold 10, 1x10  HEP education           Ther Activity                        Gait Training                        Modalities                              Insurance:  AMA/CMS Eval/ Re-eval Auth #/ Referral # Total units or visits Start date  Expiration date KX? Visit limitation?  PT only or  PT+OT?  Co-Insurance   CMS   2    30 PCY                   POC Start Date POC Expiration Date Signed POC?   3/19/24 4/19/24        Date 4/9 4/11 4/18 4/23 4/25 4/30 5/2 5/14 5/16 5/34 5/28 5/30   Visits/Units:  Used 1 2 3 4 5 6 7 8 8 9 10 11   Authed:  Remaining  29 28 27 26 25 24 23 22 21 20 19 18        Date 6/4              Visits/Units:  Used 12              Authed:  Remaining  17

## 2024-06-06 ENCOUNTER — OFFICE VISIT (OUTPATIENT)
Dept: PHYSICAL THERAPY | Facility: CLINIC | Age: 34
End: 2024-06-06
Payer: COMMERCIAL

## 2024-06-06 DIAGNOSIS — Z98.890 STATUS POST LUMBAR DISCECTOMY: ICD-10-CM

## 2024-06-06 DIAGNOSIS — M54.16 LUMBAR RADICULOPATHY: Primary | ICD-10-CM

## 2024-06-06 PROCEDURE — 97140 MANUAL THERAPY 1/> REGIONS: CPT

## 2024-06-06 NOTE — PROGRESS NOTES
Daily Note     Today's date: 2024  Patient name: Peter Nguyen  : 1990  MRN: [id number]  Referring provider: Bhavesh Arroyo MD  Dx:   Encounter Diagnoses   Name Primary?    Lumbar radiculopathy Yes    Status post lumbar discectomy                   Subjective: Pt reports he had a significant increase in pain in left foot today that began this morning and increased to 8/10 during the day.       Objective: See treatment diary below      Assessment: Pt tolerated treatment well with minimal complaints of pain. Pt's symptoms decreased to 3/10 by the end of treatment with maximum pain relief experience with long axis traction of left LE.       Plan: Continue with plan of care and progress as tolerated.          Precautions standard       Manuals    STM L piriformis w/ hip ER/IR Left piriformis and QL, left iliopsoas release Left piriformis and QL, left iliopsoas release BL piriformis and QL, left iliopsoas release L piriformis w/ hip ER/IR   MET  Left post innominate  Left post innominate    mobs LS PA gr III & unilateral L PA gr III LS PA gr III & unilateral L PA gr III LS PA gr III LS PA gr III, long axis distraction left LS PA gr III & unilateral L PA gr III           Neuro Re-Ed         Scap retraction          Chin tuck        TA with add Foam alternating 2x10 Foam alternating 2x10  Foam alternating 2x10                                    Ther Ex        Glute stretch   30 sec x3     Mod bakari   30 sec x3     Standing hip flexor stretch   30 sec x3     TS extension        Repeated motions JOAQUIN 2 min Testing all directions x20 ea Prone press up 2x10 Prone press up 2x10 JOAQUIN 2 min   Self PA mob with extension        Figure 4  Left Hold 10, 1x10  Right Hold 10, 1x10   HEP education           Ther Activity                        Gait Training                        Modalities                              Insurance:  AMA/CMS Eval/ Re-eval Auth #/ Referral # Total units or  visits Start date  Expiration date KX? Visit limitation?  PT only or  PT+OT? Co-Insurance   CMS   2    30 PCY                   POC Start Date POC Expiration Date Signed POC?   3/19/24 4/19/24        Date 4/9 4/11 4/18 4/23 4/25 4/30 5/2 5/14 5/16 5/34 5/28 5/30   Visits/Units:  Used 1 2 3 4 5 6 7 8 8 9 10 11   Authed:  Remaining  29 28 27 26 25 24 23 22 21 20 19 18        Date 6/4 6/6             Visits/Units:  Used 12 13             Authed:  Remaining  17 16

## 2024-06-11 ENCOUNTER — OFFICE VISIT (OUTPATIENT)
Dept: PHYSICAL THERAPY | Facility: CLINIC | Age: 34
End: 2024-06-11
Payer: COMMERCIAL

## 2024-06-11 DIAGNOSIS — Z98.890 STATUS POST LUMBAR DISCECTOMY: ICD-10-CM

## 2024-06-11 DIAGNOSIS — M54.16 LUMBAR RADICULOPATHY: Primary | ICD-10-CM

## 2024-06-11 PROCEDURE — 97140 MANUAL THERAPY 1/> REGIONS: CPT

## 2024-06-11 NOTE — PROGRESS NOTES
Daily Note     Today's date: 2024  Patient name: Peter Nguyen  : 1990  MRN: [id number]  Referring provider: Bhavesh Arroyo MD  Dx:   Encounter Diagnoses   Name Primary?    Lumbar radiculopathy Yes    Status post lumbar discectomy                   Subjective: Pt reports his foot pain is rated at 4/10 today and his back feels achy right now.       Objective: See treatment diary below      Assessment: Pt tolerated treatment well with minimal complaints of pain. Significant TTP present at left piriformis and and right paraspinals. Pt demonstrating improved iliopsoas length. Decreased symptoms in foot to 0/10 post treatment with       Plan: Progress note next visit.          Precautions standard       Manuals    STM L piriformis w/ hip ER/IR Left piriformis and QL, left iliopsoas release Left piriformis and QL, left iliopsoas release BL piriformis and QL, left iliopsoas release BL piriformis and QL, left iliopsoas release   MET  Left post innominate  Left post innominate Left post innominate   mobs LS PA gr III & unilateral L PA gr III LS PA gr III & unilateral L PA gr III LS PA gr III LS PA gr III, long axis distraction left LS PA gr III, long axis distraction left           Neuro Re-Ed         Scap retraction          Chin tuck        TA with add Foam alternating 2x10 Foam alternating 2x10  Foam alternating 2x10 Foam alternating 2x10                                   Ther Ex        Glute stretch   30 sec x3     Mod bakari   30 sec x3     Standing hip flexor stretch   30 sec x3     TS extension        Repeated motions JOAQUIN 2 min Testing all directions x20 ea Prone press up 2x10 Prone press up 2x10    Self PA mob with extension        Figure 4  Left Hold 10, 1x10  Right Hold 10, 1x10              Ther Activity                        Gait Training                        Modalities                              Insurance:  AMA/CMS Eval/ Re-eval Auth #/ Referral # Total units  or visits Start date  Expiration date KX? Visit limitation?  PT only or  PT+OT? Co-Insurance   CMS   2    30 PCY                   POC Start Date POC Expiration Date Signed POC?   3/19/24 4/19/24        Date 4/9 4/11 4/18 4/23 4/25 4/30 5/2 5/14 5/16 5/34 5/28 5/30   Visits/Units:  Used 1 2 3 4 5 6 7 8 8 9 10 11   Authed:  Remaining  29 28 27 26 25 24 23 22 21 20 19 18        Date 6/4 6/6 6/11            Visits/Units:  Used 12 13 14            Authed:  Remaining  17 16 15

## 2024-06-13 ENCOUNTER — OFFICE VISIT (OUTPATIENT)
Dept: PHYSICAL THERAPY | Facility: CLINIC | Age: 34
End: 2024-06-13
Payer: COMMERCIAL

## 2024-06-13 DIAGNOSIS — M54.16 LUMBAR RADICULOPATHY: Primary | ICD-10-CM

## 2024-06-13 DIAGNOSIS — Z98.890 STATUS POST LUMBAR DISCECTOMY: ICD-10-CM

## 2024-06-13 PROCEDURE — 97112 NEUROMUSCULAR REEDUCATION: CPT

## 2024-06-13 PROCEDURE — 97140 MANUAL THERAPY 1/> REGIONS: CPT

## 2024-06-13 NOTE — PROGRESS NOTES
Daily Note     Today's date: 2024  Patient name: Peter Nguyen  : 1990  MRN: [id number]  Referring provider: Bhavesh Arroyo MD  Dx:   Encounter Diagnoses   Name Primary?    Lumbar radiculopathy Yes    Status post lumbar discectomy                   Subjective: Pt reports his foot and low back has been improving. Pain rated 3/10 today.        Objective: See treatment diary below      Assessment: Pt tolerated treatment well with no complaints of pain. Continues to demonstrate improved LE symptoms with iliopsoas release and stretching. Post treatment pain rated 1/10. Pt will be undergoing MRI this weekend and be away next week, then seeing spine surgeon for follow up.       Plan: Progress note next visit.          Precautions standard       Manuals    STM BL piriformis and QL, left iliopsoas release,  Left piriformis and QL, left iliopsoas release Left piriformis and QL, left iliopsoas release BL piriformis and QL, left iliopsoas release BL piriformis and QL, left iliopsoas release   MET Left post innominate Left post innominate  Left post innominate Left post innominate   mobs LS PA gr III, long axis distraction left LS PA gr III & unilateral L PA gr III LS PA gr III LS PA gr III, long axis distraction left LS PA gr III, long axis distraction left           Neuro Re-Ed         Scap retraction          Chin tuck        TA with add Foam alternating 2x10 Foam alternating 2x10  Foam alternating 2x10 Foam alternating 2x10                                   Ther Ex        Glute stretch   30 sec x3     Mod bakari   30 sec x3     Standing hip flexor stretch   30 sec x3     TS extension        Repeated motions  Testing all directions x20 ea Prone press up 2x10 Prone press up 2x10    Self PA mob with extension        Figure 4  Left Hold 10, 1x10  Right Hold 10, 1x10              Ther Activity                        Gait Training                        Modalities                               Insurance:  AMA/CMS Eval/ Re-eval Auth #/ Referral # Total units or visits Start date  Expiration date KX? Visit limitation?  PT only or  PT+OT? Co-Insurance   CMS   2    30 PCY                   POC Start Date POC Expiration Date Signed POC?   3/19/24 4/19/24        Date 4/9 4/11 4/18 4/23 4/25 4/30 5/2 5/14 5/16 5/34 5/28 5/30   Visits/Units:  Used 1 2 3 4 5 6 7 8 8 9 10 11   Authed:  Remaining  29 28 27 26 25 24 23 22 21 20 19 18        Date 6/4 6/6 6/11 6/13           Visits/Units:  Used 12 13 14 15           Authed:  Remaining  17 16 15 14

## 2024-06-15 ENCOUNTER — HOSPITAL ENCOUNTER (OUTPATIENT)
Dept: RADIOLOGY | Facility: HOSPITAL | Age: 34
Discharge: HOME | End: 2024-06-15
Payer: COMMERCIAL

## 2024-06-15 DIAGNOSIS — M51.16 INTERVERTEBRAL DISC DISORDER WITH RADICULOPATHY OF LUMBAR REGION: ICD-10-CM

## 2024-06-15 PROCEDURE — 72158 MRI LUMBAR SPINE W/O & W/DYE: CPT

## 2024-06-15 PROCEDURE — A9585 GADOBUTROL INJECTION: HCPCS | Performed by: RADIOLOGY

## 2024-06-15 RX ORDER — GADOBUTROL 604.72 MG/ML
12 INJECTION INTRAVENOUS
Status: COMPLETED | OUTPATIENT
Start: 2024-06-15 | End: 2024-06-15

## 2024-06-15 RX ADMIN — GADOBUTROL 12 ML: 604.72 INJECTION INTRAVENOUS at 09:44

## 2024-08-09 RX ORDER — IBUPROFEN 800 MG/1
800 TABLET, FILM COATED ORAL EVERY 6 HOURS PRN
COMMUNITY
Start: 2024-06-11

## 2024-08-12 ENCOUNTER — OFFICE VISIT (OUTPATIENT)
Dept: FAMILY MEDICINE CLINIC | Facility: CLINIC | Age: 34
End: 2024-08-12
Payer: COMMERCIAL

## 2024-08-12 VITALS
SYSTOLIC BLOOD PRESSURE: 134 MMHG | HEART RATE: 65 BPM | DIASTOLIC BLOOD PRESSURE: 82 MMHG | HEIGHT: 71 IN | TEMPERATURE: 97.8 F | RESPIRATION RATE: 18 BRPM | BODY MASS INDEX: 35.84 KG/M2 | WEIGHT: 256 LBS | OXYGEN SATURATION: 98 %

## 2024-08-12 DIAGNOSIS — Z13.6 SCREENING FOR HYPERTENSION: ICD-10-CM

## 2024-08-12 DIAGNOSIS — J45.20 MILD INTERMITTENT ASTHMA WITHOUT COMPLICATION: ICD-10-CM

## 2024-08-12 DIAGNOSIS — M54.42 CHRONIC MIDLINE LOW BACK PAIN WITH LEFT-SIDED SCIATICA: ICD-10-CM

## 2024-08-12 DIAGNOSIS — G89.29 CHRONIC MIDLINE LOW BACK PAIN WITH LEFT-SIDED SCIATICA: ICD-10-CM

## 2024-08-12 DIAGNOSIS — Z13.220 SCREENING FOR HYPERLIPIDEMIA: ICD-10-CM

## 2024-08-12 DIAGNOSIS — Z00.00 ROUTINE GENERAL MEDICAL EXAMINATION AT A HEALTH CARE FACILITY: Primary | ICD-10-CM

## 2024-08-12 DIAGNOSIS — Z13.29 SCREENING FOR HYPOTHYROIDISM: ICD-10-CM

## 2024-08-12 DIAGNOSIS — F41.9 ANXIETY: ICD-10-CM

## 2024-08-12 PROCEDURE — 99395 PREV VISIT EST AGE 18-39: CPT | Performed by: FAMILY MEDICINE

## 2024-08-12 RX ORDER — PREDNISONE 20 MG/1
TABLET ORAL
Qty: 14 TABLET | Refills: 0 | Status: SHIPPED | OUTPATIENT
Start: 2024-08-12

## 2024-08-12 NOTE — PROGRESS NOTES
FAMILY PRACTICE HEALTH MAINTENANCE OFFICE VISIT  [facility]    NAME: Peter Nguyen  AGE: 34 y.o. SEX: male  : 1990     DATE: 2024    Assessment and Plan     Problem List Items Addressed This Visit        Respiratory    Mild intermittent asthma without complication       Nervous and Auditory    Chronic midline low back pain with left-sided sciatica    Relevant Medications    predniSONE 20 mg tablet       Behavioral Health    Anxiety   Other Visit Diagnoses     Routine general medical examination at a health care facility    -  Primary    Screening for hypertension        Screening for hyperlipidemia        Screening for hypothyroidism                   Return in about 6 months (around 2025) for Recheck.        Chief Complaint     Chief Complaint   Patient presents with   • Physical Exam       History of Present Illness     DISCUSSED HEALTH ISSUES  REVIEWED MEDICAL RECORD   CONCERNS AT THIS TIME    PERSISTENT BACK ISSUES        Well Adult Physical   Patient here for a comprehensive physical exam.      Diet and Physical Activity  Diet: well balanced diet  Weight concerns: Patient has class 2 obesity (BMI 35.0-39.9)  Exercise: intermittently      Depression Screen  PHQ-2/9 Depression Screening            General Health  Hearing: Normal:  bilateral  Vision: no vision problems  Dental: regular dental visits    Reproductive Health          The following portions of the patient's history were reviewed and updated as appropriate: allergies, current medications, past family history, past medical history, past social history, past surgical history and problem list.    Review of Systems     Review of Systems   Constitutional:  Negative for chills, fatigue and fever.   HENT:  Negative for congestion, ear discharge, ear pain, mouth sores, postnasal drip, sore throat and trouble swallowing.    Eyes:  Negative for pain, discharge and visual  disturbance.   Respiratory:  Negative for cough, shortness of breath and wheezing.    Cardiovascular:  Negative for chest pain, palpitations and leg swelling.   Gastrointestinal:  Negative for abdominal distention, abdominal pain, blood in stool, diarrhea and nausea.   Endocrine: Negative for polydipsia, polyphagia and polyuria.   Genitourinary:  Negative for dysuria, frequency, hematuria and urgency.   Musculoskeletal:  Negative for arthralgias, gait problem and joint swelling.   Skin:  Negative for pallor and rash.   Neurological:  Negative for dizziness, syncope, speech difficulty, weakness, light-headedness, numbness and headaches.   Hematological:  Negative for adenopathy.   Psychiatric/Behavioral:  Negative for behavioral problems, confusion and sleep disturbance. The patient is not nervous/anxious.        Past Medical History     Past Medical History:   Diagnosis Date   • Asthma    • Carpal tunnel syndrome     Right - Last Assessed: 2016    • Exercise-induced asthma     Last Assessed: 2014       Past Surgical History     Past Surgical History:   Procedure Laterality Date   • BACK SURGERY     • HIP SURGERY         Social History     Social History     Socioeconomic History   • Marital status: /Civil Union     Spouse name: None   • Number of children: None   • Years of education: None   • Highest education level: None   Occupational History   • None   Tobacco Use   • Smoking status: Former     Current packs/day: 0.00     Average packs/day: 1 pack/day for 4.0 years (4.0 ttl pk-yrs)     Types: Cigarettes     Start date:      Quit date:      Years since quittin.6     Passive exposure: Past   • Smokeless tobacco: Former   • Tobacco comments:     vaping daily since stopped smoking 3 yrs ago   Vaping Use   • Vaping status: Former   • Substances: Flavoring   Substance and Sexual Activity   • Alcohol use: No   • 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   • Sexual activity:  "None   Other Topics Concern   • None   Social History Narrative    Caffeine use     Dental Care, occasionally     No advance directives     Use of energy drinks      Social Determinants of Health     Financial Resource Strain: Not on file   Food Insecurity: Not on file   Transportation Needs: Not on file   Physical Activity: Not on file   Stress: Not on file   Social Connections: Not on file   Intimate Partner Violence: Not on file   Housing Stability: Not on file       Family History     Family History   Problem Relation Age of Onset   • Other Mother         Lymphedema    • Substance Abuse Family    • No Known Problems Father    • No Known Problems Brother    • Mental illness Neg Hx        Current Medications       Current Outpatient Medications:   •  albuterol (PROVENTIL HFA,VENTOLIN HFA) 90 mcg/act inhaler, Inhale 2 puffs every 4 (four) hours as needed for wheezing, Disp: 8.5 g, Rfl: 1  •  ALPRAZolam (XANAX) 0.25 mg tablet, Take 1 tablet (0.25 mg total) by mouth 2 (two) times a day as needed for anxiety, Disp: 60 tablet, Rfl: 0  •  fluticasone (FLONASE) 50 mcg/act nasal spray, 1 spray into each nostril daily, Disp: , Rfl:   •  ibuprofen (MOTRIN) 800 mg tablet, Take 800 mg by mouth every 6 (six) hours as needed, Disp: , Rfl:   •  predniSONE 20 mg tablet, 2 PO QD X 4 DAYS, THEN 1 PO QD X 4 DAYS, THEN 1/2 PO QD X 4 DAYS, Disp: 14 tablet, Rfl: 0  •  Zubsolv 2.9-0.71 MG SUBL, in the morning, Disp: , Rfl:   No current facility-administered medications for this visit.     Allergies     Allergies   Allergen Reactions   • Molds & Smuts Itching       Objective     /82 (BP Location: Left arm, Patient Position: Sitting, Cuff Size: Large)   Pulse 65   Temp 97.8 °F (36.6 °C) (Temporal)   Resp 18   Ht 5' 10.75\" (1.797 m)   Wt 116 kg (256 lb)   SpO2 98%   BMI 35.96 kg/m²      Physical Exam  Constitutional:       General: He is not in acute distress.     Appearance: Normal appearance. He is well-developed. He is " obese. He is not ill-appearing.   HENT:      Head: Normocephalic and atraumatic.      Right Ear: Tympanic membrane and external ear normal.      Left Ear: Tympanic membrane and external ear normal.      Nose: Nose normal.      Mouth/Throat:      Mouth: Mucous membranes are moist.   Eyes:      General:         Right eye: No discharge.         Left eye: No discharge.      Conjunctiva/sclera: Conjunctivae normal.      Pupils: Pupils are equal, round, and reactive to light.   Neck:      Thyroid: No thyromegaly.      Vascular: No JVD.   Cardiovascular:      Rate and Rhythm: Normal rate and regular rhythm.      Heart sounds: Normal heart sounds. No murmur heard.  Pulmonary:      Effort: Pulmonary effort is normal.      Breath sounds: Normal breath sounds. No wheezing or rales.   Abdominal:      General: Bowel sounds are normal.      Palpations: Abdomen is soft. There is no mass.      Tenderness: There is no abdominal tenderness. There is no guarding or rebound.   Genitourinary:     Penis: Normal.       Testes: Normal.   Musculoskeletal:         General: No tenderness or deformity. Normal range of motion.      Cervical back: Neck supple.   Lymphadenopathy:      Cervical: No cervical adenopathy.   Skin:     General: Skin is warm and dry.      Findings: No erythema or rash.   Neurological:      General: No focal deficit present.      Mental Status: He is alert and oriented to person, place, and time.      Cranial Nerves: No cranial nerve deficit.      Sensory: No sensory deficit.      Motor: No weakness or abnormal muscle tone.      Coordination: Coordination normal.      Gait: Gait normal.      Deep Tendon Reflexes: Reflexes are normal and symmetric. Reflexes normal.   Psychiatric:         Mood and Affect: Mood normal.         Behavior: Behavior normal.         Thought Content: Thought content normal.         Judgment: Judgment normal.           No results found.    Health Maintenance     Health Maintenance   Topic Date Due    • Pneumococcal Vaccine: Pediatrics (0 to 5 Years) and At-Risk Patients (6 to 64 Years) (1 of 2 - PCV) Never done   • DTaP,Tdap,and Td Vaccines (1 - Tdap) Never done   • COVID-19 Vaccine (1 - 2023-24 season) Never done   • PT PLAN OF CARE  06/13/2024   • Influenza Vaccine (1) 09/01/2024   • Depression Screening  04/03/2025   • Annual Physical  08/12/2025   • Zoster Vaccine (1 of 2) 07/26/2041   • RSV Vaccine Age 60+ Years (1 - 1-dose 60+ series) 07/26/2051   • HIV Screening  Completed   • Hepatitis C Screening  Completed   • RSV Vaccine age 0-20 Months  Aged Out   • HIB Vaccine  Aged Out   • IPV Vaccine  Aged Out   • Hepatitis A Vaccine  Aged Out   • Meningococcal ACWY Vaccine  Aged Out   • HPV Vaccine  Aged Out     Immunization History   Administered Date(s) Administered   • Influenza Quadrivalent, 6-35 Months IM 10/11/2017       Bhavesh Arroyo MD  [facility]

## 2024-08-12 NOTE — PATIENT INSTRUCTIONS
DISCUSSED HEALTH MAINTENENCE ISSUES  BW WILL BE REVIEWED  ENCOURAGED HEALTHY DIET AND EXERCISE  RV FOR ANNUAL HEALTH EXAM IN 1 YEAR  RV SOONER IF THERE ARE ANY CONCERNS      RV 6M

## 2024-08-19 ENCOUNTER — EVALUATION (OUTPATIENT)
Dept: PHYSICAL THERAPY | Facility: CLINIC | Age: 34
End: 2024-08-19
Payer: COMMERCIAL

## 2024-08-19 DIAGNOSIS — R26.2 DIFFICULTY WALKING: ICD-10-CM

## 2024-08-19 DIAGNOSIS — M25.551 RIGHT HIP PAIN: Primary | ICD-10-CM

## 2024-08-19 PROCEDURE — 97161 PT EVAL LOW COMPLEX 20 MIN: CPT

## 2024-08-19 PROCEDURE — 97140 MANUAL THERAPY 1/> REGIONS: CPT

## 2024-08-19 NOTE — PROGRESS NOTES
PT Evaluation     Today's date: 2024  Patient name: Peter Nguyen  : 1990  MRN: [id number]  Referring provider: Self  Dx:   Encounter Diagnosis     ICD-10-CM    1. Right hip pain  M25.551       2. Difficulty walking  R26.2                          Assessment  Impairments: abnormal gait, abnormal muscle tone, abnormal or restricted ROM, abnormal movement, activity intolerance, impaired balance, impaired physical strength, lacks appropriate home exercise program, pain with function, weight-bearing intolerance, unable to perform ADL and activity limitations  Symptom irritability: moderate    Assessment details: Peter Nguyen is a 34 y.o. male who presents to physical therapy via direct access evaluation. Presents with pain, decreased LE range of motion, decreased LE strength, decreased lumbar range of motion, impaired function, decreased activity tolerance, and poor body mechanics. Significant spasms and TTP present at right iliopsoas indicating potential strain. As a result, pt presents with functional limitations of ADLs, recreational activities, work-related activities, and ambulation. Pt would benefit from physical therapy services to address these limitations and maximize function. Pt was instructed and educated on home exercise program today and demonstrates understanding.       Barriers to therapy:     Understanding of Dx/Px/POC: good     Prognosis: good    Goals  Short Term Goals (1-2 weeks)  Pt will be independent in basic Home Exercise Program-met  Pt will demonstrate normalized gait  Pt will be able to sit for 30 min with no increase in pain    Long Term Goals (2-4 weeks)   Pt will be independent in comprehensive HEP  Pt will be able to resume work duties with no limitations  Pt will demonstrate proper squat form  Pt will be able to ambulate 30 min with no increase in pain             Plan  Patient would benefit from: skilled PT  Referral necessary: No  Planned modality  interventions: cryotherapy and thermotherapy: hydrocollator packs    Planned therapy interventions: ADL retraining, body mechanics training, functional ROM exercises, home exercise program, graded exercise, joint mobilization, manual therapy, massage, neuromuscular re-education, patient education, postural training, strengthening, stretching, therapeutic activities, therapeutic exercise and therapeutic training    Duration in weeks: 4  Treatment plan discussed with: patient        Subjective Evaluation    History of Present Illness  Mechanism of injury: Pt reports acute right hip pain that started shortly after jet skiing. States that he first noticed a sharp discomfort getting up from a chair. Has been experiencing occasional popping in anterior hip and pain extending to right knee. Pain is most significant getting up from a chair. Unable to lift anything and is on light duty for work. 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 He had an epidural in lumbar spine on . Continues with radicular symptoms in left foot and tightness in low back with prolonged sitting. Pt has a history of right acetabular ORIF in  after an accident.           Recurrent probem    Patient Goals  Patient goals for therapy: decreased pain, increased motion, return to work and independence with ADLs/IADLs    Pain  Current pain ratin  At best pain ratin  At worst pain ratin  Location: right hip  Quality: dull ache, burning, tight and sharp  Relieving factors: change in position  Aggravating factors: sitting, walking and lifting  Progression: worsening    Social Support    Employment status: working  Hand dominance: right      Diagnostic Tests  No diagnostic tests performed      Objective     Concurrent Complaints  Negative for night pain and disturbed sleep    Postural Observations  Seated posture: fair  Standing posture: fair      Palpation     Right   No palpable tenderness to the TFL.    Hypertonic in the iliopsoas and TFL.   Muscle spasm in the iliopsoas.   Tenderness of the iliopsoas.     Additional Palpation Details  Moderate left pec minor TTP    Neurological Testing     Sensation     Shoulder   Left Shoulder   Intact: light touch    Right Shoulder   Intact: Light touch    Active Range of Motion   Left Hip   Flexion: 110 degrees   Extension: 15 degrees   External rotation (90/90): WFL    Right Hip   Flexion: 100 degrees with pain  Extension: -10 degrees with pain  Abduction: with pain  External rotation (90/90): WFL    Joint Play   Left Shoulder  Joints within functional limits are the 1st rib. Hypomobile in the thoracic spine.    Right Shoulder  Joints within functional limits are the 1st rib.   Left Hip   Joints within functional limits are the long axis distraction.     Right Hip     Hypomobile in the long axis distraction    Hypomobile: C5, C6, C7, L4, L5 and S1   Mechanical Assessment    Cervical    Seated Flexion:  repeated movements  Pain location: no change  Seated Extension: repeated movements  Pain location: no change    Thoracic      Lumbar    Standing flexion: repeated movements   Pain location:peripheralized  Lying extension: repeated movements  Pain level: decreased    Strength/Myotome Testing     Left Hip   Planes of Motion   Flexion: 5  Abduction: 4-  Adduction: 5  External rotation: 4+  Internal rotation: 4+    Right Hip   Planes of Motion   Flexion: 3+  Abduction: 4-  Adduction: 5  External rotation: 4-  Internal rotation: 4+    Left Knee   Flexion: 5  Extension: 5    Right Knee   Flexion: 5  Extension: 5    Additional Strength Details  Pain with resisted right hip flexion and ER    Tests     Left Hip   Positive long sit.   Negative BOBBY.   Modified Tor: Negative.   SLR: Negative.     Right Hip   Positive scour.   Negative BOBBY.   Modified Tor: Positive. Hip flexion: 10.   SLR: Negative.     Ambulation     Observational Gait   Gait: antalgic   Decreased right stance  time and left step length.     Quality of Movement During Gait     Hip    Hip (Right): Positive circumducted.     Functional Assessment      Squat    Unable to perform .          Precautions standard       Manuals 8/19       STM Right iliopsoas release       MET        mobs Right hip long axis distraction               Neuro Re-Ed                                                                 Ther Ex        Right hip flexor stretch Standing, Hold 10, x5 gentle                                                               Ther Activity                        Gait Training                        Modalities                              Insurance:  A/CMS Eval/ Re-eval Auth #/ Referral # Total units or visits Start date  Expiration date KX? Visit limitation?  PT only or  PT+OT? Co-Insurance   CMS       30 PCY                   POC Start Date POC Expiration Date Signed POC?   8/19/24 9/18/24        Date 4/9 4/11 4/18 4/23 4/25 4/30 5/2 5/14 5/16 5/34 5/28 5/30   Visits/Units:  Used 1 2 3 4 5 6 7 8 8 9 10 11   Authed:  Remaining  29 28 27 26 25 24 23 22 21 20 19 18        Date 6/4 6/6 6/11 6/13 8/19          Visits/Units:  Used 12 13 14 15 16          Authed:  Remaining  17 16 15 14 13

## 2024-08-22 ENCOUNTER — OFFICE VISIT (OUTPATIENT)
Dept: PHYSICAL THERAPY | Facility: CLINIC | Age: 34
End: 2024-08-22
Payer: COMMERCIAL

## 2024-08-22 ENCOUNTER — APPOINTMENT (OUTPATIENT)
Dept: PHYSICAL THERAPY | Facility: CLINIC | Age: 34
End: 2024-08-22
Payer: COMMERCIAL

## 2024-08-22 DIAGNOSIS — R26.2 DIFFICULTY WALKING: ICD-10-CM

## 2024-08-22 DIAGNOSIS — M25.551 RIGHT HIP PAIN: Primary | ICD-10-CM

## 2024-08-22 PROCEDURE — 97140 MANUAL THERAPY 1/> REGIONS: CPT | Performed by: PHYSICAL THERAPIST

## 2024-08-22 PROCEDURE — 97112 NEUROMUSCULAR REEDUCATION: CPT | Performed by: PHYSICAL THERAPIST

## 2024-08-22 NOTE — PROGRESS NOTES
"Daily Note     Today's date: 2024  Patient name: Peter Nguyen  : 1990  MRN: [id number]  Referring provider: Bhavesh Arroyo MD  Dx:   Encounter Diagnosis     ICD-10-CM    1. Right hip pain  M25.551       2. Difficulty walking  R26.2                      Subjective: Patient reports he has been feeling better since his previous PT session, he has been able to sit for longer stretches with less pain and he has been standing more slowly.      Objective: See treatment diary below      Assessment: Tolerated treatment well. Patient demonstrated fatigue post treatment, exhibited good technique with therapeutic exercises, and would benefit from continued PT      Plan: Continue per plan of care.  Progress treatment as tolerated.  Progress challenge as appropriate with irritability.     Precautions standard       Manuals    STM Hip Flexor, Psoas Release   MET    mobs        Neuro Re-Ed     Scap retraction     Chin tuck    TA with add    Tor Str PT assisted for position and neuromuscular cueing 10x10\"   Standing Hip Flexor Str 3x15\" w/ HEP instruction   Assessment & Management POC with continued slow pace of standing from sitting.  Emphasis on gentle stretching with avoidance of provocative postures.       Ther Ex    Glute stretch    Mod tor    Standing hip flexor stretch    TS extension    Repeated motions    Self PA mob with extension    Figure 4        Ther Activity            Gait Training            Modalities                  Insurance:  AMA/CMS Eval/ Re-eval Auth #/ Referral # Total units or visits Start date  Expiration date KX? Visit limitation?  PT only or  PT+OT? Co-Insurance   CMS   2    30 PCY                   POC Start Date POC Expiration Date Signed POC?   3/19/24 4/19/24        Date  5   Visits/Units:  Used 1 2 3 4 5 6 7 8 8 9 10 11   Authed:  Remaining  29 28 27 26 25 24 23 22 21 20 19 18        Date    "         Visits/Units:  Used 12 13 14 15           Authed:  Remaining  17 16 15 14

## 2024-08-26 ENCOUNTER — OFFICE VISIT (OUTPATIENT)
Dept: PHYSICAL THERAPY | Facility: CLINIC | Age: 34
End: 2024-08-26
Payer: COMMERCIAL

## 2024-08-26 DIAGNOSIS — R26.2 DIFFICULTY WALKING: ICD-10-CM

## 2024-08-26 DIAGNOSIS — M25.551 RIGHT HIP PAIN: Primary | ICD-10-CM

## 2024-08-26 PROCEDURE — 97140 MANUAL THERAPY 1/> REGIONS: CPT | Performed by: PHYSICAL THERAPIST

## 2024-08-26 PROCEDURE — 97112 NEUROMUSCULAR REEDUCATION: CPT | Performed by: PHYSICAL THERAPIST

## 2024-08-27 NOTE — PROGRESS NOTES
"Daily Note     Today's date: 2024  Patient name: Peter Nguyen  : 1990  MRN: [id number]  Referring provider: Bhavesh Arroyo MD  Dx:   Encounter Diagnosis     ICD-10-CM    1. Right hip pain  M25.551       2. Difficulty walking  R26.2                      Subjective: Patient reports he felt an increase in spasm symptoms after his last visit.      Objective: See treatment diary below      Assessment: Tolerated treatment well. Reduced intensity of hip flexor stretch and release due to increase in symptoms after LV, reduced hip flexor tightness post intervention, though adductor symptoms remain.  Patient demonstrated fatigue post treatment, exhibited good technique with therapeutic exercises, and would benefit from continued PT      Plan: Continue per plan of care.  Progress treatment as tolerated.  Progress challenge as appropriate with irritability.     Precautions standard       Manuals    STM Hip flexor, psoas release w/ gentle ER/IR Hip Flexor, Psoas Release   MET     mobs          Neuro Re-Ed      Scap retraction      Chin tuck     Figure 4 5x10\"    Tor Str Leg supported on table no hang w/ TA engagement 10x10\" PT assisted for position and neuromuscular cueing 10x10\"   Standing Hip Flexor Str  3x15\" w/ HEP instruction   Assessment & Management POC to reduce stretch intensity with high repetitions. POC with continued slow pace of standing from sitting.  Emphasis on gentle stretching with avoidance of provocative postures.        Ther Ex     Glute stretch     Mod tor     Standing hip flexor stretch     TS extension     Repeated motions     Self PA mob with extension     Figure 4          Ther Activity               Gait Training               Modalities                     Insurance:  AMA/CMS Eval/ Re-eval Auth #/ Referral # Total units or visits Start date  Expiration date KX? Visit limitation?  PT only or  PT+OT? Co-Insurance   CMS   2    30 PCY                   POC Start " Date POC Expiration Date Signed POC?   3/19/24 4/19/24        Date 4/9 4/11 4/18 4/23 4/25 4/30 5/2 5/14 5/16 5/34 5/28 5/30   Visits/Units:  Used 1 2 3 4 5 6 7 8 8 9 10 11   Authed:  Remaining  29 28 27 26 25 24 23 22 21 20 19 18        Date 6/4 6/6 6/11 6/13           Visits/Units:  Used 12 13 14 15           Authed:  Remaining  17 16 15 14

## 2024-09-01 ENCOUNTER — HOSPITAL ENCOUNTER (OUTPATIENT)
Dept: RADIOLOGY | Facility: HOSPITAL | Age: 34
Discharge: HOME | End: 2024-09-01
Payer: COMMERCIAL

## 2024-09-01 DIAGNOSIS — M24.159 LABRAL TEAR OF HIP, DEGENERATIVE: ICD-10-CM

## 2024-09-01 DIAGNOSIS — T14.8XXA FRACTURE: ICD-10-CM

## 2024-09-01 PROCEDURE — 73721 MRI JNT OF LWR EXTRE W/O DYE: CPT

## 2024-09-14 ENCOUNTER — HOSPITAL ENCOUNTER (OUTPATIENT)
Dept: MRI IMAGING | Facility: HOSPITAL | Age: 34
Discharge: HOME | End: 2024-09-14
Payer: COMMERCIAL

## 2024-09-14 DIAGNOSIS — M51.16 INTERVERTEBRAL DISC DISORDERS WITH RADICULOPATHY, LUMBAR REGION: ICD-10-CM

## 2024-09-14 PROCEDURE — 72158 MRI LUMBAR SPINE W/O & W/DYE: CPT

## 2024-09-14 PROCEDURE — A9585 GADOBUTROL INJECTION: HCPCS | Performed by: FAMILY MEDICINE

## 2024-09-14 RX ORDER — GADOBUTROL 604.72 MG/ML
11 INJECTION INTRAVENOUS
Status: COMPLETED | OUTPATIENT
Start: 2024-09-14 | End: 2024-09-14

## 2024-09-14 RX ADMIN — GADOBUTROL 11 ML: 604.72 INJECTION INTRAVENOUS at 08:53

## 2024-10-14 ENCOUNTER — OFFICE VISIT (OUTPATIENT)
Dept: FAMILY MEDICINE CLINIC | Facility: CLINIC | Age: 34
End: 2024-10-14
Payer: COMMERCIAL

## 2024-10-14 VITALS
BODY MASS INDEX: 35.84 KG/M2 | RESPIRATION RATE: 20 BRPM | WEIGHT: 256 LBS | SYSTOLIC BLOOD PRESSURE: 130 MMHG | TEMPERATURE: 97.3 F | OXYGEN SATURATION: 97 % | HEIGHT: 71 IN | HEART RATE: 88 BPM | DIASTOLIC BLOOD PRESSURE: 90 MMHG

## 2024-10-14 DIAGNOSIS — J02.9 SORE THROAT: Primary | ICD-10-CM

## 2024-10-14 DIAGNOSIS — R19.7 DIARRHEA, UNSPECIFIED TYPE: ICD-10-CM

## 2024-10-14 LAB
SARS-COV-2 AG UPPER RESP QL IA: NEGATIVE
VALID CONTROL: NORMAL

## 2024-10-14 PROCEDURE — 99213 OFFICE O/P EST LOW 20 MIN: CPT | Performed by: FAMILY MEDICINE

## 2024-10-14 PROCEDURE — 96372 THER/PROPH/DIAG INJ SC/IM: CPT | Performed by: FAMILY MEDICINE

## 2024-10-14 PROCEDURE — 87811 SARS-COV-2 COVID19 W/OPTIC: CPT | Performed by: FAMILY MEDICINE

## 2024-10-14 RX ORDER — KETOROLAC TROMETHAMINE 30 MG/ML
60 INJECTION, SOLUTION INTRAMUSCULAR; INTRAVENOUS ONCE
Status: COMPLETED | OUTPATIENT
Start: 2024-10-14 | End: 2024-10-14

## 2024-10-14 RX ORDER — AZITHROMYCIN 250 MG/1
TABLET, FILM COATED ORAL
Qty: 9 TABLET | Refills: 0 | Status: SHIPPED | OUTPATIENT
Start: 2024-10-14 | End: 2024-10-21

## 2024-10-14 RX ORDER — DEXAMETHASONE SODIUM PHOSPHATE 4 MG/ML
4 INJECTION, SOLUTION INTRA-ARTICULAR; INTRALESIONAL; INTRAMUSCULAR; INTRAVENOUS; SOFT TISSUE ONCE
Status: COMPLETED | OUTPATIENT
Start: 2024-10-14 | End: 2024-10-14

## 2024-10-14 RX ADMIN — KETOROLAC TROMETHAMINE 60 MG: 30 INJECTION, SOLUTION INTRAMUSCULAR; INTRAVENOUS at 10:53

## 2024-10-14 RX ADMIN — DEXAMETHASONE SODIUM PHOSPHATE 4 MG: 4 INJECTION, SOLUTION INTRA-ARTICULAR; INTRALESIONAL; INTRAMUSCULAR; INTRAVENOUS; SOFT TISSUE at 10:52

## 2024-10-14 NOTE — PROGRESS NOTES
Ambulatory Visit  Name: Peter Nguyen      : 1990      MRN: [id number]  Encounter Provider: Bhavesh Arroyo MD  Encounter Date: 10/14/2024   Encounter department: [facility]    Assessment & Plan  Sore throat    Orders:    POCT Rapid Covid Ag    dexamethasone (DECADRON) injection 4 mg    ketorolac (TORADOL) 60 mg/2 mL IM injection 60 mg    azithromycin (ZITHROMAX) 250 mg tablet; TAKE 2 TABS PO TODAY, THEN 1 TAB PO QD X 7 DAYS WITH FOOD    Diarrhea, unspecified type    Orders:    POCT Rapid Covid Ag    azithromycin (ZITHROMAX) 250 mg tablet; TAKE 2 TABS PO TODAY, THEN 1 TAB PO QD X 7 DAYS WITH FOOD       History of Present Illness     Sore Throat   This is a new problem. The current episode started in the past 7 days. The problem has been rapidly worsening. There has been no fever. The pain is moderate. Associated symptoms include congestion, coughing, diarrhea, headaches, a hoarse voice and trouble swallowing. Pertinent negatives include no abdominal pain, drooling, ear discharge, ear pain, plugged ear sensation, neck pain, shortness of breath, stridor, swollen glands or vomiting. He has had no exposure to strep or mono. He has tried NSAIDs for the symptoms. The treatment provided mild relief.         Review of Systems   Constitutional:  Negative for chills, fatigue and fever.   HENT:  Positive for congestion, hoarse voice, sore throat and trouble swallowing. Negative for drooling, ear discharge and ear pain.    Eyes:  Negative for discharge.   Respiratory:  Positive for cough. Negative for chest tightness, shortness of breath and stridor.    Cardiovascular:  Negative for chest pain and palpitations.   Gastrointestinal:  Positive for diarrhea. Negative for abdominal pain, nausea and vomiting.   Musculoskeletal:  Negative for arthralgias, gait problem and neck pain.   Neurological:  Positive for headaches. Negative for dizziness and weakness.   Hematological:  Negative  "for adenopathy.   Psychiatric/Behavioral:  The patient is not nervous/anxious.            Objective     /90 (BP Location: Left arm, Patient Position: Sitting, Cuff Size: Large)   Pulse 88   Temp (!) 97.3 °F (36.3 °C) (Temporal)   Resp 20   Ht 5' 10.75\" (1.797 m)   Wt 116 kg (256 lb)   SpO2 97%   BMI 35.96 kg/m²     Physical Exam  Constitutional:       General: He is not in acute distress.     Appearance: He is well-developed. He is not ill-appearing.   HENT:      Head: Normocephalic and atraumatic.      Right Ear: Ear canal and external ear normal. No middle ear effusion. Tympanic membrane is not bulging.      Left Ear: Ear canal and external ear normal.  No middle ear effusion. Tympanic membrane is not bulging.      Nose: Mucosal edema and rhinorrhea present.      Mouth/Throat:      Pharynx: Posterior oropharyngeal erythema present. No oropharyngeal exudate.   Eyes:      General:         Right eye: No discharge.         Left eye: No discharge.      Conjunctiva/sclera:      Right eye: Right conjunctiva is injected.      Left eye: Left conjunctiva is injected.      Pupils: Pupils are equal, round, and reactive to light.   Neck:      Trachea: No tracheal deviation.   Cardiovascular:      Rate and Rhythm: Normal rate and regular rhythm.      Heart sounds: Normal heart sounds. No murmur heard.  Pulmonary:      Effort: Pulmonary effort is normal. No respiratory distress.      Breath sounds: No wheezing or rales.   Abdominal:      General: Bowel sounds are normal. There is no distension.      Palpations: Abdomen is soft. There is no mass.      Tenderness: There is no abdominal tenderness. There is no guarding or rebound.   Musculoskeletal:         General: Normal range of motion.      Cervical back: Normal range of motion and neck supple.   Lymphadenopathy:      Cervical: No cervical adenopathy.   Skin:     General: Skin is warm and dry.      Findings: No rash.   Neurological:      General: No focal deficit " present.      Mental Status: He is alert and oriented to person, place, and time.   Psychiatric:         Mood and Affect: Mood normal.         Behavior: Behavior normal.         Thought Content: Thought content normal.         Judgment: Judgment normal.

## 2024-10-14 NOTE — PATIENT INSTRUCTIONS
PLENTY FLUIDS  REST  MUCINEX  MEDICATION AS DIRECTED  IF SYMPTOMS PERSIST OR WORSEN, PLEASE CALL    CALL WEDNESDAY WITH UPDATE

## 2024-12-02 ENCOUNTER — TELEPHONE (OUTPATIENT)
Age: 34
End: 2024-12-02

## 2024-12-02 NOTE — TELEPHONE ENCOUNTER
I called patient LVM to call me back to schedule a sooner appointment with Dr. Montes in [place] office.

## 2024-12-17 NOTE — PROGRESS NOTES
ASSESSMENT:  (J01.00) Acute non-recurrent maxillary sinusitis  (primary encounter diagnosis)  Plan: doxycycline hyclate (VIBRA-TABS) 100 MG tablet    (R07.0) Throat pain  Plan: Streptococcus A Rapid Screen w/Reflex to PCR -         Clinic Collect, Group A Streptococcus PCR         Throat Swab    (R51.9) Acute nonintractable headache, unspecified headache type  Plan: ketorolac (TORADOL) injection 30 mg    PLAN:  Informed the patient of the strep test is negative.  Intramuscular injection of Toradol provided for the patient in the clinic today given her headache and her past medical history that includes resolution of her headache and sinus pain/pressure after Toradol injections.  We discussed avoiding taking aspirin, naproxen and ibuprofen the remainder of today as Toradol is in the same medication class as these medications.  Acute sinusitis patient instructions discussed and provided.  We discussed the need to take the antibiotic as prescribed and finish the full course even if symptoms improve.  We also discussed trying yogurt with active cultures or probiotic such as Culturelle daily to help find diarrhea while taking the antibiotic.  Informed the patient to use nasal saline spray and/or humidifier/steam for the nasal symptoms.  Discussed the need to follow-up with their primary care provider or ENT should symptoms persist.  Patient acknowledged their understanding of the above plan.    The use of Dragon/[employer] dictation services may have been used to construct the content in this note; any grammatical or spelling errors are non-intentional. Please contact the author of this note directly if you are in need of any clarification.      NARCISA Juarez CNP      SUBJECTIVE:   Anh Flores is a 32 year old female presenting with a chief complaint of runny nose, stuffy nose, and sinus pain/pressure for over the past week.  She also reports a sore throat starting last Thursday.   Treatment measures  Assessment/Plan:    No problem-specific Assessment & Plan notes found for this encounter  Diagnoses and all orders for this visit:    Anxiety  -     ALPRAZolam (XANAX) 0 25 mg tablet; Take 1 tablet (0 25 mg total) by mouth 3 (three) times a day as needed for anxiety    Panic disorder without agoraphobia    Other orders  -     VENTOLIN  (90 Base) MCG/ACT inhaler; Inhale 2 puffs 4 (four) times a day As directed  -     Discontinue: ALPRAZolam (XANAX) 0 25 mg tablet; Take 1 tablet by mouth 3 (three) times a day as needed  -     ZUBSOLV 5 7-1 4 MG SUBL;   -     fluticasone (FLONASE) 50 mcg/act nasal spray; 1 spray into each nostril daily  -     venlafaxine (EFFEXOR-XR) 37 5 mg 24 hr capsule; Take 1 capsule by mouth Daily          Subjective:      Patient ID: Korey Salazar is a 33 y o  male      PATIENT RETURNS  HAS BEEN FEELING GOOD  NO PANIC ATTACKS SINCE LAST VISIT  TAKES 1 XANAX DAILY  FEELS WELL    REVIEWED RISKS AND PRECAUTIONS OF MEDICATION USE  ENCOURAGED PATIENT TO WEAN OF SUBOXONE        The following portions of the patient's history were reviewed and updated as appropriate: allergies, current medications, past family history, past medical history, past social history, past surgical history and problem list     Review of Systems      Objective:      /82 (BP Location: Left arm, Patient Position: Sitting, Cuff Size: Extra-Large)   Pulse 82   Temp 97 6 °F (36 4 °C) (Temporal)   Resp 16   Ht 5' 10" (1 778 m)   Wt 122 kg (270 lb)   BMI 38 74 kg/m²          Physical Exam      LENGTH OF VISIT - 20 MIN  LENGTH OF  20 MIN tried include None tried.  Predisposing factors include previous history of recurrent sinus infections.  The patient does report that when she has a headache from her sinus pain/pressure she will typically receive an intramuscular injection of Toradol that we will relieve her pain.    ROS:  Negative except noted above.    OBJECTIVE:  /84 (BP Location: Left arm, Patient Position: Sitting, Cuff Size: Adult Large)   Pulse 75   Temp 98.7  F (37.1  C) (Oral)   Resp 16   Wt 117.8 kg (259 lb 12.8 oz)   LMP 10/16/2024 (Approximate)   SpO2 96%   BMI 49.90 kg/m    GENERAL APPEARANCE: healthy, alert and no distress  EYES: EOMI,  PERRL, conjunctiva clear  HENT: TM's normal bilaterally, nasal turbinates erythematous, swollen, and left sided maxillary sinus tenderness   NECK: supple, nontender, no lymphadenopathy  RESP: lungs clear to auscultation - no rales, rhonchi or wheezes  CV: regular rates and rhythm, normal S1 S2, no murmur noted  SKIN: no suspicious lesions or rashes    Rapid Strep test: Negative

## 2024-12-20 ENCOUNTER — HOSPITAL ENCOUNTER (OUTPATIENT)
Dept: RADIOLOGY | Facility: HOSPITAL | Age: 34
Discharge: HOME | End: 2024-12-20
Payer: COMMERCIAL

## 2024-12-20 DIAGNOSIS — M25.551 RIGHT HIP PAIN: ICD-10-CM

## 2024-12-20 PROCEDURE — 73721 MRI JNT OF LWR EXTRE W/O DYE: CPT

## 2025-01-23 ENCOUNTER — CONSULT (OUTPATIENT)
Dept: FAMILY MEDICINE CLINIC | Facility: CLINIC | Age: 35
End: 2025-01-23
Payer: COMMERCIAL

## 2025-01-23 VITALS
SYSTOLIC BLOOD PRESSURE: 130 MMHG | TEMPERATURE: 97.3 F | OXYGEN SATURATION: 97 % | HEART RATE: 96 BPM | HEIGHT: 71 IN | BODY MASS INDEX: 37.38 KG/M2 | DIASTOLIC BLOOD PRESSURE: 70 MMHG | RESPIRATION RATE: 16 BRPM | WEIGHT: 267 LBS

## 2025-01-23 DIAGNOSIS — M54.42 CHRONIC MIDLINE LOW BACK PAIN WITH LEFT-SIDED SCIATICA: Primary | ICD-10-CM

## 2025-01-23 DIAGNOSIS — M51.16 INTERVERTEBRAL DISC DISORDERS WITH RADICULOPATHY, LUMBAR REGION: ICD-10-CM

## 2025-01-23 DIAGNOSIS — G89.29 CHRONIC MIDLINE LOW BACK PAIN WITH LEFT-SIDED SCIATICA: Primary | ICD-10-CM

## 2025-01-23 PROCEDURE — 99213 OFFICE O/P EST LOW 20 MIN: CPT | Performed by: STUDENT IN AN ORGANIZED HEALTH CARE EDUCATION/TRAINING PROGRAM

## 2025-01-23 NOTE — PATIENT INSTRUCTIONS
Pre-operative Medication Instructions    Avoid herbs or non-directed vitamins one week prior to surgery  Avoid aspirin containing medications or non-steroidal anti-inflammatory drugs one week preceding surgery  May take tylenol for pain up until the night before surgery    Benzodiazepine Antagonist     Medication Name     ALPRAZolam (XANAX) 0.25 mg tablet      If this medication is needed please continue to take on your normal schedule.  If you take it in the morning, then you may take this medicine with a sip of water.    Buprenorphine     Medication Name     Zubsolv 2.9-0.71 MG SUBL      Continue to take this medication on your normal schedule.  If this is an oral medication and you take it in the morning, then you may take this medicine with a sip of water.    Opioid Medications     Medication Name     Zubsolv 2.9-0.71 MG SUBL      Continue to take this medication on your normal schedule.  If this is an oral medication and you take it in the morning, then you may take this medicine with a sip of water.

## 2025-01-23 NOTE — PROGRESS NOTES
Pre-operative Clearance  Name: Peter Nguyen      : 1990      MRN: [id number]  Encounter Provider: Gris Huntley MD  Encounter Date: 2025   Encounter department: [facility]    Assessment & Plan  Chronic midline low back pain with left-sided sciatica  -Continue flexeril 10 mg TID PRN       Intervertebral disc disorders with radiculopathy, lumbar region  -Patient scheduled for spinal surgery on 2025 with Dr. Contreras       Pre-operative Clearance:     Revised Cardiac Risk Index:  RCI RISK CLASS I (0 risk factors, risk of major cardiac complications approximately 0.5%)    Clearance:  Patient is medically optimized (CLEARED) for proposed surgery without any additional cardiac testing.       EKG and labwork reviewed    Medication Instructions:   - Avoid herbs or non-directed vitamins one week prior to surgery    - Avoid aspirin containing medications or non-steroidal anti-inflammatory drugs one week preceding surgery    - May take tylenol for pain up until the night before surgery    - Benzodiazepines (ie, alprazolam, lorazepam, diazepam):  If the medication is needed, continue to take it on your normal schedule.  - Buprenorphine: Continue to take this medication on your normal schedule.  - Opioids: Continue to take this medication on your normal schedule.      Depression Screening and Follow-up Plan: Patient was screened for depression during today's encounter. They screened negative with a PHQ-2 score of 0.      History of Present Illness     Pre-op Exam    Pre-operative Risk Factors:    History of cerebrovascular disease: No    History of ischemic heart disease: No  Pre-operative treatment with insulin: No  Pre-operative creatinine >2 mg/dL: No    History of congestive heart failure: No    Review of Systems   Constitutional:  Negative for activity change, appetite change, chills, fatigue and fever.   HENT:  Negative for congestion.    Respiratory:  Negative for  cough, shortness of breath and wheezing.    Cardiovascular:  Negative for chest pain, palpitations and leg swelling.   Gastrointestinal:  Negative for abdominal pain, constipation, diarrhea, nausea and vomiting.   Skin:  Negative for rash.   Neurological:  Negative for light-headedness and headaches.   Psychiatric/Behavioral:  The patient is not nervous/anxious.      Past Medical History   Past Medical History:   Diagnosis Date   • Asthma    • Carpal tunnel syndrome     Right - Last Assessed: 2016    • Exercise-induced asthma     Last Assessed: 2014     Past Surgical History:   Procedure Laterality Date   • BACK SURGERY     • HIP SURGERY       Family History   Problem Relation Age of Onset   • Other Mother         Lymphedema    • Substance Abuse Family    • No Known Problems Father    • No Known Problems Brother    • Mental illness Neg Hx      Social History     Tobacco Use   • Smoking status: Former     Current packs/day: 0.00     Average packs/day: 1 pack/day for 4.0 years (4.0 ttl pk-yrs)     Types: Cigarettes     Start date:      Quit date:      Years since quittin.0     Passive exposure: Past   • Smokeless tobacco: Former   • Tobacco comments:     vaping daily since stopped smoking 3 yrs ago   Vaping Use   • Vaping status: Former   • Start date: 6/15/2014   • Quit date: 2019   • Substances: Flavoring   Substance and Sexual Activity   • Alcohol use: No   • 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   • Sexual activity: Not on file     Current Outpatient Medications on File Prior to Visit   Medication Sig   • albuterol (PROVENTIL HFA,VENTOLIN HFA) 90 mcg/act inhaler Inhale 2 puffs every 4 (four) hours as needed for wheezing   • ALPRAZolam (XANAX) 0.25 mg tablet Take 1 tablet (0.25 mg total) by mouth 2 (two) times a day as needed for anxiety   • cyclobenzaprine (FLEXERIL) 10 mg tablet Take 10 mg by mouth 3 (three) times a day as needed   • fluticasone (FLONASE) 50 mcg/act  "nasal spray 1 spray into each nostril daily   • ibuprofen (MOTRIN) 800 mg tablet Take 800 mg by mouth every 6 (six) hours as needed   • Multiple Vitamins-Minerals (Emergen-C Immune) PACK Take 1 each by mouth   • Zubsolv 2.9-0.71 MG SUBL in the morning     Allergies   Allergen Reactions   • Molds & Smuts Itching     Objective   /70   Pulse 96   Temp (!) 97.3 °F (36.3 °C)   Resp 16   Ht 5' 10.75\" (1.797 m)   Wt 121 kg (267 lb)   SpO2 97%   BMI 37.50 kg/m²     Physical Exam  Constitutional:       Appearance: Normal appearance.   HENT:      Head: Normocephalic and atraumatic.   Cardiovascular:      Rate and Rhythm: Normal rate and regular rhythm.      Pulses: Normal pulses.      Heart sounds: Normal heart sounds.   Pulmonary:      Effort: Pulmonary effort is normal.      Breath sounds: Normal breath sounds.   Musculoskeletal:      Lumbar back: Tenderness present. No spasms. Decreased range of motion. Positive left straight leg raise test.   Neurological:      General: No focal deficit present.      Mental Status: He is alert and oriented to person, place, and time.   Psychiatric:         Mood and Affect: Mood normal.         Behavior: Behavior normal.         Thought Content: Thought content normal.         Judgment: Judgment normal.           Gris Huntley MD  "

## 2025-01-28 ENCOUNTER — TELEPHONE (OUTPATIENT)
Age: 35
End: 2025-01-28

## 2025-01-28 NOTE — TELEPHONE ENCOUNTER
Patient's surgery clearance for surgery on 2/13 for lumbar at [facility] needs to be faxed to [phone].     Please take care of at earliest convenience. Consult was on 1/23.     Thank you.

## 2025-01-28 NOTE — TELEPHONE ENCOUNTER
Left message for patient reminding them of upcoming appointment with Jose Maria, new office address and phone number.

## 2025-03-20 ENCOUNTER — OFFICE VISIT (OUTPATIENT)
Dept: FAMILY MEDICINE CLINIC | Facility: CLINIC | Age: 35
End: 2025-03-20
Payer: COMMERCIAL

## 2025-03-20 VITALS
HEART RATE: 84 BPM | BODY MASS INDEX: 37.21 KG/M2 | HEIGHT: 71 IN | RESPIRATION RATE: 18 BRPM | SYSTOLIC BLOOD PRESSURE: 128 MMHG | DIASTOLIC BLOOD PRESSURE: 70 MMHG | TEMPERATURE: 96.9 F | OXYGEN SATURATION: 95 % | WEIGHT: 265.8 LBS

## 2025-03-20 DIAGNOSIS — G89.29 CHRONIC MIDLINE LOW BACK PAIN WITH LEFT-SIDED SCIATICA: ICD-10-CM

## 2025-03-20 DIAGNOSIS — M54.42 CHRONIC MIDLINE LOW BACK PAIN WITH LEFT-SIDED SCIATICA: ICD-10-CM

## 2025-03-20 DIAGNOSIS — J45.20 MILD INTERMITTENT ASTHMA WITHOUT COMPLICATION: Primary | ICD-10-CM

## 2025-03-20 DIAGNOSIS — F41.9 ANXIETY: ICD-10-CM

## 2025-03-20 PROCEDURE — 99214 OFFICE O/P EST MOD 30 MIN: CPT | Performed by: FAMILY MEDICINE

## 2025-03-20 RX ORDER — CELECOXIB 200 MG/1
200 CAPSULE ORAL 2 TIMES DAILY
Qty: 60 CAPSULE | Refills: 1 | Status: SHIPPED | OUTPATIENT
Start: 2025-03-20

## 2025-03-20 NOTE — PROGRESS NOTES
Name: Peter Nguyen      : 1990      MRN: [id number]  Encounter Provider: Bhavesh Arroyo MD  Encounter Date: 3/20/2025   Encounter department: [facility]    Assessment & Plan  Mild intermittent asthma without complication  STABLE       Anxiety  STABLE       Chronic midline low back pain with left-sided sciatica    Orders:  •  celecoxib (CeleBREX) 200 mg capsule; Take 1 capsule (200 mg total) by mouth 2 (two) times a day WITH FOOD         History of Present Illness     PATIENT RETURNS FOR ROUTINE EVALUATION OF PATIENT'S MEDICAL ISSUES    INDIVIDUAL MEDICAL ISSUES WITH THEIR CURRENT STATUS, ASSESSMENT AND PLANS ARE LISTED ABOVE            Review of Systems   Constitutional:  Negative for chills, fatigue and fever.   HENT:  Negative for congestion, ear discharge, ear pain, mouth sores, postnasal drip, sore throat and trouble swallowing.    Eyes:  Negative for pain, discharge and visual disturbance.   Respiratory:  Negative for cough, shortness of breath and wheezing.    Cardiovascular:  Negative for chest pain, palpitations and leg swelling.   Gastrointestinal:  Negative for abdominal distention, abdominal pain, blood in stool, diarrhea and nausea.   Endocrine: Negative for polydipsia, polyphagia and polyuria.   Genitourinary:  Negative for dysuria, frequency, hematuria and urgency.   Musculoskeletal:  Negative for arthralgias, gait problem and joint swelling.   Skin:  Negative for pallor and rash.   Neurological:  Negative for dizziness, syncope, speech difficulty, weakness, light-headedness, numbness and headaches.   Hematological:  Negative for adenopathy.   Psychiatric/Behavioral:  Negative for behavioral problems, confusion and sleep disturbance. The patient is not nervous/anxious.      Past Medical History:   Diagnosis Date   • Asthma    • Carpal tunnel syndrome     Right - Last Assessed: 2016    • Exercise-induced asthma     Last Assessed:        Past Surgical History:   Procedure Laterality Date   • BACK SURGERY     • HIP SURGERY       Family History   Problem Relation Age of Onset   • Other Mother         Lymphedema    • Substance Abuse Family    • No Known Problems Father    • No Known Problems Brother    • Mental illness Neg Hx      Social History     Tobacco Use   • Smoking status: Former     Current packs/day: 0.00     Average packs/day: 1 pack/day for 4.0 years (4.0 ttl pk-yrs)     Types: Cigarettes     Start date:      Quit date: 2013     Years since quittin.2     Passive exposure: Past   • Smokeless tobacco: Former   • Tobacco comments:     vaping daily since stopped smoking 3 yrs ago   Vaping Use   • Vaping status: Former   • Start date: 6/15/2014   • Quit date: 2019   • Substances: Flavoring   Substance and Sexual Activity   • Alcohol use: No   • 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   • Sexual activity: Not on file     Current Outpatient Medications on File Prior to Visit   Medication Sig   • albuterol (PROVENTIL HFA,VENTOLIN HFA) 90 mcg/act inhaler Inhale 2 puffs every 4 (four) hours as needed for wheezing   • ALPRAZolam (XANAX) 0.25 mg tablet Take 1 tablet (0.25 mg total) by mouth 2 (two) times a day as needed for anxiety   • fluticasone (FLONASE) 50 mcg/act nasal spray 1 spray into each nostril daily   • ibuprofen (MOTRIN) 800 mg tablet Take 800 mg by mouth every 6 (six) hours as needed   • Zubsolv 2.9-0.71 MG SUBL in the morning   • naloxone (NARCAN) 4 mg/0.1 mL nasal spray 4 mg into each nostril (Patient not taking: Reported on 3/20/2025)   • [DISCONTINUED] cyclobenzaprine (FLEXERIL) 10 mg tablet Take 10 mg by mouth 3 (three) times a day as needed (Patient not taking: Reported on 3/20/2025)   • [DISCONTINUED] Multiple Vitamins-Minerals (Emergen-C Immune) PACK Take 1 each by mouth (Patient not taking: Reported on 3/20/2025)   • [DISCONTINUED] oxyCODONE (ROXICODONE) 5 immediate release tablet Take 5 mg by mouth  "every 4 (four) hours as needed (Patient not taking: Reported on 3/20/2025)   • [DISCONTINUED] tiZANidine (ZANAFLEX) 2 mg tablet Take 1 tablet by mouth every 8 (eight) hours as needed (Patient not taking: Reported on 3/20/2025)     Allergies   Allergen Reactions   • Molds & Smuts Itching     Immunization History   Administered Date(s) Administered   • DTP [birth year], 02/28/1992, 07/28/1993, 07/02/1996   • Hep B, Adolescent or Pediatric 01/11/1993, 02/11/1993, 07/28/1993   • Hib (HbOC) [birth year], 02/28/1992, 01/11/1993   • Influenza Quadrivalent, 6-35 Months IM 10/11/2017   • Influenza, seasonal, injectable 03/14/1995   • MMR 01/11/1993, 07/02/1996   • OPV [birth year], 02/28/1992, 07/28/1993, 07/02/1996   • Td (adult), adsorbed 12/28/2005     Objective   /70   Pulse 84   Temp (!) 96.9 °F (36.1 °C)   Resp 18   Ht 5' 10.75\" (1.797 m)   Wt 121 kg (265 lb 12.8 oz)   SpO2 95%   BMI 37.33 kg/m²     Physical Exam  Vitals reviewed.   Constitutional:       General: He is not in acute distress.     Appearance: Normal appearance. He is well-developed. He is not ill-appearing.   HENT:      Head: Normocephalic and atraumatic.      Nose: Nose normal.      Mouth/Throat:      Mouth: Mucous membranes are moist.   Eyes:      General:         Right eye: No discharge.         Left eye: No discharge.      Conjunctiva/sclera: Conjunctivae normal.      Pupils: Pupils are equal, round, and reactive to light.   Neck:      Thyroid: No thyromegaly.      Vascular: No JVD.   Cardiovascular:      Rate and Rhythm: Normal rate and regular rhythm.      Heart sounds: Normal heart sounds. No murmur heard.  Pulmonary:      Effort: Pulmonary effort is normal.      Breath sounds: Normal breath sounds. No wheezing or rales.   Abdominal:      General: Bowel sounds are normal.      Palpations: Abdomen is soft. There is no mass.      Tenderness: There is no abdominal tenderness. There is no guarding or rebound. "   Musculoskeletal:         General: Tenderness present. No deformity. Normal range of motion.      Cervical back: Neck supple.      Comments: MODERATE LOWER LUMBAR SPINAL TENDERNESS   Lymphadenopathy:      Cervical: No cervical adenopathy.   Skin:     General: Skin is warm and dry.      Findings: No erythema or rash.   Neurological:      General: No focal deficit present.      Mental Status: He is alert and oriented to person, place, and time.   Psychiatric:         Mood and Affect: Mood normal.         Behavior: Behavior normal.         Thought Content: Thought content normal.         Judgment: Judgment normal.

## 2025-03-20 NOTE — ASSESSMENT & PLAN NOTE
Orders:  •  celecoxib (CeleBREX) 200 mg capsule; Take 1 capsule (200 mg total) by mouth 2 (two) times a day WITH FOOD

## 2025-04-08 ENCOUNTER — TELEPHONE (OUTPATIENT)
Dept: FAMILY MEDICINE CLINIC | Facility: CLINIC | Age: 35
End: 2025-04-08

## 2025-05-31 ENCOUNTER — HOSPITAL ENCOUNTER (OUTPATIENT)
Dept: RADIOLOGY | Facility: HOSPITAL | Age: 35
Discharge: HOME | End: 2025-05-31
Attending: NEUROLOGICAL SURGERY
Payer: COMMERCIAL

## 2025-05-31 DIAGNOSIS — M25.551 RIGHT HIP PAIN: ICD-10-CM

## 2025-05-31 DIAGNOSIS — M51.16 NEURITIS OR RADICULITIS DUE TO RUPTURE OF LUMBAR INTERVERTEBRAL DISC: ICD-10-CM

## 2025-05-31 PROCEDURE — 72158 MRI LUMBAR SPINE W/O & W/DYE: CPT

## 2025-05-31 PROCEDURE — A9585 GADOBUTROL INJECTION: HCPCS | Performed by: RADIOLOGY

## 2025-05-31 RX ORDER — GADOBUTROL 604.72 MG/ML
13 INJECTION INTRAVENOUS
Status: COMPLETED | OUTPATIENT
Start: 2025-05-31 | End: 2025-05-31

## 2025-05-31 RX ADMIN — GADOBUTROL 13 ML: 604.72 INJECTION INTRAVENOUS at 09:21

## 2025-06-04 RX ORDER — GABAPENTIN 100 MG/1
100 CAPSULE ORAL 3 TIMES DAILY
COMMUNITY
Start: 2025-03-26 | End: 2025-06-05 | Stop reason: HOSPADM

## 2025-06-05 ENCOUNTER — CONSULT (OUTPATIENT)
Dept: FAMILY MEDICINE CLINIC | Facility: CLINIC | Age: 35
End: 2025-06-05
Payer: COMMERCIAL

## 2025-06-05 VITALS
BODY MASS INDEX: 36.68 KG/M2 | SYSTOLIC BLOOD PRESSURE: 138 MMHG | RESPIRATION RATE: 18 BRPM | TEMPERATURE: 97.2 F | HEIGHT: 71 IN | OXYGEN SATURATION: 99 % | DIASTOLIC BLOOD PRESSURE: 88 MMHG | HEART RATE: 88 BPM | WEIGHT: 262 LBS

## 2025-06-05 DIAGNOSIS — M25.551 RIGHT HIP PAIN: Primary | ICD-10-CM

## 2025-06-05 DIAGNOSIS — Z01.818 PRE-OP EVALUATION: ICD-10-CM

## 2025-06-05 PROBLEM — M25.559 PAIN IN JOINT INVOLVING PELVIC REGION AND THIGH: Status: ACTIVE | Noted: 2025-06-05

## 2025-06-05 PROCEDURE — 99213 OFFICE O/P EST LOW 20 MIN: CPT | Performed by: STUDENT IN AN ORGANIZED HEALTH CARE EDUCATION/TRAINING PROGRAM

## 2025-06-05 PROCEDURE — 93000 ELECTROCARDIOGRAM COMPLETE: CPT | Performed by: STUDENT IN AN ORGANIZED HEALTH CARE EDUCATION/TRAINING PROGRAM

## 2025-06-05 NOTE — PROGRESS NOTES
Pre-operative Clearance  Name: Peter Nguyen      : 1990      MRN: [id number]  Encounter Provider: Gris Huntley MD  Encounter Date: 2025   Encounter department: [facility]    :  Assessment & Plan  Right hip pain  -Right hip surgery scheduled on 25  Orders:  •  POCT ECG    Pre-op evaluation    Orders:  •  POCT ECG        Pre-operative Clearance:     Revised Cardiac Risk Index:  RCI RISK CLASS I (0 risk factors, risk of major cardiac complications approximately 0.5%)    Clearance:  Patient is medically optimized (CLEARED) for proposed surgery without any additional cardiac testing.      Medication Instructions:   - Avoid herbs or non-directed vitamins one week prior to surgery    - Avoid aspirin containing medications or non-steroidal anti-inflammatory drugs one week preceding surgery    - May take tylenol for pain up until the night before surgery    - Benzodiazepines (ie, alprazolam, lorazepam, diazepam):  If the medication is needed, continue to take it on your normal schedule.  - Buprenorphine: Continue to take this medication on your normal schedule.  - Opioids: Continue to take this medication on your normal schedule.       History of Present Illness     Pre-Op Examination     Surgery: RIght Hip Surgery   Anticipated Date of Surgery: 2025   Surgeon: Dr. Dio Candelario     Previous history of bleeding disorders or clots?: No    Previous Anesthesia reaction?: No    Prolonged steroid use in the last 6 months?: No      Assessment of Cardiac Risk:   - Unstable or severe angina or MI in the last 6 weeks or history of stent placement in the last year?: No    - Decompensated heart failure (e.g. New onset heart failure, NYHA  Class IV heart failure, or worsening existing heart failure)?: No    - Significant arrhythmias such as high grade AV block, symptomatic ventricular arrhythmia, newly recognized ventricular tachycardia, supraventricular tachycardia with resting  "heart rate >100, or symptomatic bradycardia?: No    - Severe heart valve disease including aortic stenosis or symptomatic mitral stenosis?: No      Pre-operative Risk Factors:  - Elevated-risk surgery: No    - History of cerebrovascular disease: No    - History of ischemic heart disease: No    - History of congestive heart failure: No    - Pre-operative treatment with insulin: No    - Pre-operative creatinine >2 mg/dL: No      Review of Systems   Constitutional:  Negative for activity change, appetite change, chills, fatigue and fever.   HENT:  Negative for congestion.    Respiratory:  Negative for cough, shortness of breath and wheezing.    Cardiovascular:  Negative for chest pain, palpitations and leg swelling.   Gastrointestinal:  Negative for abdominal pain, constipation, diarrhea, nausea and vomiting.   Musculoskeletal:  Positive for arthralgias and myalgias.   Skin:  Negative for rash.   Neurological:  Negative for light-headedness and headaches.   Psychiatric/Behavioral:  The patient is not nervous/anxious.      Past Medical History   Past Medical History[1]  Past Surgical History[2]  Family History[3]  Social History[4]  Medications[5]  Allergies   Allergen Reactions   • Molds & Smuts Itching     Objective   /88   Pulse 88   Temp (!) 97.2 °F (36.2 °C) (Temporal)   Resp 18   Ht 5' 10.75\" (1.797 m)   Wt 119 kg (262 lb)   SpO2 99%   BMI 36.80 kg/m²     Physical Exam  Constitutional:       Appearance: Normal appearance.   HENT:      Head: Normocephalic and atraumatic.     Cardiovascular:      Rate and Rhythm: Normal rate and regular rhythm.      Pulses: Normal pulses.      Heart sounds: Normal heart sounds.   Pulmonary:      Effort: Pulmonary effort is normal.      Breath sounds: Normal breath sounds.     Musculoskeletal:      Right hip: Tenderness present. Decreased range of motion.     Neurological:      General: No focal deficit present.      Mental Status: He is alert and oriented to person, " place, and time.     Psychiatric:         Mood and Affect: Mood normal.         Behavior: Behavior normal.         Thought Content: Thought content normal.         Judgment: Judgment normal.           Gris Huntley MD         [1]  Past Medical History:  Diagnosis Date   • Asthma    • Carpal tunnel syndrome     Right - Last Assessed: 2016    • Exercise-induced asthma     Last Assessed: 2014   [2]  Past Surgical History:  Procedure Laterality Date   • BACK SURGERY     • HIP SURGERY     [3]  Family History  Problem Relation Name Age of Onset   • Other Mother          Lymphedema    • Substance Abuse Family     • No Known Problems Father     • No Known Problems Brother     • Mental illness Neg Hx     [4]  Social History  Tobacco Use   • Smoking status: Former     Current packs/day: 0.00     Average packs/day: 1 pack/day for 4.0 years (4.0 ttl pk-yrs)     Types: Cigarettes     Start date:      Quit date: 2013     Years since quittin.4     Passive exposure: Past   • Smokeless tobacco: Former   • Tobacco comments:     vaping daily since stopped smoking 3 yrs ago   Vaping Use   • Vaping status: Former   • Start date: 6/15/2014   • Quit date: 2019   • Substances: Flavoring   Substance and Sexual Activity   • Alcohol use: No   • 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   • ibuprofen (MOTRIN) 800 mg tablet Take 800 mg by mouth every 6 (six) hours as needed   • naloxone (NARCAN) 4 mg/0.1 mL nasal spray 4 mg into each nostril   • Zubsolv 2.9-0.71 MG SUBL in the morning   • [DISCONTINUED]  celecoxib (CeleBREX) 200 mg capsule Take 1 capsule (200 mg total) by mouth 2 (two) times a day WITH FOOD (Patient not taking: Reported on 6/5/2025)   • [DISCONTINUED] gabapentin (NEURONTIN) 100 mg capsule Take 100 mg by mouth 3 (three) times a day (Patient not taking: Reported on 6/5/2025)

## 2025-06-24 ENCOUNTER — HOSPITAL ENCOUNTER (OUTPATIENT)
Dept: RADIOLOGY | Facility: HOSPITAL | Age: 35
Discharge: HOME | End: 2025-06-24
Payer: COMMERCIAL

## 2025-06-24 ENCOUNTER — HOSPITAL ENCOUNTER (OUTPATIENT)
Dept: RADIOLOGY | Facility: HOSPITAL | Age: 35
Discharge: HOME | End: 2025-06-24
Attending: NEUROLOGICAL SURGERY
Payer: COMMERCIAL

## 2025-06-24 DIAGNOSIS — M51.16 INTERVERTEBRAL DISC DISORDERS WITH RADICULOPATHY, LUMBAR REGION: ICD-10-CM

## 2025-06-24 PROCEDURE — 72131 CT LUMBAR SPINE W/O DYE: CPT

## 2025-06-24 PROCEDURE — 72110 X-RAY EXAM L-2 SPINE 4/>VWS: CPT
